# Patient Record
(demographics unavailable — no encounter records)

---

## 2019-03-13 NOTE — RAD REPORT
EXAM DESCRIPTION:  RAD - Chest Single View - 3/13/2019 6:15 pm

 

CLINICAL HISTORY:  Cough, abdominal pain, vomiting

 

COMPARISON:  None.

 

TECHNIQUE:  AP portable chest image was obtained 1726 hour .

 

FINDINGS:  No peripheral mass or consolidation. No significant failure or volume overload suspected. 
Stranding along the medial right lung base is suspected to be chronic atelectasis. Heart and vasculat
ure are normal. No measurable pleural effusion and no pneumothorax. Thoracolumbar scoliosis is presen
t with rods in place. No hardware fracture is seen. No acute aortic findings suspected.

 

IMPRESSION:  Lung parenchymal opacification medial right base is suspected be chronic atelectasis louie
ng the scoliotic curvature of the spine.

 

No failure or volume overload.

## 2019-03-13 NOTE — ER
Nurse's Notes                                                                                     

 [facility]                                                                

Name: Gita Shepherd                                                                                 

Age: 57 yrs                                                                                       

Sex: Female                                                                                       

: 1961                                                                                   

MRN: [id number]                                                                                   

Arrival Date: 2019                                                                          

Time: 16:16                                                                                       

Account#: [id number]                                                                            

Bed 23                                                                                            

Private MD: Arnulfo London                                                                          

Diagnosis: Cellulitis and acute lymphangitis of other parts of limb;Anemia,                       

  unspecified;Gastrointestinal hemorrhage, unspecified-guaiac trace positive;Hypokalemia          

                                                                                                  

Presentation:                                                                                     

                                                                                             

16:29 Presenting complaint: Patient states: I was sent here for evaluation from my PA's       sg  

      office, they think that I am very dehydrated, I have some sores that need attention         

      too, and sena also been vomiting for several days. Transition of care: patient was not       

      received from another setting of care. Onset of symptoms was 2019. Risk           

      Assessment: Do you want to hurt yourself or someone else? Patient reports no desire to      

      harm self or others. Initial Sepsis Screen: Does the patient meet any 2 criteria? No.       

      Patient's initial sepsis screen is negative. Does the patient have a suspected source       

      of infection? No. Patient's initial sepsis screen is negative. Care prior to arrival:       

      None.                                                                                       

16:29 Method Of Arrival: Ambulatory                                                             

16:29 Acuity: HANNAH 3                                                                           sg  

                                                                                                  

Historical:                                                                                       

- Allergies:                                                                                      

16:31 PENICILLINS;                                                                            sg  

- PMHx:                                                                                           

16:31 Hypertension;                                                                           sg  

                                                                                                  

- Immunization history:: Adult Immunizations up to date.                                          

- Social history:: Smoking status: Patient/guardian denies using tobacco.                         

- Ebola Screening: : Patient negative for fever greater than or equal to 101.5 degrees            

  Fahrenheit, and additional compatible Ebola Virus Disease symptoms Patient denies               

  exposure to infectious person Patient denies travel to an Ebola-affected area in the            

  21 days before illness onset No symptoms or risks identified at this time.                      

                                                                                                  

                                                                                                  

Screenin:30 Abuse screen: Denies threats or abuse. Denies injuries from another. Nutritional        ca1 

      screening: No deficits noted. Tuberculosis screening: No symptoms or risk factors           

      identified. Fall Risk None identified.                                                      

                                                                                                  

Assessment:                                                                                       

16:25 General: Appears in no apparent distress. comfortable, Behavior is calm, cooperative,   ca1 

      appropriate for age. Pain: Complains of pain in abdomen and right arm Pain currently is     

      8 out of 10 on a pain scale. Neuro: Level of Consciousness is awake, alert, obeys           

      commands, Oriented to person, place, time, situation, Appropriate for age.                  

      Cardiovascular: Heart tones S1 S2 present Capillary refill < 3 seconds Patient's skin       

      is warm and dry. Respiratory: Airway is patent Respiratory effort is even, unlabored,       

      Respiratory pattern is regular, symmetrical, Breath sounds are clear bilaterally. GI:       

      Abdomen is round non-distended, Bowel sounds present X 4 quads. Abd is soft X 4 quads       

      Abdomen is tender to palpation X 4 quads. Reports constipation, for 1 week. : No          

      deficits noted. No signs and/or symptoms were reported regarding the genitourinary          

      system. EENT: Oral mucosa is moist. Lesions noted. on inner R cheek. Derm: Skin is          

      healthy with good turgor, has lesions on Right arm that is ecchymotic and blistered in      

      the middle. Skin is pink, warm \T\ dry. Musculoskeletal: Circulation, motion, and           

      sensation intact. Capillary refill < 3 seconds.                                             

17:31 Reassessment: Patient appears in no apparent distress at this time. Patient and/or      ca1 

      family updated on plan of care and expected duration. Pain level reassessed. Patient is     

      alert, oriented x 3, equal unlabored respirations, skin warm/dry/pink.                      

18:11 Reassessment: Patient appears in no apparent distress at this time. Patient and/or      ca1 

      family updated on plan of care and expected duration. Pain level reassessed. Patient is     

      alert, oriented x 3, equal unlabored respirations, skin warm/dry/pink.                      

19:00 Reassessment: Patient appears in no apparent distress at this time. Patient and/or      ca1 

      family updated on plan of care and expected duration. Pain level reassessed. Patient is     

      alert, oriented x 3, equal unlabored respirations, skin warm/dry/pink. Awaiting room        

      assignment.                                                                                 

20:00 Reassessment: Patient appears in no apparent distress at this time. Patient and/or      ca1 

      family updated on plan of care and expected duration. Pain level reassessed. Patient is     

      alert, oriented x 3, equal unlabored respirations, skin warm/dry/pink.                      

20:30 Reassessment: called for report. 4th will call back when pt is assigned to a nurse.     ca1 

                                                                                                  

Vital Signs:                                                                                      

16:29  / 80; Pulse 82; Resp 16; Pulse Ox 99% on R/A; Pain 7/10;                         sg  

17:30 BP 90 / 61; Pulse 107; Resp 19; Pulse Ox 96% on R/A;                                    ca1 

18:11  / 62; Pulse 103; Resp 19; Pulse Ox 99% on R/A;                                   ca1 

18:47  / 69 Supine; Pulse 101;                                                          lt1 

18:47  / 61 Sitting; Pulse 108;                                                         lt1 

18:49  / 70 Standing; Pulse 116;                                                        lt1 

19:40  / 61; Pulse 94; Resp 19; Pulse Ox 100% on R/A;                                   ca1 

20:30  / 62; Pulse 96; Resp 19; Pulse Ox 100% on R/A;                                   ca1 

                                                                                                  

ED Course:                                                                                        

16:16 Patient arrived in ED.                                                                  as  

16:17 Arnulfo London MD is Private Physician.                                                  as  

16:24 Kishore Perdue MD is Attending Physician.                                             mono 

16:28 Delia Calderón, RN is Primary Nurse.                                                      ca1 

16:29 Arm band placed on.                                                                     sg  

16:30 Triage completed.                                                                       sg  

16:30 Patient has correct armband on for positive identification. Placed in gown. Bed in low  ca1 

      position. Call light in reach. Side rails up X 1. Cardiac monitor on. Pulse ox on. NIBP     

      on. Warm blanket given.                                                                     

16:50 Inserted saline lock: 20 gauge in left antecubital area, using aseptic technique. Blood ca1 

      collected.                                                                                  

17:04 First set of blood cultures drawn.                                                      ca1 

17:20 Second set of blood cultures drawn.                                                     ca1 

17:30 EKG done, by EKG tech. reviewed by Kishore Perdue MD.                                   sm3 

18:16 XRAY Chest (1 view) In Process Unspecified.                                             EDMS

18:21 Deysi Flores MD is Hospitalizing Provider.                                           mono 

20:31 IV discontinued, intact, bleeding controlled, No redness/swelling at site. Pressure     ca1 

      dressing applied.                                                                           

20:31 No provider procedures requiring assistance completed.                                  ca1 

                                                                                                  

Administered Medications:                                                                         

16:55 Drug: NS 0.9% 1000 ml Route: IV; Rate: 125 ml/hr; Site: left antecubital;               ca1 

19:06 Follow up: IV Status: Infusion continued upon admission                                 ca1 

20:33 Follow up: IV Status: Infusion continued upon admission                                 ca1 

17:55 Drug: Potassium Effervescent Tablet 25 mEq Route: PO;                                   ca1 

19:07 Follow up: Response: No adverse reaction                                                ca1 

17:55 Drug: fentaNYL (PF) 25 mcg Route: IVP; Site: left antecubital;                          ca1 

19:07 Follow up: Response: No adverse reaction; Pain is unchanged, physician notified         ca1 

18:00 Drug: Pepcid 20 mg Route: IVP; Site: left antecubital;                                  ca1 

19:07 Follow up: Response: No adverse reaction                                                ca1 

18:30 Drug: fentaNYL (PF) 25 mcg Route: IVP; Site: left antecubital;                          ca1 

19:42 Follow up: Response: No adverse reaction; Pain is decreased                             ca1 

18:35 Drug: NS 0.9% 500 ml Route: IV; Rate: bolus; Site: left antecubital;                    ca1 

19:06 Follow up: Response: No adverse reaction; IV Status: Infusion continued upon admission  ca1 

18:40 Drug: ProTONIX 40 mg Route: IVP; Site: left antecubital;                                ca1 

19:41 Follow up: Response: No adverse reaction                                                ca1 

18:42 Drug: Cefepime 1 grams Route: IVPB; Rate: 200 ml/hr; Infused Over: 30 mins; Site: left  ca1 

      antecubital;                                                                                

19:41 Follow up: Response: No adverse reaction; IV Status: Completed infusion                 ca1 

19:40 Drug: vancoMYCIN 1 grams Route: IVPB; Infused Over: 2 hrs; Site: left antecubital;      ca1 

20:33 Follow up: IV Status: Infusion continued upon admission                                 ca1 

                                                                                                  

                                                                                                  

Outcome:                                                                                          

18:22 Decision to Hospitalize by Provider.                                                    [facility] 

20:48 Admitted to Tele accompanied by tech, via wheelchair, room 420, with chart, Report      ca1 

      called to  Coty Avendano RN                                                                     

20:48 Condition: stable                                                                           

20:48 Instructed on the need for admit, Demonstrated understanding of instructions.               

21:13 Patient left the ED.                                                                    ca1 

                                                                                                  

Signatures:                                                                                       

Dispatcher [employer]                           EDMS                                                 

Ramesh Miller RN RN sg Anderson, Corey, MD MD cha Martinez, Amelia as Montes, Shakira                              sm3                                                  

Delia Calderón RN RN ca1 Tran, Leah                                   lt1                                                  

                                                                                                  

Corrections: (The following items were deleted from the chart)                                    

17:31 16:50 First set of blood cultures drawn ca1                                             ca1 

18:08 16:25 EENT: No deficits noted. No signs and/or symptoms were reported regarding the     ca1 

      EENT system. ca1                                                                            

18:50 18:47  / 70 Standing; Pulse 116bpm; lt1                                           lt1 

20:31 16:25 Derm: Skin is healthy with good turgor, has lesions on Right arm that is          ca1 

      ecchymotic and blistered in the middle. Skin is pink, warm \T\ dry. ca1                     

                                                                                                  

**************************************************************************************************

## 2019-03-13 NOTE — XMS REPORT
Clinical Summary

 Created on:2019



Patient:Gildardo Ryan

Sex:Female

:1961

External Reference #:[id number]





Demographics







 Address  [address] [place] [address]

 

 Home Phone  [phone]

 

 Mobile Phone  [phone]

 

 Home Phone  [phone]

 

 Home Phone  [phone]

 

 Email Address  [email or web address]

 

 Preferred Language  English

 

 Marital Status  

 

 Pentecostalism Affiliation  Unknown

 

 Race  White

 

 Ethnic Group  Not  or 









Author







 Organization  [facility]

 

 Address  [address] [place] [address]









Support







 Name  Relationship  Address  Phone

 

 Brett Ryan  Spouse  Unavailable  [phone]









Care Team Providers







 Name  Role  Phone

 

 Arnulfo London MD  Primary Care Provider  [phone]









Allergies







 Active Allergy  Reactions  Severity  Noted Date  Comments

 

 Penicillins  Rash  Low  2018  







Medications







 Medication  Sig  Dispensed  Refills  Start Date  End Date  Status

 

 metFORMIN  Take 1 mg by    0      Active



 (GLUCOPHAGE) 1,000  mouth 2 (two)          



 mg tablet  times a day.          

 

 simvastatin (ZOCOR)  Take 10 mg by    0      Active



 20 MG tablet  mouth nightly.          

 

 lurasidone (LATUDA)  Take 60 mg by    0      Active



 60 mg tablet  mouth daily.          

 

 desvenlafaxine  Take 100 mg by    0      Active



 (PRISTIQ) 100 MG 24  mouth daily.          



 hr tablet            

 

 topiramate (TOPAMAX)  Take 50 mg by    0      Active



 50 MG tablet  mouth 2 (two)          



   times a day.          

 

 metoprolol succinate  Take 100 mg by    0      Active



 XL (TOPROL-XL) 100  mouth daily.          



 mg 24 hr tablet            

 

 esomeprazole  Take 40 mg by    0      Active



 (NexIUM) 40 MG  mouth daily          



 capsule  before          



   breakfast.          

 

 ranitidine (ZANTAC)  Take 150 mg by    0      Active



 150 MG tablet  mouth nightly.          

 

 umeclidinium  Inhale 1 puff    0      Active



 brm/vilanterol tr  daily.          



 (ANORO ELLIPTA INHL)            

 

 polyethylene glycol  Take 17 g by    0      Active



 (MIRALAX) 17 gram  mouth daily.          



 packet            

 

 lisinopril  Take 1 tablet  30 tablet  0  2018    Active



 (PRINIVIL,ZESTRIL)  (20 mg total)          



 20 mg tablet  by mouth          



   nightly for 30          



   days.          

 

 amitriptyline  Take by mouth    0      Discontinued



 (ELAVIL) 75 MG  nightly.        8  



 tablet            

 

 levomefolate-algal  Take by mouth    0      Discontinued



 oil (DEPLIN, ALGAL  daily.        8  



 OIL,) 15-90.314 mg            



 capsule            

 

 furosemide (LASIX)  Take 20 mg by    0      Discontinued



 20 mg tablet  mouth daily.        8  

 

 lisinopril  Take 10 mg by    0      Discontinued



 (PRINIVIL,ZESTRIL)  mouth nightly.        8  



 10 mg tablet            

 

 sodium chloride 0.9%  by epidural    0      Discontinued



 parenteral solution  route        8  



 with morPHINE PF 1  continuously.          



 mg/mL solution,            



 bupivacaine (PF) 0.5            



 % (5 mg/mL) solution            



 0.0625 %, clonIDINE            



 (PF) 1,000 mcg/10 mL            



 (100 mcg/mL)            



 solution 1 mcg/mL            

 

 calcium carbonate  Chew 1 tablet  42 tablet  0  2018  



 (TUMS) 200 mg  (500 mg total)        8  



 calcium (500 mg)  3 (three)          



 chewable tablet  times a day          



   for 14 days.          

 

 docusate sodium  Take 1 capsule  60 capsule  0  2018  



 (COLACE) 100 MG  (100 mg total)        8  



 capsule  by mouth 2          



   (two) times a          



   day for 30          



   days.          

 

 gabapentin  Take 1 capsule  90 capsule  0  2018  



 (NEURONTIN) 100 mg  (100 mg total)        8  



 capsule  by mouth 3          



   (three) times          



   a day for 30          



   days.          

 

 pantoprazole  Take 1 tablet  60 tablet  0  2018  Discontinued



 (PROTONIX) 40 MG EC  (40 mg total)        8  



 tablet  by mouth 2          



   (two) times a          



   day for 30          



   days.          

 

 polyethylene glycol  Take 17 g by  30 packet  0  2018  
Discontinued



 (MIRALAX) 17 gram  mouth daily        8  



 packet  for 30 days.          

 

 senna (SENOKOT) 8.6  Take 2 tablets  60 tablet  0  2018  




 mg tablet  by mouth daily        8  



   with lunch for          



   30 days.          

 

 tiZANidine  Take 1 tablet  30 tablet  2  2018  



 (ZANAFLEX) 2 MG  (2 mg total)        8  



 tablet  by mouth every          



   6 (six) hours          



   as needed for          



   muscle spasms          



   for up to 30          



   days.          

 

 amLODIPine (NORVASC)  Take 1 tablet  30 tablet  0  2018  




 5 mg tablet  (5 mg total)        8  



   by mouth daily          



   for 30 days.          







Active Problems







 Problem  Noted Date

 

 Acute post-hemorrhagic anemia  2018

 

 S/P lumbar spinal fusion  2018

 

 Electrolyte and fluid disorder  2018

 

 Acute respiratory distress  2018

 

 Post-operative pain  2018

 

 Post-operative nausea and vomiting  2018

 

 Chronic pain  2018

 

 Obesity, Class III, BMI 40-49.9 (morbid obesity)  2018

 

 Acute blood loss anemia  2018

 

 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

 

 Scoliosis due to degenerative disease of spine in adult patient  2018







Encounters







 Date  Type  Specialty  Care Team  Description

 

 2019  Office Visit  Kassie Perez,  S/P lumbar spinal



       Perry BECERRIL MD  fusion (Primary Dx)

 

 2019  Hospital Encounter  Radiology  Ludy Perez MD  thoracolumbar spine,



         unspecified scoliosis



         type

 

 2019  Orders Only  Neurosurgery  Brush,  Lumbar radiculopathy, acute (
Primary Dx);



       Carmen, MA  Thoracic spine pain

 

 2019  Orders Only  Neurosurgery  Leufroy-Alea  Scoliosis of



       za, ,  thoracolumbar spine,



       MA  unspecified scoliosis



         type (Primary Dx)

 

 2018  Office Visit  JORDAN Wood MD  ENCOUNTER--DISREGARD



         (Primary Dx)

 

 2018  Orders Only  Neurosurgery  Leufroy-Alea  Scoliosis of



       za, ,  thoracolumbar spine,



       MA  unspecified scoliosis



         type (Primary Dx)

 

 2018  Office Visit  Kassie Perez,  Scoliosis due to



       Perry BECERRIL MD  degenerative disease



         of spine in adult



         patient (Primary Dx)

 

 2018  Hospital Encounter  Radiology  Ludy Perez MD  thoracolumbar spine,



         unspecified scoliosis



         type

 

 2018  Transcribe Orders  Neurosurgery  Leufroy-Alea  Scoliosis of



       za, ,  thoracolumbar spine,



       MA  unspecified scoliosis



         type (Primary Dx)

 

 2018 -  Hospital Encounter  Rehabilitation  Tastard,  Scoliosis due to



 2018      Devika LOVE MD  degenerative disease



         of spine in adult



         patient (Primary Dx)

 

 2018  Anesthesia Event  General Surgery  Kendal Ryan MD  

 

 2018  Surgery  General Surgery  Chris,  L2-L3, L4-L5 LATERAL



       Perry BECERRIL MD  INTERBODY FUSION W/



         INSTRUMENTATION

 

 07/10/2018  Anesthesia Event  General Surgery  Berry Barclay,  



       APRN  

 

 07/10/2018  Surgery  General Surgery  Chris,  L5-S1 ANTERIOR LUMBAR



       Perry BECERRIL MD  INTERBODY FUSION W/



         INSTRUMENTATION.

 

 07/10/2018 -  Hospital Encounter  Neurosurgery  Chris,  Other secondary 
scoliosis;



 2018      Perry BECERRIL MD  Scoliosis of lumbar spine, unspecified scoliosis 
type

 

 2018  Hospital Encounter  Radiology  Chris,  Preop testing



       Perry BECERRIL MD  

 

 2018  Pre-Admit Testing  Pre-Admission Testing  Chris  Preop testing (
Primary



   Appointment    Perry BECERRIL MD  Dx)

 

 2018  Pre-Admit Testing  Pre-Admission Testing  Chris  Preop testing (
Primary



   Appointment    Perry BECERRIL MD  Dx)

 

 2018  Hospital Encounter  Radiology  Chris  Age-related



       Perry BECERRIL MD  osteoporosis with



         current pathological



         fracture, initial



         encounter

 

 2018  Hospital Encounter  Radiology  Chris  Age-related



       Perry BECERRIL MD  osteoporosis with



         current pathological



         fracture, initial



         encounter

 

 2018  Office Visit  Neurosurgery  Chris,  Scoliosis due to



       Perry BECERRIL MD  degenerative disease



         of spine in adult



         patient (Primary Dx)

 

 2018  Transcribe Orders  Neurosurgery  ChitoAlea  Age-related



       za, ,  osteoporosis with



       MA  current pathological



         fracture, initial



         encounter (Primary Dx)



after 2018



Family History







 Medical History  Relation  Name  Comments

 

 Cancer  Paternal Grandfather    HIGH BLOOD PRESSURE/DIABETES/STROKE/THYROID



       TOOLITTLE









 Relation  Name  Status  Comments

 

 Paternal Grandfather    Other  







Social History







 Tobacco Use  Types  Packs/Day  Years Used  Date

 

 Former Smoker    2  25  Quit: 









 Smokeless Tobacco: Never Used      









 Comments: QUITE 5 YEARS









 Alcohol Use  Drinks/Week  oz/Week  Comments

 

 No      









 Sex Assigned at Birth  Date Recorded

 

 Not on file  









 Job Start Date  Occupation  Industry

 

 Not on file  Not on file  Not on file









 Travel History  Travel Start  Travel End









 No recent travel history available.







Last Filed Vital Signs







 Vital Sign  Reading  Time Taken

 

 Blood Pressure  118/65  2018 10:49 AM CDT

 

 Pulse  82  2018 10:49 AM CDT

 

 Temperature  37.1 C (98.7 F)  2018 10:49 AM CDT

 

 Respiratory Rate  18  2018 10:49 AM CDT

 

 Oxygen Saturation  95%  2018 10:49 AM CDT

 

 Inhaled Oxygen Concentration  -  -

 

 Weight  117 kg (259 lb)  07/10/2018  6:56 AM CDT

 

 Height  170.2 cm (5' 7")  07/10/2018  6:56 AM CDT

 

 Body Mass Index  40.57  07/10/2018  6:56 AM CDT







Plan of Treatment







 Date  Type  Specialty  Care Team  Description

 

 2019  Appointment  Radiology  Perry Perez MD



  



       [address]



  



       [address]



  



       [place], [place] [address] [phone] (Fax)  

 

 2019  Appointment  Radiology  Perry Perez MD



  



       [address]



  



       [address]



  



       [place], [place] [address] [phone] (Fax)  

 

 2019  Office Visit  Neurosurgery  Perry Perez MD



  



       [address]



  



       [address]



  



       [place], [place] [address] [phone] (Fax)  









 Health Maintenance  Due Date  Last Done  Comments

 

 CERVICAL CANCER SCREENING  1982    

 

 BREAST CANCER SCREENING  2011    

 

 COLON CANCER SCREENING  2011    

 

 SHINGLES VACCINES (#1)  2011    

 

 INFLUENZA VACCINE  Completed  2019  







Implants







 Implanted  Type  Area    Device  Shelf  Model /



         Identifier  Expiration  Serial / Lot



           Date  

 

 Bone Matriz Osteocel Pro Large - [id number] - [id number]  Human Tissue  N/A:  
NUVASIVE    2023  [id number] /



 Implanted: Qty: 1 on 07/10/2018 by Perry Perez MD  Implants  N/A        
[id number] /



             [id number]

 

 Paste Dbm Easy-Dispensing Wdmth Syr 10ml [address] - [id number] - [id number]
  Human Tissue  Posteri  MEDTRONIC    2020  [id number] /



 Implanted: Qty: 1 on 2018 by Perry Perez MD  Implants  or: N/A  
SPINAL GRAFT      [id number] /



       TECHNOLOGIES      [id number]

 

 Paste Dbm Easy-Dispensing Wdmth Syr 10ml [address] - [id number] - [id number]
  Human Tissue  Posteri  MEDTRONIC    2020  [id number] /



 Implanted: Qty: 1 on 2018 by Perry Perez MD  Implants  or: N/A  
SPINAL GRAFT      [id number] /



       TECHNOLOGIES      [id number]

 

 Bone Cancellous 30ml Chips - [id number] - [id number]  Human Tissue  Posteri  
RTI SURGICAL    2022  [id number] /



 Implanted: Qty: 1 on 2018 by Perry Perez MD  Implants  or: N/A  
INC.      [id number] /



             [id number]

 

 Bone Cancellous 30ml Chips - [id number] - [id number]  Human Tissue  Posteri  
RTI SURGICAL    2022  [id number] /



 Implanted: Qty: 1 on 2018 by Perry Perez MD  Implants  or: N/A  
INC.      [id number] /



             [id number]

 

 Bone Matriz Osteocel Pro Large - [id number] - [id number]  Human Tissue  N/A:  
NUVASIVE    2023  [id number] /



 Implanted: Qty: 1 on 2018 by Perry Perez MD  Implants  N/A        
[id number] /



             [id number]

 

 Kit Bone Grft Lmbr Tprd 8ml Xxl Infuse - [id number]  Human Tissue  Posteri  
MEDTRONIC    2019  [id number] /



 Implanted: Qty: 1 on 2018 by Perry Perez MD  Implants  or: N/A  
SPINAL AND       /



       BIOLOGICS      [id number]

 

 Kit Bone Grft Lmbr Tprd 8ml Xxl Infuse - [id number]  Human Tissue  Posteri  
MEDTRONIC    2019  [id number] /



 Implanted: Qty: 1 on 2018 by Perry Perez MD  Implants  or: N/A  
SPINAL AND       /



       BIOLOGICS      [id number]

 

 Maxcess 4 Surgical Access Kit - [id number]  IPM IMPLANT  N/A:  NUVASIVE SPINE 
     [id number] /



 Implanted: Qty: 1 on 07/10/2018 by Perry Perez MD  DEVICES  N/A         /

 

 Base Ti Imp, 8y36x83  10               - [id number]  IPM IMPLANT  N/A:  
NUVASIVE SPINE      [id number] /



 Implanted: Qty: 1 on 07/10/2018 by Perry Perez MD  DEVICES  N/A         /

 

 Base Ti Cantrall 6.0 X 17.5mm Variable - [id number]  IPM IMPLANT  N/A:  NUVASIVE 
SPINE      [id number] /



 Implanted: Qty: 1 on 07/10/2018 by Perry Perez MD  DEVICES  N/A         /

 

 Modulus Xlw, 03f10u35pm 15deg - [id number]  IPM IMPLANT  N/A:  NUVASIVE SPINE 
   2023  [id number] /



 Implanted: Qty: 1 on 2018 by Perry Perez MD  DEVICES  N/A         /



             [id number]

 

 Modulus Xlw, 47k72z54mc 15deg - [id number]  IPM IMPLANT  N/A:  NUVASIVE SPINE 
   2023  [id number] /



 Implanted: Qty: 1 on 2018 by Perry Perez MD  DEVICES  N/A         /



             [id number]

 

 Maxcess 4 Surgical Access Kit - [id number]  IPM IMPLANT  N/A:  NUVASIVE SPINE 
     [id number] /



 Implanted: 2018 (Quantity not on file)  DEVICES  N/A         /

 

 Unid Patient Specific Marty 5.5 - [id number]  IPM IMPLANT  N/A:  MEDICREA INT'L 
   10/13/2018  [id number] /



 Implanted: 2018 (Quantity not on file)  DEVICES  N/A         /



             [id number]

 

 Screw [id number] Bs Cnmas 7.5x80 T/C - [id number]  IPM IMPLANT  Posteri  
[employer]ON[employer]      [id number] /



 Implanted: Qty: 1 on 2018 by Perry Perez MD  DEVICES  or: N/A  
SOFAMOR DANEK       /

 

 Screw [id number] 5.5 Mas 7.5x100 Cc - [id number]  IPM IMPLANT  Posteri  
[employer]TRONIC      [id number] /



 Implanted: Qty: 1 on 2018 by Perry Perez MD  DEVICES  or: N/A  
SOFAMOR DANEK       /

 

 Screw [id number] 5.5 Mas 5.5x35 Cc - [id number]  IPM IMPLANT  Posteri  
[employer]TRONIC      [id number] /



 Implanted: Qty: 4 on 2018 by Perry Perez MD  DEVICES  or: N/A  
SOFAMOR DANEK       /

 

 Screw [id number] 5.5 Mas 5.5x40 Cc - [id number]  IPM IMPLANT  Posteri  
[employer]TRONIC      [id number] /



 Implanted: Qty: 3 on 2018 by Perry Perez MD  DEVICES  or: N/A  
SOFAMOR DANEK       /

 

 Screw [id number] 5.5 Mas 5.5x45 Cc - [id number]  IPM IMPLANT  Posteri  
[employer]TR[employer]IC      [id number] /



 Implanted: Qty: 12 on 2018 by Perry Perez MD  DEVICES  or: N/A  
SOFAMOR DANEK       /

 

 Screw [id number] 5.5 Mas 5.5x50 Cc - [id number]  IPM IMPLANT  Posteri  
[employer]TRONIC      [id number] /



 Implanted: Qty: 2 on 2018 by Perry Perez MD  DEVICES  or: N/A  
SOFAMOR DANEK       /

 

 Screw [id number] 5.5 Mas 6.5x50 Cc - [id number]  IPM IMPLANT  Posteri  
MEDTRONIC      [id number] /



 Implanted: Qty: 1 on 2018 by Perry Perez MD  DEVICES  or: N/A  
SOFAMOR DANEK       /

 

 Screw [id number] 5.5 Mas 6.5x45 Cc - [id number]  IPM IMPLANT  Posteri  
MEDTRONIC      [id number] /



 Implanted: Qty: 3 on 2018 by Perry Perez MD  DEVICES  or: N/A  
SOFAMOR DANEK       /

 

 Screw [id number] 5.5 Mas 7.5x40 Cc - [id number]  IPM IMPLANT  Posteri  
MEDTRONIC      [id number] /



 Implanted: Qty: 1 on 2018 by Perry Perez MD  DEVICES  or: N/A  
SOFAMOR DANEK       /

 

 Screw [id number] 5.5 Mas 7.5x35 Cc - [id number]  IPM IMPLANT  Posteri  
[employer]TRONIC      [id number] /



 Implanted: Qty: 1 on 2018 by Perry Perez MD  DEVICES  or: N/A  
SOFAMOR DANEK       /

 

 Screw [id number] 5.5 Mas 8.5x50 Cc - [id number]  IPM IMPLANT  Posteri  
[employer]TRONIC      [id number] /



 Implanted: Qty: 1 on 2018 by Perry Perez MD  DEVICES  or: N/A  
SOFAMOR DANEK       /

 

 Screw [id number] 5.5 Mas 8.5x40 Cc - [id number]  IPM IMPLANT  Posteri  
[employer]TRONIC      [id number] /



 Implanted: Qty: 1 on 2018 by Perry Perez MD  DEVICES  or: N/A  
SOFAMOR DANEK       /

 

 Screw [id number] 5.5 Mas 6.5x50 Cc - [id number]  IPM IMPLANT  Posteri  
[employer]TRONIC      [id number] /



 Implanted: Qty: 1 on 2018 by Perry Perez MD  DEVICES  or: N/A  
SOFAMOR DANEK       /

 

 Connector [id number] 5/6-6.0 Clsd Lat 20 - [id number]  IPM IMPLANT  Posteri  
[employer]TRONIC      [id number] /



 Implanted: Qty: 1 on 2018 by Perry Perez MD  DEVICES  or: N/A  
SOFAMOR DANEK       /

 

 Connector [id number] 5/6-6.0 Clsd Lat 25 - [id number]  IPM IMPLANT  Posteri  
MEDTRONIC      [id number] /



 Implanted: Qty: 1 on 2018 by Perry Perez MD  DEVICES  or: N/A  
SOFAMOR DANEK       /

 

 Material Bone Hmsts Wtrsolbl 2.5g Ostene - [id number]  Orthopedic  N/A:      10
/  [id number] /



 Implanted: Qty: 1 on 07/10/2018 by Perry Perez MD  Trauma  N/A         /



   Implants          [id number]

 

 Material Bone Hmsts Wtrsolbl 2.5g Ostene - [id number]  Orthopedic  N/A:      10
/  [id number] /



 Implanted: Qty: 1 on 07/10/2018 by Perry Perez MD  Trauma  N/A         /



   Implants          [id number]

 

 Material Bone Hmsts Wtrsolbl 2.5g Ostene - [id number]  Orthopedic  N/A:      10
/  [id number] /



 Implanted: Qty: 1 on 2018 by Perry Perez MD  Trauma  N/A         /



   Implants          [id number]

 

 Material Bone Hmsts Wtrsolbl 2.5g Ostene - [id number]  Orthopedic  N/A:      10
/  [id number] /



 Implanted: Qty: 1 on 2018 by Perry Perez MD  Trauma  N/A         /



   Implants          [id number]

 

 Screw Bone Canc Lag 4x30mm Ns - [id number]  Orthopedic  Posteri  [employer]TR[employer]    
  [id number] /



 Implanted: Qty: 32 on 2018 by Perry Perez MD  Trauma  or: N/A  
SOFAMOR DANEK       /



   Implants          

 

 Kit Dil M5 Xlif - [id number]  Spinal  N/A:  NUVASIVE      [id number] /



 Implanted: Qty: 1 on 07/10/2018 by Perry Perez MD  Implants  N/A         /

 

 Kit Dil M5 Xlif - [id number]  Spinal  N/A:  NUVASIVE      [id number] /



 Implanted: 2018 (Quantity not on file)  Implants  N/A         /

 

 Screw Set Std Ti 1/4in 32mm - [id number]  Spinal  Posteri  [employer]      
[id number] /



 Implanted: Qty: 2 on 2018 by Perry Perez MD  Implants  or: N/A  
SPINAL AND       /



       BIOLOGICS      







Procedures







 Procedure Name  Priority  Date/Time  Associated Diagnosis  Comments

 

 XR SPINE SCOLIOSIS 2-3  Routine  2019  2:09  Scoliosis of  Results for 
this



 VIEWS    PM CST  thoracolumbar spine,  procedure are in



       unspecified scoliosis  the results



       type  section.

 

 TRANSFUSE RED BLOOD  Routine  2018  5:55    



 CELLS    PM CDT    

 

 XR SPINE SCOLIOSIS 2-3  Routine  2018 12:14  Scoliosis of  Results for 
this



 VIEWS    PM CDT  thoracolumbar spine,  procedure are in



       unspecified scoliosis  the results



       type  section.

 

 POC GLUCOSE  Routine  2018  7:13    Results for this



     AM CDT    procedure are in



         the results



         section.

 

 POC GLUCOSE  Routine  2018  7:40    Results for this



     AM CDT    procedure are in



         the results



         section.

 

 POC GLUCOSE  Routine  2018  6:42    Results for this



     AM CDT    procedure are in



         the results



         section.

 

 POC GLUCOSE  Routine  2018  6:33    Results for this



     AM CDT    procedure are in



         the results



         section.

 

 POC GLUCOSE  Routine  2018  8:34    Results for this



     PM CDT    procedure are in



         the results



         section.

 

 POC GLUCOSE  Routine  2018  6:22    Results for this



     AM CDT    procedure are in



         the results



         section.

 

 ZZESTIMATED GFR  Routine  2018  4:00    Results for this



     AM CDT    procedure are in



         the results



         section.

 

 BASIC METABOLIC PANEL  Routine  2018  4:00    Results for this



     AM CDT    procedure are in



         the results



         section.

 

 POC GLUCOSE  Routine  2018  6:46    Results for this



     AM CDT    procedure are in



         the results



         section.

 

 POC GLUCOSE  Routine  2018  5:50    Results for this



     PM CDT    procedure are in



         the results



         section.

 

 POC GLUCOSE  Routine  2018  6:28    Results for this



     AM CDT    procedure are in



         the results



         section.

 

 SMEAR REVIEW  Routine  2018  5:00    Results for this



     AM CDT    procedure are in



         the results



         section.

 

 ZZESTIMATED GFR  Routine  2018  5:00    Results for this



     AM CDT    procedure are in



         the results



         section.

 

 THYROID STIMULATING  Routine  2018  5:00    Results for this



 HORMONE    AM CDT    procedure are in



         the results



         section.

 

 HC COMPLETE BLD COUNT  Routine  2018  5:00    Results for this



 W/AUTO DIFF    AM CDT    procedure are in



         the results



         section.

 

 BASIC METABOLIC PANEL  Routine  2018  5:00    Results for this



     AM CDT    procedure are in



         the results



         section.

 

 URINALYSIS SCREEN AND  Routine  2018  9:16    Results for this



 MICROSCOPY, WITH    PM CDT    procedure are in



 REFLEX TO CULTURE        the results



         section.

 

 URINE CULTURE  Routine  2018  9:16    Results for this



     PM CDT    procedure are in



         the results



         section.

 

 POC GLUCOSE  Routine  2018  9:10    Results for this



     PM CDT    procedure are in



         the results



         section.

 

 NM LUNG VENTILATION  STAT  2018  8:29    Results for this



 PERFUSION    PM CDT    procedure are in



         the results



         section.

 

 XR CHEST 1 VW PORTABLE  STAT  2018  5:11    Results for this



     PM CDT    procedure are in



         the results



         section.

 

 POC GLUCOSE  Routine  2018  4:59    Results for this



     PM CDT    procedure are in



         the results



         section.

 

 ZZESTIMATED GFR  Routine  2018 12:55    Results for this



     PM CDT    procedure are in



         the results



         section.

 

 COMPREHENSIVE  Routine  2018 12:55    Results for this



 METABOLIC PANEL    PM CDT    procedure are in



         the results



         section.

 

 HC COMPLETE BLD COUNT  Routine  2018 12:55    Results for this



 W/AUTO DIFF    PM CDT    procedure are in



         the results



         section.

 

 POC GLUCOSE  Routine  2018 11:07    Results for this



     AM CDT    procedure are in



         the results



         section.

 

 POC GLUCOSE  Routine  2018  6:43    Results for this



     AM CDT    procedure are in



         the results



         section.

 

 POC GLUCOSE  Routine  2018  9:08    Results for this



     PM CDT    procedure are in



         the results



         section.

 

 POC GLUCOSE  Routine  2018  5:00    Results for this



     PM CDT    procedure are in



         the results



         section.

 

 POC GLUCOSE  Routine  2018 11:16    Results for this



     AM CDT    procedure are in



         the results



         section.

 

 POC GLUCOSE  Routine  2018  6:36    Results for this



     AM CDT    procedure are in



         the results



         section.

 

 POC GLUCOSE  Routine  2018  8:43    Results for this



     PM CDT    procedure are in



         the results



         section.

 

 POC GLUCOSE  Routine  2018  4:55    Results for this



     PM CDT    procedure are in



         the results



         section.

 

 POC GLUCOSE  Routine  2018 11:07    Results for this



     AM CDT    procedure are in



         the results



         section.

 

 HEMOGLOBIN A1C  Routine  2018  7:02    Results for this



     AM CDT    procedure are in



         the results



         section.

 

 HC COMPLETE BLD COUNT  Routine  2018  7:02    Results for this



 W/AUTO DIFF    AM CDT    procedure are in



         the results



         section.

 

 POC GLUCOSE  Routine  2018  6:23    Results for this



     AM CDT    procedure are in



         the results



         section.

 

 POC GLUCOSE  Routine  2018  8:59    Results for this



     PM CDT    procedure are in



         the results



         section.

 

 URINALYSIS, AUTOMATED  Routine  2018  8:30    Results for this



 WITH MICROSCOPY    PM CDT    procedure are in



         the results



         section.

 

 ZZESTIMATED GFR  Routine  2018  7:45    Results for this



     PM CDT    procedure are in



         the results



         section.

 

 PREALBUMIN LEVEL  Routine  2018  7:45    Results for this



     PM CDT    procedure are in



         the results



         section.

 

 PHOSPHORUS LEVEL  Routine  2018  7:45    Results for this



     PM CDT    procedure are in



         the results



         section.

 

 MAGNESIUM LEVEL  Routine  2018  7:45    Results for this



     PM CDT    procedure are in



         the results



         section.

 

 COMPREHENSIVE  Routine  2018  7:45    Results for this



 METABOLIC PANEL    PM CDT    procedure are in



         the results



         section.

 

 POC GLUCOSE  Routine  2018  4:45    Results for this



     PM CDT    procedure are in



         the results



         section.

 

 POC GLUCOSE  Routine  2018 11:30    Results for this



     AM CDT    procedure are in



         the results



         section.

 

 POTASSIUM LEVEL  Routine  2018  8:22    Results for this



     AM CDT    procedure are in



         the results



         section.

 

 POC GLUCOSE  Routine  2018  7:24    Results for this



     AM CDT    procedure are in



         the results



         section.

 

 POC GLUCOSE  Routine  2018  9:43    Results for this



     PM CDT    procedure are in



         the results



         section.

 

 POC GLUCOSE  Routine  2018  5:21    Results for this



     PM CDT    procedure are in



         the results



         section.

 

 POC GLUCOSE  Routine  2018 11:29    Results for this



     AM CDT    procedure are in



         the results



         section.

 

 ZZESTIMATED GFR  Routine  2018  3:35    Results for this



     AM CDT    procedure are in



         the results



         section.

 

 HC COMPLETE BLD COUNT  Routine  2018  3:35    Results for this



 W/AUTO DIFF    AM CDT    procedure are in



         the results



         section.

 

 MAGNESIUM LEVEL  Routine  2018  3:35    Results for this



     AM CDT    procedure are in



         the results



         section.

 

 BASIC METABOLIC PANEL  Routine  2018  3:35    Results for this



     AM CDT    procedure are in



         the results



         section.

 

 POC GLUCOSE  Routine  2018  8:10    Results for this



     PM CDT    procedure are in



         the results



         section.

 

 POC GLUCOSE  Routine  2018  3:55    Results for this



     PM CDT    procedure are in



         the results



         section.

 

 POC GLUCOSE  Routine  2018 11:24    Results for this



     AM CDT    procedure are in



         the results



         section.

 

 POC GLUCOSE  Routine  2018  7:54    Results for this



     AM CDT    procedure are in



         the results



         section.

 

 HC COMPLETE BLD COUNT  Routine  2018  6:35    Results for this



 W/AUTO DIFF    AM CDT    procedure are in



         the results



         section.

 

 ZZESTIMATED GFR  Routine  2018  4:00    Results for this



     AM CDT    procedure are in



         the results



         section.

 

 BASIC METABOLIC PANEL  Routine  2018  4:00    Results for this



     AM CDT    procedure are in



         the results



         section.

 

 POC GLUCOSE  Routine  2018 10:20    Results for this



     PM CDT    procedure are in



         the results



         section.

 

 POC GLUCOSE  Routine  2018  4:34    Results for this



     PM CDT    procedure are in



         the results



         section.

 

 POC GLUCOSE  Routine  2018 12:06    Results for this



     PM CDT    procedure are in



         the results



         section.

 

 POC GLUCOSE  Routine  2018  8:13    Results for this



     AM CDT    procedure are in



         the results



         section.

 

 ZZESTIMATED GFR  Routine  2018  4:00    Results for this



     AM CDT    procedure are in



         the results



         section.

 

 BASIC METABOLIC PANEL  Routine  2018  4:00    Results for this



     AM CDT    procedure are in



         the results



         section.

 

 POC GLUCOSE  Routine  2018 10:01    Results for this



     PM CDT    procedure are in



         the results



         section.

 

 POC GLUCOSE  Routine  2018  4:36    Results for this



     PM CDT    procedure are in



         the results



         section.

 

 ZZESTIMATED GFR  Timed  2018  2:35    Results for this



     PM CDT    procedure are in



         the results



         section.

 

 BASIC METABOLIC PANEL  Timed  2018  2:35    Results for this



     PM CDT    procedure are in



         the results



         section.

 

 POC GLUCOSE  Routine  2018 12:05    Results for this



     PM CDT    procedure are in



         the results



         section.

 

 POC GLUCOSE  Routine  2018  7:59    Results for this



     AM CDT    procedure are in



         the results



         section.

 

 CBC HEMOGRAM  Routine  2018  3:25    Results for this



     AM CDT    procedure are in



         the results



         section.

 

 ZZESTIMATED GFR  Routine  2018  3:25    Results for this



     AM CDT    procedure are in



         the results



         section.

 

 BASIC METABOLIC PANEL  Routine  2018  3:25    Results for this



     AM CDT    procedure are in



         the results



         section.

 

 POC GLUCOSE  Routine  2018  9:05    Results for this



     PM CDT    procedure are in



         the results



         section.

 

 POC GLUCOSE  Routine  2018  4:08    Results for this



     PM CDT    procedure are in



         the results



         section.

 

 POC GLUCOSE  Routine  2018 11:40    Results for this



     AM CDT    procedure are in



         the results



         section.

 

 VANCOMYCIN LEVEL,  Timed  2018  9:26    Results for this



 TROUGH    AM CDT    procedure are in



         the results



         section.

 

 POC GLUCOSE  Routine  2018  8:20    Results for this



     AM CDT    procedure are in



         the results



         section.

 

 POC GLUCOSE  Routine  2018  4:38    Results for this



     AM CDT    procedure are in



         the results



         section.

 

 ZZESTIMATED GFR  Routine  2018  4:00    Results for this



     AM CDT    procedure are in



         the results



         section.

 

 BASIC METABOLIC PANEL  Routine  2018  4:00    Results for this



     AM CDT    procedure are in



         the results



         section.

 

 POC GLUCOSE  Routine  2018 11:50    Results for this



     PM CDT    procedure are in



         the results



         section.

 

 POC GLUCOSE  Routine  2018  9:09    Results for this



     PM CDT    procedure are in



         the results



         section.

 

 POC GLUCOSE  Routine  2018  5:04    Results for this



     PM CDT    procedure are in



         the results



         section.

 

 ECG 12-LEAD  STAT  2018  3:34    Results for this



     PM CDT    procedure are in



         the results



         section.

 

 TROPONIN  STAT  2018  3:04    Results for this



     PM CDT    procedure are in



         the results



         section.

 

 ZZESTIMATED GFR  Routine  2018  3:04    Results for this



     PM CDT    procedure are in



         the results



         section.

 

 BASIC METABOLIC PANEL  Routine  2018  3:04    Results for this



     PM CDT    procedure are in



         the results



         section.

 

 HC COMPLETE BLD COUNT  Routine  2018  3:04    Results for this



 W/AUTO DIFF    PM CDT    procedure are in



         the results



         section.

 

 POC GLUCOSE  Routine  2018 11:55    Results for this



     AM CDT    procedure are in



         the results



         section.

 

 POC GLUCOSE  Routine  2018  8:23    Results for this



     AM CDT    procedure are in



         the results



         section.

 

 POC GLUCOSE  Routine  2018  5:43    Results for this



     AM CDT    procedure are in



         the results



         section.

 

 POC GLUCOSE  Routine  2018 10:45    Results for this



     PM CDT    procedure are in



         the results



         section.

 

 POC GLUCOSE  Routine  2018  8:15    Results for this



     PM CDT    procedure are in



         the results



         section.

 

 VANCOMYCIN LEVEL,  Routine  2018  5:07    Results for this



 RANDOM    PM CDT    procedure are in



         the results



         section.

 

 POC GLUCOSE  Routine  2018  5:02    Results for this



     PM CDT    procedure are in



         the results



         section.

 

 POC GLUCOSE  Routine  2018 11:51    Results for this



     AM CDT    procedure are in



         the results



         section.

 

 POTASSIUM LEVEL  Routine  2018 11:26    Results for this



     AM CDT    procedure are in



         the results



         section.

 

 XR CHEST 1 VW PORTABLE  Timed  2018 10:14    Results for this



     AM CDT    procedure are in



         the results



         section.

 

 POC GLUCOSE  Routine  2018  7:58    Results for this



     AM CDT    procedure are in



         the results



         section.

 

 ZZESTIMATED GFR  Routine  2018  5:12    Results for this



     AM CDT    procedure are in



         the results



         section.

 

 PHOSPHORUS LEVEL  Routine  2018  5:12    Results for this



     AM CDT    procedure are in



         the results



         section.

 

 MAGNESIUM LEVEL  Routine  2018  5:12    Results for this



     AM CDT    procedure are in



         the results



         section.

 

 IONIZED CALCIUM  Routine  2018  5:12    Results for this



     AM CDT    procedure are in



         the results



         section.

 

 CBC HEMOGRAM  Routine  2018  5:12    Results for this



     AM CDT    procedure are in



         the results



         section.

 

 BASIC METABOLIC PANEL  Routine  2018  5:12    Results for this



     AM CDT    procedure are in



         the results



         section.

 

 POC GLUCOSE  Routine  2018 12:34    Results for this



     AM CDT    procedure are in



         the results



         section.

 

 POC GLUCOSE  Routine  2018  8:53    Results for this



     PM CDT    procedure are in



         the results



         section.

 

 POC GLUCOSE  Routine  2018  4:00    Results for this



     PM CDT    procedure are in



         the results



         section.

 

 POC GLUCOSE  Routine  2018 11:26    Results for this



     AM CDT    procedure are in



         the results



         section.

 

 XR PICC CHEST PORTABLE  Routine  2018 11:18  Scoliosis of lumbar  
Results for this



     AM CDT  spine, unspecified  procedure are in



       scoliosis type  the results



         section.

 

 HC CATH DUAL LUMEN  Routine  2018 10:34    Results for this



 PICC    AM CDT    procedure are in



         the results



         section.

 

 HC US GUIDED VASCULAR  Routine  2018 10:34    Results for this



 ACCESS    AM CDT    procedure are in



         the results



         section.

 

 HC CVL PICC INSERT 5  Routine  2018 10:34    Results for this



 YRS OR > W/O IMG GUID    AM CDT    procedure are in



         the results



         section.

 

 INTRAOPERATIVE  Routine  2018  7:24    



 MONITORING    AM CDT    

 

 POC GLUCOSE  Routine  2018  7:11    Results for this



     AM CDT    procedure are in



         the results



         section.

 

 PREPARE RBC  Timed  2018  6:40    



     AM CDT    

 

 PREPARE RBC  Timed  2018  6:40    Results for this



     AM CDT    procedure are in



         the results



         section.

 

 TYPE AND SCREEN  Timed  2018  6:40    Results for this



     AM CDT    procedure are in



         the results



         section.

 

 ZZESTIMATED GFR  Routine  2018  4:02    Results for this



     AM CDT    procedure are in



         the results



         section.

 

 MAGNESIUM LEVEL  Routine  2018  4:02    Results for this



     AM CDT    procedure are in



         the results



         section.

 

 PHOSPHORUS LEVEL  Routine  2018  4:02    Results for this



     AM CDT    procedure are in



         the results



         section.

 

 COMPREHENSIVE  Routine  2018  4:02    Results for this



 METABOLIC PANEL    AM CDT    procedure are in



         the results



         section.

 

 HC COMPLETE BLD COUNT  Routine  2018  3:50    Results for this



 W/AUTO DIFF    AM CDT    procedure are in



         the results



         section.

 

 POC GLUCOSE  Routine  2018  3:37    Results for this



     AM CDT    procedure are in



         the results



         section.

 

 POC GLUCOSE  Routine  07/15/2018 11:44    Results for this



     PM CDT    procedure are in



         the results



         section.

 

 ZZESTIMATED GFR  Timed  07/15/2018  8:00    Results for this



     PM CDT    procedure are in



         the results



         section.

 

 BASIC METABOLIC PANEL  Timed  07/15/2018  8:00    Results for this



     PM CDT    procedure are in



         the results



         section.

 

 POC GLUCOSE  Routine  07/15/2018  7:32    Results for this



     PM CDT    procedure are in



         the results



         section.

 

 US HEPATIC  Routine  07/15/2018  4:00    Results for this



     PM CDT    procedure are in



         the results



         section.

 

 POC GLUCOSE  Routine  07/15/2018  3:25    Results for this



     PM CDT    procedure are in



         the results



         section.

 

 ECG 12-LEAD  Routine  07/15/2018  2:40    Results for this



     PM CDT    procedure are in



         the results



         section.

 

 XR ABDOMEN 1 VW  STAT  07/15/2018 12:38    Results for this



 PORTABLE    PM CDT    procedure are in



         the results



         section.

 

 POC GLUCOSE  Routine  07/15/2018 11:30    Results for this



     AM CDT    procedure are in



         the results



         section.

 

 ZZESTIMATED GFR  Routine  07/15/2018 10:20    Results for this



     AM CDT    procedure are in



         the results



         section.

 

 ARTERIAL BLOOD GAS  Routine  07/15/2018 10:20    Results for this



     AM CDT    procedure are in



         the results



         section.

 

 BASIC METABOLIC PANEL  Routine  07/15/2018 10:20    Results for this



     AM CDT    procedure are in



         the results



         section.

 

 US DUPLEX VENOUS LOWER  STAT  07/15/2018  8:28    Results for this



 EXTREMITY BILATERAL    AM CDT    procedure are in



         the results



         section.

 

 POC GLUCOSE  Routine  07/15/2018  7:00    Results for this



     AM CDT    procedure are in



         the results



         section.

 

 ARTERIAL BLOOD GAS  Routine  07/15/2018  6:37    Results for this



     AM CDT    procedure are in



         the results



         section.

 

 XR CHEST 1 VW PORTABLE  STAT  07/15/2018  5:08    Results for this



     AM CDT    procedure are in



         the results



         section.

 

 ARTERIAL BLOOD GAS  STAT  07/15/2018  5:00    Results for this



     AM CDT    procedure are in



         the results



         section.

 

 CT ANGIOGRAM PE CHEST  STAT  07/15/2018  4:52    Results for this



     AM CDT    procedure are in



         the results



         section.

 

 POC GLUCOSE  Routine  07/15/2018  3:52    Results for this



     AM CDT    procedure are in



         the results



         section.

 

 POTASSIUM LEVEL  STAT  07/15/2018  3:35    Results for this



     AM CDT    procedure are in



         the results



         section.

 

 PHOSPHORUS LEVEL  STAT  07/15/2018  3:35    Results for this



     AM CDT    procedure are in



         the results



         section.

 

 BLOOD CULTURE, AEROBIC  Routine  07/15/2018  2:00    Results for this



 & ANAEROBIC    AM CDT    procedure are in



         the results



         section.

 

 BLOOD CULTURE, AEROBIC  Routine  07/15/2018  1:00    Results for this



 & ANAEROBIC    AM CDT    procedure are in



         the results



         section.

 

 MANUAL DIFFERENTIAL  Routine  07/15/2018 12:10    Results for this



     AM CDT    procedure are in



         the results



         section.

 

 CBC WITH PLATELET AND  Routine  07/15/2018 12:10    Results for this



 DIFFERENTIAL    AM CDT    procedure are in



         the results



         section.

 

 ZZESTIMATED GFR  Routine  07/15/2018 12:03    Results for this



     AM CDT    procedure are in



         the results



         section.

 

 IONIZED CALCIUM  Routine  07/15/2018 12:03    Results for this



     AM CDT    procedure are in



         the results



         section.

 

 PHOSPHORUS LEVEL  Routine  07/15/2018 12:03    Results for this



     AM CDT    procedure are in



         the results



         section.

 

 MAGNESIUM LEVEL  Routine  07/15/2018 12:03    Results for this



     AM CDT    procedure are in



         the results



         section.

 

 COMPREHENSIVE  Routine  07/15/2018 12:03    Results for this



 METABOLIC PANEL    AM CDT    procedure are in



         the results



         section.

 

 ARTERIAL BLOOD GAS  STAT  07/15/2018 12:00    Results for this



     AM CDT    procedure are in



         the results



         section.

 

 POC GLUCOSE  Routine  2018 11:59    Results for this



     PM CDT    procedure are in



         the results



         section.

 

 SMEAR REVIEW  STAT  2018  9:45    Results for this



     PM CDT    procedure are in



         the results



         section.

 

 HC COMPLETE BLD COUNT  STAT  2018  9:45    Results for this



 W/AUTO DIFF    PM CDT    procedure are in



         the results



         section.

 

 TRANSFUSE RED BLOOD  Routine  2018  9:32    



 CELLS    PM CDT    

 

 POC GLUCOSE  Routine  2018  8:06    Results for this



     PM CDT    procedure are in



         the results



         section.

 

 TRANSFUSE RED BLOOD  Routine  2018  6:45    



 CELLS    PM CDT    

 

 HEMOGLOBIN &  Routine  2018  4:00    Results for this



 HEMATOCRIT    PM CDT    procedure are in



         the results



         section.

 

 POC GLUCOSE  Routine  2018  3:39    Results for this



     PM CDT    procedure are in



         the results



         section.

 

 POC GLUCOSE  Routine  2018 11:23    Results for this



     AM CDT    procedure are in



         the results



         section.

 

 XR CHEST 1 VW PORTABLE  Routine  2018  9:03    Results for this



     AM CDT    procedure are in



         the results



         section.

 

 POC GLUCOSE  Routine  2018  8:18    Results for this



     AM CDT    procedure are in



         the results



         section.

 

 POC GLUCOSE  Routine  2018  5:21    Results for this



     AM CDT    procedure are in



         the results



         section.

 

 SMEAR REVIEW  Routine  2018  3:15    Results for this



     AM CDT    procedure are in



         the results



         section.

 

 HC COMPLETE BLD COUNT  Routine  2018  3:15    Results for this



 W/AUTO DIFF    AM CDT    procedure are in



         the results



         section.

 

 POC GLUCOSE  Routine  2018 12:00    Results for this



     AM CDT    procedure are in



         the results



         section.

 

 ZZESTIMATED GFR  Routine  2018 12:00    Results for this



     AM CDT    procedure are in



         the results



         section.

 

 BASIC METABOLIC PANEL  Routine  2018 12:00    Results for this



     AM CDT    procedure are in



         the results



         section.

 

 ECG 12-LEAD  STAT  2018 11:15    Results for this



     PM CDT    procedure are in



         the results



         section.

 

 SMEAR REVIEW  STAT  2018 11:00    Results for this



     PM CDT    procedure are in



         the results



         section.

 

 HC COMPLETE BLD COUNT  STAT  2018 11:00    Results for this



 W/AUTO DIFF    PM CDT    procedure are in



         the results



         section.

 

 ARTERIAL BLOOD GAS  STAT  2018 10:35    Results for this



     PM CDT    procedure are in



         the results



         section.

 

 ZZESTIMATED GFR  STAT  2018 10:30    Results for this



     PM CDT    procedure are in



         the results



         section.

 

 PHOSPHORUS LEVEL  STAT  2018 10:30    Results for this



     PM CDT    procedure are in



         the results



         section.

 

 MAGNESIUM LEVEL  STAT  2018 10:30    Results for this



     PM CDT    procedure are in



         the results



         section.

 

 BASIC METABOLIC PANEL  STAT  2018 10:30    Results for this



     PM CDT    procedure are in



         the results



         section.

 

 POC GLUCOSE  Routine  2018 10:09    Results for this



     PM CDT    procedure are in



         the results



         section.

 

 CT HEAD WO CONTRAST  STAT  2018  9:08    Results for this



     PM CDT    procedure are in



         the results



         section.

 

 ARTERIAL BLOOD GAS  Routine  2018  7:59    Results for this



     PM CDT    procedure are in



         the results



         section.

 

 SMEAR REVIEW  Routine  2018  6:45    Results for this



     PM CDT    procedure are in



         the results



         section.

 

 ZZESTIMATED GFR  Routine  2018  6:45    Results for this



     PM CDT    procedure are in



         the results



         section.

 

 BASIC METABOLIC PANEL  Routine  2018  6:45    Results for this



     PM CDT    procedure are in



         the results



         section.

 

 HC COMPLETE BLD COUNT  Routine  2018  6:45    Results for this



 W/AUTO DIFF    PM CDT    procedure are in



         the results



         section.

 

 MAGNESIUM LEVEL  STAT  2018  5:05    Results for this



     PM CDT    procedure are in



         the results



         section.

 

 LACTIC ACID, SYRINGE  STAT  2018  5:05    Results for this



     PM CDT    procedure are in



         the results



         section.

 

 IONIZED CALCIUM,  STAT  2018  5:05    Results for this



 ARTERIAL    PM CDT    procedure are in



         the results



         section.

 

 GLUCOSE LEVEL, SYRINGE  STAT  2018  5:05    Results for this



     PM CDT    procedure are in



         the results



         section.

 

 POTASSIUM, SYRINGE  STAT  2018  5:05    Results for this



     PM CDT    procedure are in



         the results



         section.

 

 HEMOGLOBIN, SYRINGE  STAT  2018  5:05    Results for this



     PM CDT    procedure are in



         the results



         section.

 

 SODIUM LEVEL, SYRINGE  STAT  2018  5:05    Results for this



     PM CDT    procedure are in



         the results



         section.

 

 ARTERIAL BLOOD GAS,  STAT  2018  5:05    Results for this



 CORRECTED    PM CDT    procedure are in



         the results



         section.

 

 OR FL > 1 HOUR  Routine  2018  4:41    Results for this



     PM CDT    procedure are in



         the results



         section.

 

 TRANSFUSE RED BLOOD  Routine  2018  4:34    



 CELLS    PM CDT    

 

 XR LUMBAR SPINE 1 VW  Routine  2018  4:31    Results for this



     PM CDT    procedure are in



         the results



         section.

 

 XR CERVICAL SPINE 1 VW  Routine  2018  4:31    Results for this



     PM CDT    procedure are in



         the results



         section.

 

 TRANSFUSE RED BLOOD  Routine  2018  4:10    



 CELLS    PM CDT    

 

 LACTIC ACID, SYRINGE  STAT  2018  3:50    Results for this



     PM CDT    procedure are in



         the results



         section.

 

 MAGNESIUM LEVEL  STAT  2018  3:50    Results for this



     PM CDT    procedure are in



         the results



         section.

 

 GLUCOSE LEVEL, SYRINGE  STAT  2018  3:50    Results for this



     PM CDT    procedure are in



         the results



         section.

 

 HEMOGLOBIN, SYRINGE  STAT  2018  3:50    Results for this



     PM CDT    procedure are in



         the results



         section.

 

 IONIZED CALCIUM,  STAT  2018  3:50    Results for this



 ARTERIAL    PM CDT    procedure are in



         the results



         section.

 

 SODIUM LEVEL, SYRINGE  STAT  2018  3:50    Results for this



     PM CDT    procedure are in



         the results



         section.

 

 POTASSIUM, SYRINGE  STAT  2018  3:50    Results for this



     PM CDT    procedure are in



         the results



         section.

 

 ARTERIAL BLOOD GAS,  STAT  2018  3:50    Results for this



 CORRECTED    PM CDT    procedure are in



         the results



         section.

 

 GLUCOSE LEVEL, SYRINGE  STAT  2018  2:11    Results for this



     PM CDT    procedure are in



         the results



         section.

 

 IONIZED CALCIUM,  STAT  2018  2:11    Results for this



 ARTERIAL    PM CDT    procedure are in



         the results



         section.

 

 HEMOGLOBIN, SYRINGE  STAT  2018  2:11    Results for this



     PM CDT    procedure are in



         the results



         section.

 

 POTASSIUM, SYRINGE  STAT  2018  2:11    Results for this



     PM CDT    procedure are in



         the results



         section.

 

 SODIUM LEVEL, SYRINGE  STAT  2018  2:11    Results for this



     PM CDT    procedure are in



         the results



         section.

 

 ARTERIAL BLOOD GAS,  STAT  2018  2:11    Results for this



 CORRECTED    PM CDT    procedure are in



         the results



         section.

 

 OR FL > 1 HOUR  Routine  2018 10:21    Results for this



     AM CDT    procedure are in



         the results



         section.

 

 ARTERIAL LINE  Routine  2018  9:17    



     AM CDT    









   Procedure Note - Kendal Ryan MD - 2018  9:17 AM CDT



Arterial line



   Performed by: KENDAL RYAN



   Authorized by: KENDAL RYAN



   



   Patient Location:  OR



   Start Time:  2018 7:52 AM



   End Time:  2018 7:54 AM



   Staff:



     Anesthesiologist:  KENDAL RYAN



     Performed by:  Anesthesiologist



   Pre-procedure: patient identified, IV checked, site and side verified, risks 
and benefits discussed, procedure verified, surgical consent complete, patient 
position confirmed, monitors and equipment checked and pre-op evaluation 
complete



   MSBT: antiseptic used, all elements of maximal sterile barrier technique 
followed, hand hygiene performed, cap/gown used by other personnel and 
solutions labeled



   TIme Out Performed:  2018 7:52 AM



   Indications:



     Indications: multiple ABGs and hemodynamic monitoring



   Anesthesia:



     Anesthesia:  General



   Procedure Details:



     Arterial Line placement:  Placed post induction



     Line placement site:  Radial



   Line placement side:  Right



     Arterial line gauge:  20 G



   Number of attempts:  1



   Ultrasound guidance used: No



   Post-procedure:



     Post-procedure:  Sterile dressing applied



     Post procedure circulation, sensation, movement:  Unchanged



     Patient tolerance:  Patient tolerated the procedure well with no immediate 
complications









 ZZESTIMATED GFR  STAT  2018  9:05 AM CDT    Results for this



         procedure are in the



         results section.

 

 BASIC METABOLIC PANEL  STAT  2018  9:05 AM CDT    Results for this



         procedure are in the



         results section.

 

 HC COMPLETE BLD COUNT  STAT  2018  9:05 AM CDT    Results for this



 W/AUTO DIFF        procedure are in the



         results section.

 

 NC AN ELECTIVE ENDOTRACHEAL  Routine  2018  8:10 AM CDT    



 AIRWAY        









   Procedure Note - Sulema Cazares, CRNA - 2018  8:10 AM CDT



Airway



   Date/Time: 2018 7:51 AM



   Performed by: SULEMA CAZARES



   Authorized by: KENDAL RYAN



   



   Location:  OR



   Urgency:  Elective



   Difficult Airway: No



   Preoxygenated with 100% O2: Yes



   C-spine Precautions Maintained Throughout: Yes



   Mask Ventilation:  Easy mask



   Final Airway Type:  Endotracheal airway



   Final Endotracheal Airway:  ETT



   Cuffed: Yes



   Technique Used:  Direct laryngoscopy



   Insertion Site:  Oral



   Blade Type:  Val



   Laryngoscope Blade/Videolaryngoscope Blade Size:  3



   ETT Size (mm):  7.0



   Cuff at minimum occlusion pressure: Yes



   Measured from:  Teeth



   ETT to Teeth (cm):  21



   Placement Verified by: CO2 detection, direct visualization and equal breath 
sounds



   Laryngoscopic view:  Grade I - full view of glottis



   Rapid Sequence Induction (RSI): No



   Modified RSI: No



   Number of Attempts at Approach:  1



    Atraumatic insertion. Dentition remained intact.









 SURGICAL PATHOLOGY  Routine  2018  7:41 AM    Results for this



 REQUEST    CDT    procedure are in



         the results



         section.

 

 FUSION, SPINE,    2018  7:30 AM  Other secondary  



 LUMBAR, POSTERIOR    CDT  scoliosis  



 APPROACH        









   Case Notes







   PT TBA ON 7/10, DR MERINO ASSISTING,  [place] PRO AXIS TABLE, STEALTH S7, 
OARM,



   AQUAMANTYS, BONE SCAPEL, MEDTRONIC INSTRUMENATION, EMG, MEPS, SSEPS

 

   Special Needs







   PT TBA ON 7/10, DR MERINO CO-SURGEON,  [place] PRO AXIS TABLE, STEALTH S7, 
OARM,



   AQUAMANTYS, BONE SCAPEL, MEDTRONIC INSTRUMENATION, EMG, MEPS, SSEPS









 FUSION, SPINE, LUMBAR, XLIF    2018  7:30 AM CDT  Other secondary 
scoliosis  









   Case Notes







   PT TBA ON 7/10, DR MERINO ASSISTING,  [place] PRO AXIS TABLE, STEALTH S7, 
OARM,



   AQUAMANTYS, BONE SCAPEL, MEDTRONIC INSTRUMENATION, EMG, MEPS, SSEPS

 

   Special Needs







   PT TBA ON 7/10, DR MERINO CO-SURGEON,  [place] PRO AXIS TABLE, STEALTH S7, 
OARM,



   AQUAMANTYS, BONE SCAPEL, MEDTRONIC INSTRUMENATION, EMG, MEPS, SSEPS









 POC GLUCOSE  Routine  2018  5:47 AM CDT    Results for this



         procedure are in the



         results section.

 

 PARTIAL THROMBOPLASTIN TIME  Routine  2018  4:28 AM CDT    Results for 
this



 (PTT)        procedure are in the



         results section.

 

 PROTHROMBIN TIME WITH INR  Routine  2018  4:28 AM CDT    Results for this



         procedure are in the



         results section.

 

 HC COMPLETE BLD COUNT W/AUTO  Routine  2018  4:28 AM CDT    Results for 
this



 DIFF        procedure are in the



         results section.

 

 POC GLUCOSE  Routine  2018  5:30 PM CDT    Results for this



         procedure are in the



         results section.

 

 PREPARE RBC  Timed  2018  3:00 PM CDT    Results for this



         procedure are in the



         results section.

 

 PREPARE RBC  Routine  2018  3:00 PM CDT    Results for this



         procedure are in the



         results section.

 

 TYPE AND SCREEN  Routine  2018  3:00 PM CDT    Results for this



         procedure are in the



         results section.

 

 XR ABDOMEN 1 VW PORTABLE  Routine  2018 12:51 PM CDT    Results for this



         procedure are in the



         results section.

 

 POC GLUCOSE  Routine  2018 11:39 AM CDT    Results for this



         procedure are in the



         results section.

 

 POC GLUCOSE  Routine  2018  7:43 AM CDT    Results for this



         procedure are in the



         results section.

 

 HEMOGLOBIN  Routine  2018  4:00 AM CDT    Results for this



         procedure are in the



         results section.

 

 POC GLUCOSE  Routine  2018  9:57 PM CDT    Results for this



         procedure are in the



         results section.

 

 XR SPINE SCOLIOSIS 2-3 VIEWS  Routine  2018  8:49 PM CDT    Results for 
this



         procedure are in the



         results section.

 

 POC GLUCOSE  Routine  2018  5:02 PM CDT    Results for this



         procedure are in the



         results section.

 

 POC GLUCOSE  Routine  2018 11:25 AM CDT    Results for this



         procedure are in the



         results section.

 

 POC GLUCOSE  Routine  2018  8:22 AM CDT    Results for this



         procedure are in the



         results section.

 

 ZZESTIMATED GFR  Routine  2018  3:20 AM CDT    Results for this



         procedure are in the



         results section.

 

 PROTHROMBIN TIME WITH INR  Routine  2018  3:20 AM CDT    Results for this



         procedure are in the



         results section.

 

 PARTIAL THROMBOPLASTIN TIME  Routine  2018  3:20 AM CDT    Results for 
this



 (PTT)        procedure are in the



         results section.

 

 HC COMPLETE BLD COUNT W/AUTO  Routine  2018  3:20 AM CDT    Results for 
this



 DIFF        procedure are in the



         results section.

 

 BASIC METABOLIC PANEL  Routine  2018  3:20 AM CDT    Results for this



         procedure are in the



         results section.

 

 POC GLUCOSE  Routine  07/10/2018  6:07 PM CDT    Results for this



         procedure are in the



         results section.

 

 CT THORACIC SPINE WO  Routine  07/10/2018  5:00 PM CDT    Results for this



 CONTRAST        procedure are in the



         results section.

 

 CT LUMBAR SPINE WO CONTRAST  Routine  07/10/2018  4:59 PM CDT    Results for 
this



         procedure are in the



         results section.

 

 LACTIC ACID LEVEL  Routine  07/10/2018  3:00 PM CDT    Results for this



         procedure are in the



         results section.

 

 LACTIC ACID, SYRINGE  STAT  07/10/2018  1:34 PM CDT    Results for this



         procedure are in the



         results section.

 

 ARTERIAL BLOOD GAS  STAT  07/10/2018  1:34 PM CDT    Results for this



         procedure are in the



         results section.

 

 SURGICAL PATHOLOGY REQUEST  Routine  07/10/2018  1:15 PM CDT    Results for 
this



         procedure are in the



         results section.

 

 POC GLUCOSE  Routine  07/10/2018  1:11 PM CDT    Results for this



         procedure are in the



         results section.

 

 LACTIC ACID, SYRINGE  STAT  07/10/2018 12:19 PM CDT    Results for this



         procedure are in the



         results section.

 

 IONIZED CALCIUM, ARTERIAL  STAT  07/10/2018 12:19 PM CDT    Results for this



         procedure are in the



         results section.

 

 GLUCOSE LEVEL, SYRINGE  STAT  07/10/2018 12:19 PM CDT    Results for this



         procedure are in the



         results section.

 

 POTASSIUM, SYRINGE  STAT  07/10/2018 12:19 PM CDT    Results for this



         procedure are in the



         results section.

 

 SODIUM LEVEL, SYRINGE  STAT  07/10/2018 12:19 PM CDT    Results for this



         procedure are in the



         results section.

 

 HEMOGLOBIN, SYRINGE  STAT  07/10/2018 12:19 PM CDT    Results for this



         procedure are in the



         results section.

 

 ARTERIAL BLOOD GAS,  STAT  07/10/2018 12:19 PM CDT    Results for this



 CORRECTED        procedure are in the



         results section.

 

 MAGNESIUM LEVEL  STAT  07/10/2018 12:05 PM CDT    Results for this



         procedure are in the



         results section.

 

 OR FL > 1 HOUR  Routine  07/10/2018 12:00 PM CDT    Results for this



         procedure are in the



         results section.

 

 LACTIC ACID, SYRINGE  STAT  07/10/2018 10:55 AM CDT    Results for this



         procedure are in the



         results section.

 

 IONIZED CALCIUM, ARTERIAL  STAT  07/10/2018 10:55 AM CDT    Results for this



         procedure are in the



         results section.

 

 MAGNESIUM LEVEL  STAT  07/10/2018 10:55 AM CDT    Results for this



         procedure are in the



         results section.

 

 GLUCOSE LEVEL, SYRINGE  STAT  07/10/2018 10:55 AM CDT    Results for this



         procedure are in the



         results section.

 

 HEMOGLOBIN, SYRINGE  STAT  07/10/2018 10:55 AM CDT    Results for this



         procedure are in the



         results section.

 

 POTASSIUM, SYRINGE  STAT  07/10/2018 10:55 AM CDT    Results for this



         procedure are in the



         results section.

 

 SODIUM LEVEL, SYRINGE  STAT  07/10/2018 10:55 AM CDT    Results for this



         procedure are in the



         results section.

 

 ARTERIAL BLOOD GAS,  STAT  07/10/2018 10:55 AM CDT    Results for this



 CORRECTED        procedure are in the



         results section.

 

 ARTERIAL LINE  Routine  07/10/2018  9:30 AM CDT    









   Procedure Note - Keith Reece MD - 07/10/2018  9:30 AM CDT



Arterial line



   Performed by: JUANITA GASPAR



   Authorized by: KEITH REECE



   



   Patient Location:  OR



   Start Time:  7/10/2018 7:46 AM



   End Time:  7/10/2018 7:49 AM



   Staff:



     Anesthesiologist:  KEITH REECE



     Resident/CRNA/AA:  JUANITA GASPAR



     Performed by:  Anesthesiologist



   Pre-procedure: patient identified, IV checked, site and side verified, risks 
and benefits discussed, procedure verified, surgical consent complete, patient 
position confirmed, monitors and equipment checked and pre-op evaluation 
complete



   MSBT: antiseptic used, all elements of maximal sterile barrier technique 
followed, hand hygiene performed, cap/gown used by other personnel and 
solutions labeled



   TIme Out Performed:  7/10/2018 7:45 AM



   Indications:



     Indications: multiple ABGs and hemodynamic monitoring



   Anesthesia:



     Anesthesia:  General



   Procedure Details:



     Arterial Line placement:  Placed post induction



     Line placement site:  Radial



   Line placement side:  Right



     Arterial line gauge:  20 G



   Number of attempts:  1



   Ultrasound guidance used: No



   Post-procedure:



     Post-procedure:  Sterile dressing applied



     Post procedure circulation, sensation, movement:  Normal and unchanged



     Patient tolerance:  Patient tolerated the procedure well with no immediate 
complications



   Notes:



      A-line placed by Dr. Reece









 NC AN ELECTIVE ENDOTRACHEAL AIRWAY  Routine  07/10/2018  8:53 AM CDT    









   Procedure Note - Juanita Gaspar CRNA - 07/10/2018  8:53 AM CDT



Airway



   Date/Time: 7/10/2018 7:47 AM



   Performed by: JUANITA GASPAR



   Authorized by: KETIH REECE



   



   Location:  OR



   Urgency:  Elective



   Difficult Airway: No



   Anesthesiologist:  KEITH REECE



   Resident/CRNA/AA:  JUANITA GASPAR



   Performed by:



      /CRNA/AA



   Preoxygenated with 100% O2: Yes



   C-spine Precautions Maintained Throughout: No



   Mask Ventilation:  Easy mask



   Final Airway Type:  Endotracheal airway



   Final Endotracheal Airway:  ETT



   Cuffed: Yes



   Technique Used:  Video laryngoscopy



   Devices/Methods Used in Placement:  Intubating stylet



   Insertion Site:  Oral



   Laryngoscope Blade/Videolaryngoscope Blade Size:  3



   ETT Size (mm):  7.0



   Cuff at minimum occlusion pressure: Yes



   Measured from:  Teeth



   ETT to Teeth (cm):  22



   Placement Verified by: CO2 detection, direct visualization and equal breath 
sounds



   Laryngoscopic view:  Grade I - full view of glottis



   Rapid Sequence Induction (RSI): No



   Modified RSI: No



   Number of Attempts at Approach:  2



    First attempt with MAC 3 blade via DL unsuccessful - no view.  Second 
attempt with Glidescope 3, easy intubation, grade 1 view, teeth/lips unchanged 
from preop condition









 LACTIC ACID, SYRINGE  STAT  07/10/2018  8:30 AM    Results for this



     CDT    procedure are in



         the results



         section.

 

 MAGNESIUM LEVEL  STAT  07/10/2018  8:30 AM    Results for this



     CDT    procedure are in



         the results



         section.

 

 GLUCOSE LEVEL,  STAT  07/10/2018  8:30 AM    Results for this



 SYRINGE    CDT    procedure are in



         the results



         section.

 

 HEMOGLOBIN, SYRINGE  STAT  07/10/2018  8:30 AM    Results for this



     CDT    procedure are in



         the results



         section.

 

 IONIZED CALCIUM,  STAT  07/10/2018  8:30 AM    Results for this



 ARTERIAL    CDT    procedure are in



         the results



         section.

 

 POTASSIUM, SYRINGE  STAT  07/10/2018  8:30 AM    Results for this



     CDT    procedure are in



         the results



         section.

 

 SODIUM LEVEL,  STAT  07/10/2018  8:30 AM    Results for this



 SYRINGE    CDT    procedure are in



         the results



         section.

 

 ARTERIAL BLOOD GAS,  STAT  07/10/2018  8:30 AM    Results for this



 CORRECTED    CDT    procedure are in



         the results



         section.

 

 FUSION, SPINE,    07/10/2018  7:30 AM  Other secondary  



 LUMBAR, XLIF    CDT  scoliosis  









   Case Notes







   DR JEANIE MOHR TO DO EXPOSURE, C-ARM. AMSCO BED, NUVASIVE INSTRUMENATION, EMG, 
SSEP,



   MEPS

 

   Special Needs







   DR JEANIE MOHR TO DO EXPOSURE, C-ARM. AMSCO BED, NUVASIVE INSTRUMENATION, EMG, 
SSEP,



   MEPS









 POC GLUCOSE  Routine  07/10/2018  6:54    Results for this



     AM CDT    procedure are in



         the results



         section.

 

 XR CHEST 2 VW  Routine  2018  5:24  Preop testing  Results for this



     PM CDT    procedure are in



         the results



         section.

 

 URINE CULTURE  Routine  2018  4:50    Results for this



     PM CDT    procedure are in



         the results



         section.

 

 ECG 12-LEAD  Routine  2018  4:44  Preop testing  Results for this



     PM CDT    procedure are in



         the results



         section.

 

 ZZESTIMATED GFR  Routine  2018  4:21    Results for this



     PM CDT    procedure are in



         the results



         section.

 

 BASIC METABOLIC PANEL  Routine  2018  4:21  Preop testing  Results for 
this



     PM CDT    procedure are in



         the results



         section.

 

 CBC HEMOGRAM  Routine  2018  4:21  Preop testing  Results for this



     PM CDT    procedure are in



         the results



         section.

 

 URINALYSIS SCREEN AND  Routine  2018  4:21  Preop testing  Results for 
this



 MICROSCOPY, WITH    PM CDT    procedure are in



 REFLEX TO CULTURE        the results



         section.

 

 HEPATIC FUNCTION PANEL  Routine  2018  4:21  Preop testing  Results for 
this



     PM CDT    procedure are in



         the results



         section.

 

 PARTIAL THROMBOPLASTIN  Routine  2018  4:21  Preop testing  Results for 
this



 TIME (PTT)    PM CDT    procedure are in



         the results



         section.

 

 PROTHROMBIN TIME WITH  Routine  2018  4:21  Preop testing  Results for 
this



 INR    PM CDT    procedure are in



         the results



         section.

 

 TYPE AND SCREEN  Routine  2018  4:21  Preop testing  Results for this



     PM CDT    procedure are in



         the results



         section.

 

 VITAMIN D 25 HYDROXY  Routine  2018  4:21  Preop testing  Results for 
this



 LEVEL    PM CDT    procedure are in



         the results



         section.

 

 PARATHYROID HORMONE  Routine  2018  4:21  Preop testing  Results for this



     PM CDT    procedure are in



         the results



         section.

 

 PHOSPHORUS LEVEL  Routine  2018  4:21  Preop testing  Results for this



     PM CDT    procedure are in



         the results



         section.

 

 MAGNESIUM LEVEL  Routine  2018  4:21  Preop testing  Results for this



     PM CDT    procedure are in



         the results



         section.

 

 HEMOGLOBIN A1C  Routine  2018  4:21  Preop testing  Results for this



     PM CDT    procedure are in



         the results



         section.

 

 BONE DENSITY  Routine  2018  5:25  Age-related  Results for this



 PERIPHERAL    PM CDT  osteoporosis with  procedure are in



       current pathological  the results



       fracture, initial  section.



       encounter  

 

 BONE DENSITY  Routine  2018  5:25  Age-related  Results for this



     PM CDT  osteoporosis with  procedure are in



       current pathological  the results



       fracture, initial  section.



       encounter  



after 2018



Results

XR Spine Scoliosos 2-3 Views (2019  2:09 PM CST)Only the most recent of3 
resultswithin the time period is included.





 Narrative  Performed At

 

 EXAMINATION:XR SPINE SCOLIOSIS 2-3 VIEWS



  [facility]



  



  



 CLINICAL HISTORY:M41.9 Scoliosisunspecified, SCOLIOSIS



  



  



  



  



  



  



  



 COMPARISON: 2018.



  



  



  



 IMPRESSION:



  



  



  



 9 views of the spine are submitted per scoliosis protocol.



  



  



  



 Again noted is posterior fusion extending from T4 through the sacroiliac  



 joints. Interbody fusions are again noted at L2-3, L4-5, and L5-S1. ACDF  



 is again seen at C5-6.



  



  



  



 There is interval fracture of the left sacroiliac screw.



  



  



  



 Thoracic dextroscoliosis is similar to the comparison study, measuring  



 35 degrees. Cervicothoracic and lumbar levocurvature is again seen as  



 well.



  



  



  



 The plumbline measures 3.7 cm left of the mid sacral line. 



  



  



  



 The plumbline measures 5.8 cm ventral to the posterior superior corner  



 of S1.



  



  



  



 A pump overlies the right abdomen. An intrathecal catheter terminates at  



 the T11 level.



  



  



  



 There are bilateral hip arthroplasties.



  



  



  



 [id number]



  



   









 Procedure Note

 

  Interface, Radiology Results Incoming - 2019  2:47 PM CST



EXAMINATION:  XR SPINE SCOLIOSIS 2-3 VIEWS



 



 CLINICAL HISTORY:  M41.9 Scoliosis  unspecified, SCOLIOSIS



 



 



 



 COMPARISON: 2018.



 



 IMPRESSION:



 



 9 views of the spine are submitted per scoliosis protocol.



 



 Again noted is posterior fusion extending from T4 through the sacroiliac 
joints. Interbody fusions are again noted at L2-3, L4-5, and L5-S1. ACDF is 
again seen at C5-6.



 



 There is interval fracture of the left sacroiliac screw.



 



 Thoracic dextroscoliosis is similar to the comparison study, measuring 35 
degrees. Cervicothoracic and lumbar levocurvature is again seen as well.



 



 The plumbline measures 3.7 cm left of the mid sacral line.



 



 The plumbline measures 5.8 cm ventral to the posterior superior corner of S1.



 



 A pump overlies the right abdomen. An intrathecal catheter terminates at the 
T11 level.



 



 There are bilateral hip arthroplasties.



 



 [id number]









 Performing Organization  Address  City/State/Zipcode  Phone Number

 

  [facility]  [address] [place] [address]  



Transfuse RBC (2018  5:55 PM CDT)Only the most recent of4 resultswithin 
the time period is included.POC glucose (2018  7:13 AM CDT)Only the most 
recent of87 resultswithin the time period is included.





 Component  Value  Ref Range  Performed At

 

 POC glucose  106 (H)  65 - 99 mg/dL  [facility] DEPARTMENT OF PATHOLOGY



   Comment:    AND GENOMIC MEDICINE



   [facility] Notified RN    



   Meter ID: [id number]    



   : Edwin KESSLER    



       









 Performing Organization  Address  City/State[facility]  Phone Number

 

 [facility] AND  [address] [place] [phone]  



 [facility] MEDICINE      



Estimated GFR (2018  4:00 AM CDT)Only the most recent of22 resultswithin 
the time period is included.





 Component  Value  Ref Range  Performed At

 

 GFR Non Af Amer  74  mL/min/1.73 m2  [facility] DEPARTMENT OF



       PATHOLOGY AND GENOMIC



       MEDICINE

 

 GFR Af Amer  90  mL/min/1.73 m2  [facility] DEPARTMENT OF



   Comment:    PATHOLOGY AND [facility]



   Chronic kidney disease: <60 mL/min/1.73m2    MEDICINE



   Kidney failure: <15 mL/min/1.73m2    



   The estimated GFR is calculated from the IDMS-traceable Modification of Diet
    



   in Renal Disease Equation. The accuracy of the calculation is poor when the 
   



   creatinine is normal. Calculated values >90 mL/min/1.73m2 are not reported. 
   



   This equation has not been validated in children (<18 years), pregnant    



   women, the elderly (>70 years), or ethnic groups other than Caucasians and  
  



    Americans.    



       









 Specimen

 

 Plasma specimen









 Performing Organization  Address  City/[facility]  Phone Number

 

 [facility] AND  [address] [place] [phone]  



 [facility]      



Basic metabolic panel (2018  4:00 AM CDT)Only the most recent of18 
resultswithin the time period is included.





 Component  Value  Ref Range  Performed At

 

 Sodium  139  135 - 148 mEq/L  [facility] DEPARTMENT OF PATHOLOGY AND GENOMIC MEDICINE

 

 Potassium  3.9  3.5 - 5.0 mEq/L  [facility] DEPARTMENT OF PATHOLOGY AND GENOMIC 
MEDICINE

 

 Chloride  100  98 - 112 mEq/L  [facility] DEPARTMENT OF PATHOLOGY AND GENOMIC MEDICINE

 

 CO2  27  24 - 31 mEq/L  [facility] DEPARTMENT OF PATHOLOGY AND GENOMIC MEDICINE

 

 Anion gap  12@ANIO  7 - 15 mEq/L  [facility] DEPARTMENT OF PATHOLOGY AND GENOMIC 
MEDICINE

 

 BUN  9  6 - 20 mg/dL  [facility] DEPARTMENT OF PATHOLOGY AND GENOMIC MEDICINE

 

 Creatinine  0.8  0.5 - 0.9 mg/dL  [facility] DEPARTMENT OF PATHOLOGY AND GENOMIC 
MEDICINE

 

 Glucose  102 (H)  65 - 99 mg/dL  [facility] DEPARTMENT OF PATHOLOGY AND GENOMIC 
MEDICINE

 

 Calcium  9.1  8.3 - 10.2 mg/dL  [facility] DEPARTMENT OF PATHOLOGY AND GENOMIC 
MEDICINE









 Specimen

 

 Plasma specimen









 Performing Organization  Address  City/State/Zipcode  Phone Number

 

 [facility] DEPARTMENT OF PATHOLOGY AND  [address] [place] [address]  



 GENOMIC MEDICINE      



Smear review (2018  5:00 AM CDT)Only the most recent of5 resultswithin 
the time period is included.





 Component  Value  Ref Range  Performed At

 

 Platelet slide review  Solitario adequate    [facility] DEPARTMENT OF PATHOLOGY AND GENOMIC



       MEDICINE

 

 Anisocytosis  Moderate    [facility] DEPARTMENT OF PATHOLOGY AND GENOMIC



       MEDICINE

 

 Polychromasia  Moderate    [facility] DEPARTMENT OF PATHOLOGY AND GENOMIC



       MEDICINE

 

 Ovalocytes  Moderate    [facility] DEPARTMENT OF PATHOLOGY AND GENOMIC



       MEDICINE

 

 Enlarged platelets  Moderate (A)    [facility] DEPARTMENT OF PATHOLOGY AND GENOMIC



       MEDICINE









 Performing Organization  Address  City/State/Zipcode  Phone Number

 

 [facility] DEPARTMENT OF PATHOLOGY AND  [address] [place] [phone]  



 GENOMIC MEDICINE      



CBC with platelet and differential (2018  5:00 AM CDT)Only the most 
recent of15 resultswithin the time period is included.





 Component  Value  Ref Range  Performed At

 

 WBC  4.06 (L)  4.50 - 11.00 k/uL  [facility] DEPARTMENT OF



       PATHOLOGY AND GENOMIC



       MEDICINE

 

 RBC  2.73 (L)  4.20 - 5.50 m/uL  [facility] DEPARTMENT OF



       PATHOLOGY AND GENOMIC



       MEDICINE

 

 HGB  8.4 (L)  12.0 - 16.0 g/dL  [facility] DEPARTMENT OF



       PATHOLOGY AND GENOMIC



       MEDICINE

 

 HCT  27.1 (L)  37.0 - 47.0 %  [facility] DEPARTMENT OF



       PATHOLOGY AND GENOMIC



       MEDICINE

 

 MCV  99.3  82.0 - 100.0 fL  [facility] DEPARTMENT OF



       PATHOLOGY AND GENOMIC



       MEDICINE

 

 MCH  30.8  27.0 - 34.0 pg  [facility] DEPARTMENT OF



       PATHOLOGY AND GENOMIC



       MEDICINE

 

 MCHC  31.0  31.0 - 37.0 g/dL  [facility] DEPARTMENT OF



       PATHOLOGY AND GENOMIC



       MEDICINE

 

 RDW - SD  65.3 (H)  37.0 - 55.0 fL  [facility] DEPARTMENT OF



       PATHOLOGY AND GENOMIC



       MEDICINE

 

 MPV  11.2  8.8 - 13.2 fL  [facility] DEPARTMENT OF



       PATHOLOGY AND GENOMIC



       MEDICINE

 

 Platelet count  151  150 - 400 k/uL  [facility] DEPARTMENT OF



       PATHOLOGY AND GENOMIC



       MEDICINE

 

 Nucleated RBC  0.00  /100 WBC  [facility] DEPARTMENT OF



       PATHOLOGY AND GENOMIC



       MEDICINE

 

 Neutrophils  44.2  39.0 - 69.0 %  [facility] DEPARTMENT OF



       PATHOLOGY AND GENOMIC



       MEDICINE

 

 Lymphocytes  39.9  25.0 - 45.0 %  [facility] DEPARTMENT OF



       PATHOLOGY AND GENOMIC



       MEDICINE

 

 Monocytes  13.8 (H)  0.0 - 10.0 %  [facility] DEPARTMENT OF



       PATHOLOGY AND GENOMIC



       MEDICINE

 

 Eosinophils  1.2  0.0 - 5.0 %  [facility] DEPARTMENT OF



       PATHOLOGY AND GENOMIC



       MEDICINE

 

 Basophils  0.2  0.0 - 1.0 %  [facility] DEPARTMENT OF



       PATHOLOGY AND GENOMIC



       MEDICINE

 

 Immature granulocytes  0.7Comment:  0.0 - 1.0 %  [facility] DEPARTMENT OF



   "Immature    PATHOLOGY AND GENOMIC



   granulocytes"    MEDICINE



   (promyelocytes,    



   myelocytes,    



   metamyelocytes)    









 Specimen

 

 Blood









 Performing Organization  Address  City/[facility]  Phone Number

 

 [facility] DEPARTMENT OF PATHOLOGY AND  [address] [place] [address]      



Thyroid stimulating hormone (2018  5:00 AM CDT)





 Component  Value  Ref Range  Performed At

 

 TSH  2.00  0.27 - 4.20 uIU/mL  [facility] DEPARTMENT OF PATHOLOGY AND GENOMIC MEDICINE









 Specimen

 

 Plasma specimen









 Performing Organization  Address  City/[facility]  Phone Number

 

 [facility] DEPARTMENT OF PATHOLOGY AND  [address] [place], [place] [id number]  



 [facility]      



Urinalysis screen and microscopy, with reflex to culture (2018  9:16 PM 
CDT)Only the most recent of2 resultswithin the time period is included.





 Component  Value  Ref Range  Performed At

 

 Specimen site  Clean catch    [facility] DEPARTMENT OF PATHOLOGY AND



       GENOMIC MEDICINE

 

 Color, UA  Straw    [facility] DEPARTMENT OF PATHOLOGY AND



       GENOMIC MEDICINE

 

 Appearance, UA  Clear    [facility] DEPARTMENT OF PATHOLOGY AND



       GENOMIC MEDICINE

 

 Specific gravity, UA  1.006  1.001 - 1.035  [facility] DEPARTMENT OF PATHOLOGY AND



       GENOMIC MEDICINE

 

 pH, UA  6.0  5.0 - 8.5  [facility] DEPARTMENT OF PATHOLOGY AND



       GENOMIC MEDICINE

 

 Protein, UA  Negative  Negative  [facility] DEPARTMENT OF PATHOLOGY AND



       GENOMIC MEDICINE

 

 Glucose, UA  Negative  Negative  [facility] DEPARTMENT OF PATHOLOGY AND



       GENOMIC MEDICINE

 

 Ketones, UA  Negative  Negative  [facility] DEPARTMENT OF PATHOLOGY AND



       GENOMIC MEDICINE

 

 Bilirubin, UA  Negative  Negative  [facility] DEPARTMENT OF PATHOLOGY AND



       GENOMIC MEDICINE

 

 Blood, UA  Negative  Negative  [facility] DEPARTMENT OF PATHOLOGY AND



       GENOMIC MEDICINE

 

 Nitrite, UA  Negative  Negative  [facility] DEPARTMENT OF PATHOLOGY AND



       GENOMIC MEDICINE

 

 Urobilinogen, UA  <2.0  <2.0  [facility] DEPARTMENT OF PATHOLOGY AND



       GENOMIC MEDICINE

 

 Leukocyte esterase, UA  Negative  Negative  [facility] DEPARTMENT OF PATHOLOGY AND



       GENOMIC MEDICINE

 

 Epithelial cells, UA  5  /HPF  [facility] DEPARTMENT OF PATHOLOGY AND



       GENOMIC MEDICINE

 

 WBC, UA  1  0 - 4 /HPF  [facility] DEPARTMENT OF PATHOLOGY AND



       GENOMIC MEDICINE

 

 RBC, UA  None seen  0 - 5 /HPF  [facility] DEPARTMENT OF PATHOLOGY AND



       GENOMIC MEDICINE

 

 Bacteria, UA  Few  None seen  [facility] DEPARTMENT OF PATHOLOGY AND



       GENOMIC MEDICINE

 

 Yeast, UA  None seen    [facility] DEPARTMENT OF PATHOLOGY AND



       GENOMIC MEDICINE

 

 Yeast with pseudohyphae, UA  None seen    [facility] DEPARTMENT OF PATHOLOGY AND



       GENOMIC MEDICINE









 Specimen

 

 Urine









 Performing Organization  Address  City/[facility]  Phone Number

 

 [facility] DEPARTMENT OF PATHOLOGY AND  [address] [place] [address]  



 [facility]      



Urine culture (2018  9:16 PM CDT)Only the most recent of2 resultswithin 
the time period is included.





 Component  Value  Ref Range  Performed At

 

 Urine culture  SEE COMMENTComment: Bacteriuria    [facility] DEPARTMENT OF PATHOLOGY



   screen negative.    AND GENOMIC MEDICINE









 Performing Organization  Address  City/State/[facility]  Phone Number

 

 [facility] DEPARTMENT OF PATHOLOGY AND  [address] [place] [phone]  



 [facility]      



NM Lung Ventilation Perfusion (2018  8:29 PM CDT)





 Narrative  Performed At

 

 CLINICAL HISTORY: PE suspectedlow pretest prob, Taqchycardia unknow   
[facility]



 etiology- S P Spine sx



  



  



  



 TECHNIQUE:



  



 The patient breathed 15-20 mCi of xenon-133 gas through a closed  



 ventilation system while dynamic imaging of the lungs was performed in  



 the posterior and anterior projections. The patient was then injected  



 with 5 mCi of technetium-99m-MAA intravenously, 



  



 followed by imaging of the lungs in anterior, posterior, and oblique  



 projections. 



  



  



  



 FINDINGS: 



  



 Small perfusion defect left upper lobe likely related to aortic arch.  



 Mildly reduced perfusion and ventilation right upper lobe posteriorly. 



  



  



  



 IMPRESSION: 



  



  



  



 Low Probability for pulmonary embolism.



  



  



  



  



  



  



  



  



  



 [facility]



  



   









 Procedure Note

 

  Interface, Radiology Results Incoming - 2018  8:35 PM CDT



CLINICAL HISTORY: PE suspected  low pretest prob, Taqchycardia unknow etiology- 
S P Spine sx



 



 TECHNIQUE:



 The patient breathed 15-20 mCi of xenon-133 gas through a closed ventilation 
system while dynamic imaging of the lungs was performed in the posterior and 
anterior projections. The patient was then injected with 5



 mCi of technetium-99m-MAA intravenously,



 followed by imaging of the lungs in anterior, posterior, and oblique 
projections.



 



 FINDINGS:



 Small perfusion defect left upper lobe likely related to aortic arch. Mildly 
reduced perfusion and ventilation right upper lobe posteriorly.



 



 IMPRESSION:



 



     Low Probability for pulmonary embolism.



 



 



 



 



 [facility]









 Performing Organization  Address  City/[facility]/Zipcode  Phone Number

 

  [facility]  [address] [place] [address]  



XR Chest 1 Vw Portable (2018  5:11 PM CDT)Only the most recent of4 
resultswithin the time period is included.





 Narrative  Performed At

 

 EXAMINATION: Portable chest x-ray



   [facility]



  



  



 CLINICAL HISTORY:sob



  



  



  



  



  



 COMPARISON: Most recent available chest x-ray.



  



  



  



 Heart size is enlarged.



  



 A PICC line is located in the superior vena cava.



  



 There are transpedicular screws and stabilizing bars throughout the  



 dorsal lumbar spine scoliotic curvature. There is internal interbody  



 fixation of the lower cervical spine



  



  



  



 IMPRESSION:



  



  



  



 1.There are decreased lung volumes with crowding of blood vessels in  



 the infrahilar regions. There are no focal infiltrates.



  



 2.Vascular congestion has improved



  



 3.Mild retrocardiac atelectasis and a trace of pleural fluid.



  



 4.No pneumothorax.



  



  



  



  



  



  



  



 [id number]



  



   









 Procedure Note

 

 Hm Interface, Radiology Results Incoming - 2018  5:23 PM CDT



EXAMINATION: Portable chest x-ray



 



 CLINICAL HISTORY:  sob



 



 



 COMPARISON: Most recent available chest x-ray.



 



 Heart size is enlarged.



 A PICC line is located in the superior vena cava.



 There are transpedicular screws and stabilizing bars throughout the dorsal 
lumbar spine scoliotic curvature. There is internal interbody fixation of the 
lower cervical spine



 



 IMPRESSION:



 



 1.  There are decreased lung volumes with crowding of blood vessels in the 
infrahilar regions. There are no focal infiltrates.



 2.  Vascular congestion has improved



 3.  Mild retrocardiac atelectasis and a trace of pleural fluid.



 4.  No pneumothorax.



 



 



 



 [id number]









 Performing Organization  Address  City/State/Zipcode  Phone Number

 

  [facility]  [address] [place] [address]  



Comprehensive metabolic panel (2018 12:55 PM CDT)Only the most recent of4 
resultswithin the time period is included.





 Component  Value  Ref Range  Performed At

 

 Sodium  140  135 - 148 mEq/L  [facility] DEPARTMENT OF



       PATHOLOGY AND GENOMIC



       MEDICINE

 

 Potassium  3.3 (L)  3.5 - 5.0 mEq/L  [facility] DEPARTMENT OF



       PATHOLOGY AND GENOMIC



       MEDICINE

 

 Chloride  101  98 - 112 mEq/L  [facility] DEPARTMENT OF



       PATHOLOGY AND GENOMIC



       MEDICINE

 

 CO2  25  24 - 31 mEq/L  [facility] DEPARTMENT OF



       PATHOLOGY AND GENOMIC



       MEDICINE

 

 Anion gap  14@ANIO  7 - 15 mEq/L  [facility] DEPARTMENT OF



       PATHOLOGY AND GENOMIC



       MEDICINE

 

 BUN  7  6 - 20 mg/dL  [facility] DEPARTMENT OF



       PATHOLOGY AND GENOMIC



       MEDICINE

 

 Creatinine  0.7  0.5 - 0.9 mg/dL  [facility] DEPARTMENT OF



       PATHOLOGY AND GENOMIC



       MEDICINE

 

 Glucose  125 (H)  65 - 99 mg/dL  [facility] DEPARTMENT OF



       PATHOLOGY AND GENOMIC



       MEDICINE

 

 Calcium  8.5  8.3 - 10.2 mg/dL  [facility] DEPARTMENT OF



       PATHOLOGY AND GENOMIC



       MEDICINE

 

 Protein  6.9  6.3 - 8.3 g/dL  [facility] DEPARTMENT OF



   Comment:    PATHOLOGY AND GENOMIC



    4.6-7.0 g/dL    MEDICINE



   1 week 4.4-7.6 g/dL    



   7 months-1year5.1-7.3 g/dL    



   1-2 years5.6-7.5 g/dL    



   >3 years6.0-8.0 g/dL    



    6.3-8.3 g/dL    



       

 

 Albumin  2.4 (L)  3.5 - 5.0 g/dL  [facility] DEPARTMENT OF



       PATHOLOGY AND GENOMIC



       MEDICINE

 

 A/G ratio  0.5 (L)  0.7 - 3.8  [facility] DEPARTMENT OF



       PATHOLOGY AND GENOMIC



       MEDICINE

 

 Alkaline phosphatase  95  35 - 104 U/L  [facility] DEPARTMENT OF



       PATHOLOGY AND GENOMIC



       MEDICINE

 

 AST  23  10 - 35 U/L  [facility] DEPARTMENT OF



       PATHOLOGY AND GENOMIC



       MEDICINE

 

 ALT  32  5 - 50 U/L  [facility] DEPARTMENT OF



       PATHOLOGY AND GENOMIC



       MEDICINE

 

 Total bilirubin  <0.2  0.0 - 1.2 mg/dL  [facility] DEPARTMENT OF



       PATHOLOGY AND GENOMIC



       MEDICINE









 Specimen

 

 Plasma specimen









 Performing Organization  Address  City/[facility]  Phone Number

 

 [facility] DEPARTMENT OF PATHOLOGY AND  [address] [place] [phone]  



 GENOMIC MEDICINE      



Hemoglobin A1c (2018  7:02 AM CDT)Only the most recent of2 resultswithin 
the time period is included.





 Component  Value  Ref Range  Performed At

 

 Hemoglobin A1C  5.5  4.0 - 5.6 %  [facility] DEPARTMENT OF PATHOLOGY



   Comment:    AND GENOMIC MEDICINE



   HbA1c cutoffs for diagnosing diabetes:    



   4.0% - 5.6%=normal    



   5.7% - 6.4%=increased risk for diabetes (prediabetes)    



   >=6.5%=diabetes    



   Goals for glycemic control (ADA 2016)    



   < 7.0%Target for nonpregnant adults with diabetes.    



   More or less stringent targets may be appropriate for    



   individual patients.    



   <7.5% Target for Children and adolescents with type 1    



   diabetes.    



       









 Specimen

 

 Blood









 Performing Organization  Address  City/State/Zipcode  Phone Number

 

 [facility] DEPARTMENT OF PATHOLOGY AND  [address] [place] [phone]  



 [facility]      



Urinalysis, automated with microscopy (2018  8:30 PM CDT)





 Component  Value  Ref Range  Performed At

 

 Color, UA  Straw    [facility] DEPARTMENT OF PATHOLOGY AND



       GENOMIC MEDICINE

 

 Appearance, UA  Clear    [facility] DEPARTMENT OF PATHOLOGY AND



       GENOMIC MEDICINE

 

 Specific gravity, UA  1.006  1.001 - 1.035  [facility] DEPARTMENT OF PATHOLOGY AND



       GENOMIC MEDICINE

 

 pH, UA  6.0  5.0 - 8.5  [facility] DEPARTMENT OF PATHOLOGY AND



       GENOMIC MEDICINE

 

 Protein, UA  Negative  Negative  [facility] DEPARTMENT OF PATHOLOGY AND



       GENOMIC MEDICINE

 

 Glucose, UA  Negative  Negative  [facility] DEPARTMENT OF PATHOLOGY AND



       GENOMIC MEDICINE

 

 Ketones, UA  Negative  Negative  [facility] DEPARTMENT OF PATHOLOGY AND



       GENOMIC MEDICINE

 

 Bilirubin, UA  Negative  Negative  [facility] DEPARTMENT OF PATHOLOGY AND



       GENOMIC MEDICINE

 

 Blood, UA  Negative  Negative  [facility] DEPARTMENT OF PATHOLOGY AND



       GENOMIC MEDICINE

 

 Nitrite, UA  Negative  Negative  [facility] DEPARTMENT OF PATHOLOGY AND



       GENOMIC MEDICINE

 

 Urobilinogen, UA  <2.0  <2.0  [facility] DEPARTMENT OF PATHOLOGY AND



       GENOMIC MEDICINE

 

 Leukocyte esterase, UA  Negative  Negative  [facility] DEPARTMENT OF PATHOLOGY AND



       GENOMIC MEDICINE

 

 Epithelial cells, UA  6  /HPF  [facility] DEPARTMENT OF PATHOLOGY AND



       GENOMIC MEDICINE

 

 WBC, UA  None seen  0 - 4 /HPF  [facility] DEPARTMENT OF PATHOLOGY AND



       GENOMIC MEDICINE

 

 RBC, UA  None seen  0 - 5 /HPF  [facility] DEPARTMENT OF PATHOLOGY AND



       GENOMIC MEDICINE

 

 Bacteria, UA  None seen  None seen  [facility] DEPARTMENT OF PATHOLOGY AND



       GENOMIC MEDICINE

 

 Yeast, UA  None seen    [facility] DEPARTMENT OF PATHOLOGY AND



       GENOMIC MEDICINE

 

 Yeast with pseudohyphae, UA  None seen    [facility] DEPARTMENT OF PATHOLOGY AND



       GENOMIC MEDICINE









 Specimen

 

 Urine









 Performing Organization  Address  City/[facility]  Phone Number

 

 [facility] DEPARTMENT OF PATHOLOGY AND  [address] [place], [place] [phone]  



 [facility] MEDICINE      



Prealbumin level (2018  7:45 PM CDT)





 Component  Value  Ref Range  Performed At

 

 Prealbumin  15 (L)  16 - 32 mg/dL  [facility] DEPARTMENT OF PATHOLOGY AND GENOMIC 
MEDICINE









 Specimen

 

 Serum









 Performing Organization  Address  City/[facility]  Phone Number

 

 [facility] DEPARTMENT OF PATHOLOGY AND  [address] [place] [address] [phone] [place] [address]      



Phosphorus level (2018  7:45 PM CDT)Only the most recent of7 
resultswithin the time period is included.





 Component  Value  Ref Range  Performed At

 

 Phosphorus  3.0  2.4 - 4.5 mg/dL  [facility] DEPARTMENT OF PATHOLOGY AND GENOMIC 
MEDICINE









 Specimen

 

 Plasma specimen









 Performing Organization  Address  City/[facility]  Phone Number

 

 [facility] DEPARTMENT OF PATHOLOGY AND  [address] [place] [phone]  



 GENOMIC MEDICINE      



Magnesium level (2018  7:45 PM CDT)Only the most recent of12 
resultswithin the time period is included.





 Component  Value  Ref Range  Performed At

 

 Magnesium  2.2  1.6 - 2.6 mg/dL  [facility] DEPARTMENT OF PATHOLOGY AND GENOMIC 
MEDICINE









 Specimen

 

 Plasma specimen









 Performing Organization  Address  City/[facility]  Phone Number

 

 [facility] DEPARTMENT OF PATHOLOGY AND  [address] [place] [address] [phone] [place] [address] MEDICINE      



Potassium level (2018  8:22 AM CDT)Only the most recent of3 resultswithin 
the time period is included.





 Component  Value  Ref Range  Performed At

 

 Potassium  3.5  3.5 - 5.0 mEq/L  [facility] DEPARTMENT OF PATHOLOGY AND GENOMIC 
MEDICINE









 Specimen

 

 Plasma specimen









 Performing Organization  Address  City/[facility]  Phone Number

 

 [facility] DEPARTMENT OF PATHOLOGY AND  [address] [place], [place] [phone]  



 [facility] MEDICINE      



CBC hemogram (2018  3:25 AM CDT)Only the most recent of3 resultswithin 
the time period is included.





 Component  Value  Ref Range  Performed At

 

 WBC  5.93Comment: WBC was  4.50 - 11.00 k/uL  [facility] DEPARTMENT OF



   corrected for NRBCs    PATHOLOGY AND GENOMIC



       MEDICINE

 

 RBC  2.58 (L)  4.20 - 5.50 m/uL  [facility] DEPARTMENT OF



       PATHOLOGY AND GENOMIC



       MEDICINE

 

 HGB  8.0 (L)  12.0 - 16.0 g/dL  [facility] DEPARTMENT OF



       PATHOLOGY AND GENOMIC



       MEDICINE

 

 HCT  24.7 (L)  37.0 - 47.0 %  [facility] DEPARTMENT OF



       PATHOLOGY AND GENOMIC



       MEDICINE

 

 MCV  95.7  82.0 - 100.0 fL  [facility] DEPARTMENT OF



       PATHOLOGY AND GENOMIC



       MEDICINE

 

 MCH  31.0  27.0 - 34.0 pg  [facility] DEPARTMENT OF



       PATHOLOGY AND GENOMIC



       MEDICINE

 

 MCHC  32.4  31.0 - 37.0 g/dL  [facility] DEPARTMENT OF



       PATHOLOGY AND GENOMIC



       MEDICINE

 

 RDW - SD  60.1 (H)  37.0 - 55.0 fL  [facility] DEPARTMENT OF



       PATHOLOGY AND GENOMIC



       MEDICINE

 

 MPV  11.1  8.8 - 13.2 fL  [facility] DEPARTMENT OF



       PATHOLOGY AND GENOMIC



       MEDICINE

 

 Platelet count  147 (L)  150 - 400 k/uL  [facility] DEPARTMENT OF



       PATHOLOGY AND GENOMIC



       MEDICINE

 

 Nucleated RBC  1.00  /100 WBC  [facility] DEPARTMENT OF



       PATHOLOGY AND GENOMIC



       MEDICINE









 Performing Organization  Address  City/[facility]  Phone Number

 

 [facility] DEPARTMENT OF PATHOLOGY AND  [address] [place], [place] [phone]  



 GENOMIC MEDICINE      



Vancomycin level, trough (2018  9:26 AM CDT)





 Component  Value  Ref Range  Performed At

 

 Vancomycin, trough  18.9  10.0 - 20.0 ug/mL  [facility] DEPARTMENT OF



   Comment:    PATHOLOGY AND GENOMIC



   Therapeutic Ranges:    MEDICINE



   Peak 30.0 - 40.0 ug/mL    



   Prcryu17.0 - 20.0 ug/mL    



       









 Specimen

 

 Serum









 Performing Organization  Address  City/[facility]  Phone Number

 

 [facility] DEPARTMENT OF PATHOLOGY AND  [address] [place] [phone]  



 [facility] MEDICINE      



ECG 12 lead (2018  3:34 PM CDT)Only the most recent of4 resultswithin the 
time period is included.





 Component  Value  Ref Range  Performed At

 

 Ventricular rate  79    [facility]

 

 Atrial rate  79    [facility]

 

 NC interval  126    [facility]

 

 QRSD interval  82    [facility]

 

 QT interval  500    [facility]

 

 QTC interval  573    [facility]

 

 P axis 1  20    [facility]

 

 QRS axis 1  34    [facility]

 

 T wave axis  25    [facility]

 

 EKG impression  Normal sinus rhythm-Possible Inferior infarct (cited on or 
before 2018)-Nonspecific T wave abnormality-Prolonged QT-Abnormal ECG-In 
automated comparison with ECG of 15-JUL-2018 14:40,-QT has dakota    [facility] MUSE



   thened-Electronically Signed By Toy Pedro MD ([id number])    



    on 2018 8:11:16 PM    



       









 Performing Organization  Address  City/State/[facility]  Phone Number

 

 [facility] MUSE  [address] [place] [phone]  



Troponin (2018  3:04 PM CDT)





 Component  Value  Ref Range  Performed At

 

 Troponin  <0.30  0.00 - 0.30 ng/mL  [facility] DEPARTMENT OF PATHOLOGY



   Comment:    AND GENOMIC MEDICINE



   0.30 - 1.49 ng/mlMay indicate increased risk of acute
    



    coronary syndrome.
    



   >=1.5 ng/mlConsistent with acute myocardial    



    infarction.    



   
    



   The diagnostic value of a single normal or non-diagnostic    



   result is questionable.Serial samples at 2-6 hour intervals    



   are required to rule out acute myocardial injury.    



       









 Specimen

 

 Plasma specimen









 Performing Organization  Address  City/[facility]  Phone Number

 

 [facility] DEPARTMENT OF PATHOLOGY AND  [address] [place] [address]  



 [facility] MEDICINE      



Vancomycin level, random (2018  5:07 PM CDT)





 Component  Value  Ref Range  Performed At

 

 Vancomycin, random  7.4  ug/mL  [facility] DEPARTMENT OF PATHOLOGY AND GENOMIC 
MEDICINE









 Specimen

 

 Serum









 Performing Organization  Address  City/[facility]  Phone Number

 

 [facility] DEPARTMENT OF PATHOLOGY AND  [address] [place] [address]  



 GENOMIC MEDICINE      



Ionized calcium (2018  5:12 AM CDT)Only the most recent of2 resultswithin 
the time period is included.





 Component  Value  Ref Range  Performed At

 

 pH  7.44    [facility] DEPARTMENT OF PATHOLOGY AND GENOMIC



       MEDICINE

 

 Ionized calcium  1.06 (L)  1.11 - 1.32 mmol/L  [facility] DEPARTMENT OF PATHOLOGY AND 
GENOMIC



       MEDICINE









 Specimen

 

 Plasma specimen









 Performing Organization  Address  City/[facility]  Phone Number

 

 [facility] DEPARTMENT OF PATHOLOGY AND  [address] [place] [address]  



 [facility]      



XR Picc Chest Portable (2018 11:18 AM CDT)





 Narrative  Performed At

 

 EXAMINATION:XR PICC CHEST PORTABLE



   [facility]



  



  



 CLINICAL HISTORY:M41.9 Scoliosisunspecified, Multiple IV meds



  



  



  



 COMPARISON:Chest x-ray 7/15/2018



  



  



  



 IMPRESSION: Single frontal view reveals interval placement of a  



 left-sided PICC line with tip overlying the SVC. The remainder of the  



 examination is unchanged.



  



  



  



  



  



  



  



 [facility]-[id number]



  



   









 Procedure Note

 

  Interface, Radiology Results Incoming - 2018 11:23 AM CDT



EXAMINATION:  XR PICC CHEST PORTABLE



 



 CLINICAL HISTORY:  M41.9 Scoliosis  unspecified, Multiple IV meds



 



 COMPARISON:  Chest x-ray 7/15/2018



 



 IMPRESSION: Single frontal view reveals interval placement of a left-sided 
PICC line with tip overlying the SVC. The remainder of the examination is 
unchanged.



 



 



 



 [facility]-[id number]









 Performing Organization  Address  City/[facility]  Phone Number

 

  [facility]  [address] [place] [address]  



PICC INSERTION (2018 10:34 AM CDT)





 Narrative  Performed At

 

 Justin Klein RN 2018 10:43 AM



  



 PICC insertion



  



 Date/Time: 2018 10:34 AM



  



 Performed by: CHARLOTTE IRBY



  



 Authorized by: PERRY PEREZ 



  



  



  



 Consent: 



  



 Consent obtained:Verbal



  



 Consent given by:Patient



  



 Risks discussed: arterial puncture, incorrect placement, nerve  



 damage, 



  



 infection, bleeding, deep vein thrombus and superficial thrombus



  



 Alternatives discussed:No treatment, alternative treatment,  



 delayed 



  



 treatment, observation and referral



  



 Universal protocol: 



  



 Procedure explained and questions answered to patient or proxy's 



  



 satisfaction: yes



  



 Relevant documents present and verified: yes



  



 Test results available and properly labeled: yes



  



 Imaging studies available: yes



  



 Required blood products, implants, devices, and special equipment 



  



 available: yes



  



 Site/side marked: yes



  



 Immediately prior to procedure, a time out was called: yes



  



 Patient identity confirmed:Verbally with patient, arm band and 



  



 hospital-assigned identification number



  



 Pre-procedure details: 



  



 Hand hygiene: Hand hygiene performed prior to insertion



  



 Sterile barrier technique: All elements of maximal sterile technique  



 



  



 followed



  



 Skin preparation:ChloraPrep



  



 Skin preparation agent: Skin preparation agent completely dried  



 prior to 



  



 procedure



  



 Anesthesia (see MAR for exact dosages): 



  



 Anesthesia method:Local infiltration



  



 Local anesthetic:Lidocaine 1% w/o epi



  



 PICC Line Placement Details (Will create an LDA): 



  



 Patient position:Flat



  



 Vessel Size (mm):3



  



 Indication:Known long term IV therapy



  



 Location:Left basilic



  



 Device Type:Non-valved



  



 Catheter size:5 Fr



  



 PICC Characteristics: 



  



 Catheter Brand:LFS ([employer])o picc



  



 External Catheter Length (cm):0



  



 Internal Catheter Length (cm):44



  



 Total Catheter Length (cm):44



  



 Catheter Lot Number:[id number]



  



 Catheter Expiration Date:1920



  



 Procedure Details: 



  



 Landmarks identified: yes



  



 Ultrasound guidance: yes



  



 Sterile ultrasound techniques: Sterile gel and sterile probe covers  



 were 



  



 used



  



 Number of attempts:1



  



 Number of PICC kits used during procedure:1



  



 Purpose of procedure:PICC Placement



  



 Successful PICC Placement: Yes



  



 Patency/Placement:Flushes without difficulty, flushed with 10 mL  



 



  



 normal saline, x-ray placement verified, injection cap placed and  



 positive 



  



 blood return



  



 PICC placed utlizing ultrasound-guided Modified Seldinger Technique:  



 Yes 



  



  



  



 Dressing/Securement:Antimicrobial dressing dry and intact,  



 catheter 



  



 securement device and antimicrobial dressing applied



  



 Blood Loss Amount:Less than 20 mL



  



 Post-Procedure Details: 



  



 Post-procedure:Dressing applied



  



 Tip placement confirmed by chest x-ray: Yes



  



 Patient tolerance of procedure:Tolerated well, no immediate 



  



 complications  



Intraoperative monitoring (2018  7:24 AM CDT)





 Narrative  Performed At

 

 This result has an attachment that is not available.



  



Prepare RBC, 1 Units (2018  6:40 AM CDT)Only the most recent of3 
resultswithin the time period is included.





 Component  Value  Ref Range  Performed At

 

 Product name  Apheresis Red Cell AS3 #1 LR    [facility] DEPARTMENT OF



       PATHOLOGY AND GENOMIC



       MEDICINE

 

 Unit number  [id number]    [facility] DEPARTMENT OF



       PATHOLOGY AND GENOMIC



       MEDICINE

 

 Product code  [id number]    [facility] DEPARTMENT OF



       PATHOLOGY AND GENOMIC



       MEDICINE

 

 Dispense status  Transfused    [facility] DEPARTMENT OF



       PATHOLOGY AND GENOMIC



       MEDICINE

 

 Blood expiration date  [id number]    [facility] DEPARTMENT OF



       PATHOLOGY AND GENOMIC



       MEDICINE

 

 Blood type code  5100    [facility] DEPARTMENT OF



       PATHOLOGY AND GENOMIC



       MEDICINE

 

 Blood type  O POSITIVE    [facility] DEPARTMENT OF



       PATHOLOGY AND GENOMIC



       MEDICINE









 Performing Organization  Address  City/[facility]/Zipcode  Phone Number

 

 [facility] DEPARTMENT OF PATHOLOGY AND  [address] [place] [address]  



 GENOMIC MEDICINE      



Type and screen (2018  6:40 AM CDT)Only the most recent of3 resultswithin 
the time period is included.





 Component  Value  Ref Range  Performed At

 

 ABO grouping  O    [facility] DEPARTMENT OF PATHOLOGY AND GENOMIC



       MEDICINE

 

 Rh type  POS    [facility] DEPARTMENT OF PATHOLOGY AND GENOMIC



       MEDICINE

 

 Antibody screen (gel)  NEG    [facility] DEPARTMENT OF PATHOLOGY AND GENOMIC



       MEDICINE









 Specimen

 

 Blood









 Performing Organization  Address  City/[facility]  Phone Number

 

 [facility] DEPARTMENT OF PATHOLOGY AND  [address] [place], [place] [id number]  



 GENOMIC MEDICINE      



US Hepatic (07/15/2018  4:00 PM CDT)





 Narrative  Performed At

 

 EXAMINATION:US HEPATIC



   [facility]



  



  



 CLINICAL HISTORY:Transaminitis



  



  



  



 COMPARISON:CT chest with contrast from 7/15/2018



  



  



  



 IMPRESSION:



  



  



  



 Liver: The liver is diffusely echogenic suggesting underlying fatty  



 infiltration. No intrahepatic biliary duct dilatation. No hepatic masses  



 are seen, however, evaluation is limited due to the diffuse increased  



 echogenicity. Liver is also noted to be 



  



 enlarged measuring 19 cm in greatest craniocaudal dimension.



  



  



  



 Portal vein: The portal vein is normal in size and demonstrates normal  



 hepatopedal flow. The diameter of the portal vein measures 1.1 cm.



  



  



  



 Gallbladder: The gallbladder is not visualized. Based on prior CT chest,  



 the patient is status post cholecystectomy.



  



  



  



 Common bile duct: 0.9 cm. This is likely within normal limits given  



 patient's history of cholecystectomy.



  



  



  



 No ascites.



  



  



  



  



  



 [id number]



  



   









 Procedure Note

 

 Hm Interface, Radiology Results Incoming - 07/15/2018  4:27 PM CDT



EXAMINATION:  US HEPATIC



 



 CLINICAL HISTORY:  Transaminitis



 



 COMPARISON:  CT chest with contrast from 7/15/2018



 



 IMPRESSION:



 



 Liver: The liver is diffusely echogenic suggesting underlying fatty 
infiltration. No intrahepatic biliary duct dilatation. No hepatic masses are 
seen, however, evaluation is limited due to the diffuse increased



 echogenicity. Liver is also noted to be



 enlarged measuring 19 cm in greatest craniocaudal dimension.



 



 Portal vein: The portal vein is normal in size and demonstrates normal 
hepatopedal flow. The diameter of the portal vein measures 1.1 cm.



 



 Gallbladder: The gallbladder is not visualized. Based on prior CT chest, the 
patient is status post cholecystectomy.



 



 Common bile duct: 0.9 cm. This is likely within normal limits given patient's 
history of cholecystectomy.



 



 No ascites.



 



 



 [facility]-[id number]









 Performing Organization  Address  City/[facility]  Phone Number

 

 [facility]  [address] [place] [address]  



XR Abdomen 1 Vw Portable (07/15/2018 12:38 PM CDT)Only the most recent of2 
resultswithin the time period is included.





 Narrative  Performed At

 

 EXAMINATION:XR ABDOMEN 1 VW PORTABLE



   [facility]



  



  



 CLINICAL HISTORY:Vomiting



  



  



  



 COMPARISON:None.



  



  



  



 FINDINGS:



  



  



  



 Extensive postoperative changes in the spine and hips.



  



 Stimulator device overlies the right lower quadrant. A catheter overlies  



 the left flank.



  



 There is gas in the colon. No disproportionate dilatation of small bowel  



 loops is seen.



  



  



  



 IMPRESSION:



  



 As above



  



  



  



 [facility]-[id number]



  



   









 Procedure Note

 

 Hm Interface, Radiology Results Incoming - 07/15/2018 12:43 PM CDT



EXAMINATION:  XR ABDOMEN 1 VW PORTABLE



 



 CLINICAL HISTORY:  Vomiting



 



 COMPARISON:  None.



 



 FINDINGS:



 



 Extensive postoperative changes in the spine and hips.



 Stimulator device overlies the right lower quadrant. A catheter overlies the 
left flank.



 There is gas in the colon. No disproportionate dilatation of small bowel loops 
is seen.



 



 IMPRESSION:



 As above



 



 [facility]-[id number]









 Performing Organization  Address  City/[facility] [place]  Phone Number

 

 [facility]  [address] [place] [address]  



Arterial blood gas (07/15/2018 10:20 AM CDT)Only the most recent of7 
resultswithin the time period is included.





 Component  Value  Ref Range  Performed At

 

 pH, arterial  7.51 (H)  7.35 - 7.45  [facility] DEPARTMENT OF PATHOLOGY AND



       GENOMIC MEDICINE

 

 pCO2, arterial  31 (L)  35 - 45 mmHg  [facility] DEPARTMENT OF PATHOLOGY AND



       GENOMIC MEDICINE

 

 pO2, arterial  163 (H)  80 - 90 mmHg  [facility] DEPARTMENT OF PATHOLOGY AND



       GENOMIC MEDICINE

 

 Bicarbonate, arterial  24.3  21.0 - 28.0 mmol/L  [facility] DEPARTMENT OF PATHOLOGY 
AND



       GENOMIC MEDICINE

 

 Base excess, arterial  2  -2 - 2 mEq/L  [facility] DEPARTMENT OF PATHOLOGY AND



       GENOMIC MEDICINE

 

 O2 saturation, arterial  100  95 - 100 %  [facility] DEPARTMENT OF PATHOLOGY AND



       GENOMIC MEDICINE









 Specimen

 

 Blood









 Performing Organization  Address  City/State/Zipcode  Phone Number

 

 [facility] DEPARTMENT OF PATHOLOGY AND  [address] [place] [phone]  



 GENOMIC MEDICINE      



Us duplex venous lower extremity (07/15/2018  8:28 AM CDT)





 Narrative  Performed At

 

  



  [facility]



 Vascular Ultrasound Laboratory



  



 Lower Extremity Venous Report



  



  [address] [facility] [facility] [address], [place] [id number]



  



  



  



 Pat.Name:GILDARDO RYAN.ID:[id number]  



  



  



 .Date: 7/15/2018 Refer.MD:PERRY PEREZ MD



  



 Exam Time: 8:02:00 AMStudy Type:LE  



 Venous 



  



 Height:67inWeight:  



 259lb 



  



 BSA: 2.26 m2  



 DOBAge:1961,57Y



  



 Sex: FEMALESonogrphr:  



 TOO Jasmine, JAVIER



  



 Pat. Stat.:Inpatient  



 Room:[place] [address] 



  



 TapeVol: SB, CPT - 4:  



 [address] 



  



 Echo Event ID:[id number]



  



 Order ID:[id number]



  



 Reason for Study:Tachypnea, evaluate for DVT. S/p stage II L2-3, L4-5



  



 lateral interbody fusion, T4-pelvis posterior fusion with



  



 instrumentation



  



 Procedures:Colorflow, Grayscale/2D, Pulsed wave Doppler



  



 Race:C 



  



 ------------------------------------



  



 SUMMARY:



  



 ------------------------------------



  



 DUPLEX SCAN OBSERVATIONS



  



  



  



  Deep VeinsSuperficial Veins



  



 RightLeft RightLeft



  



  GSV (prox) NormalNormal



  



  CFV Normal Normal (above knee)



  



  Femoral Normal Normal GSV (dist) Not Visualized Not Visualized



  



  Profunda Normal Normal (below knee)



  



  Popliteal Normal Normal



  



  PT (prox) Normal NormalSSV Not Visualized Not Visualized



  



  PT (dist) Normal Normal 



  



  Peroneal Normal Normal 



  



  



  



  RIGHT:There is normal compressibility with no evidence of echogenic



  



 material noted within the lumen of the visualized veins. Colorflow and



  



 Doppler signals are normal.



  



  



  



  LEFT: There is normal compressibility with no evidence of echogenic



  



 material noted within the lumen of the visualized veins. Colorflow and



  



 Doppler signals are normal. 



  



  



  



  PRELIMINARY FINDINGS



  



  1. No evidence of venous thrombosis in the above visualized veins. 



  



  



  



  PHYSICIAN INTERPRETATION 



  



  Venous examination of the both lower extremities demonstrated no



  



 evidence of venous thrombosis in the visualized veins.Normal



  



 compressibility and augmentation of all veins visualized.



  



  



  



 Signed 07/15/2018 01:28 PM



  



 Roger Jacinto MD, RPVI  









 Procedure Note

 

 Interface, Radiology Results In - 07/15/2018  1:28 PM CDT







                     Vascular Ultrasound Laboratory



                     Lower Extremity Venous Report



            [address] [place] [address]



 



 Pat.Name:  GILDARDO RYAN          Pat.ID:    [id number]



 .Date:   7/15/2018               Refer.MD:  PERRY PEREZ MD



 Exam Time: 8:02:00 AM              Study Type:LE Venous



 Height:    67in                    Weight:    259lb



 BSA:       2.26 m2                   Age:  1961,57Y



 Sex:       FEMALE                  Sonogrphr: TOO Jasmine RCS



 Pat. Stat.:Inpatient               Room:      [place] [address]



 Tape  Vol: SB,                     CPT - 4:   [address]



 Echo Event ID:[id number]



 Order ID:  [id number]



 Reason for Study:Tachypnea, evaluate for DVT. S/p stage II L2-3, L4-5



 lateral interbody fusion, T4-pelvis posterior fusion with



 instrumentation



 Procedures:Colorflow, Grayscale/2D, Pulsed wave Doppler



 Race:      C



 ------------------------------------



 SUMMARY:



 ------------------------------------



 DUPLEX SCAN OBSERVATIONS



 



    Deep Veins  Superficial Veins



   Right  Left Right  Left



      GSV (prox) Normal  Normal



  CFV Normal Normal (above knee)



  Femoral Normal Normal GSV (dist) Not Visualized Not Visualized



  Profunda Normal Normal (below knee)



  Popliteal Normal Normal



  PT (prox) Normal Normal  SSV Not Visualized Not Visualized



  PT (dist) Normal Normal



  Peroneal Normal Normal



 



  RIGHT:  There is normal compressibility with no evidence of echogenic



 material noted within the lumen of the visualized veins. Colorflow and



 Doppler signals are normal.



 



  LEFT: There is normal compressibility with no evidence of echogenic



 material noted within the lumen of the visualized veins. Colorflow and



 Doppler signals are normal.



 



  PRELIMINARY FINDINGS



  1. No evidence of venous thrombosis in the above visualized veins.



 



  PHYSICIAN INTERPRETATION



  Venous examination of the both lower extremities demonstrated no



 evidence of venous thrombosis in the visualized veins.  Normal



 compressibility and augmentation of all veins visualized.



 



 Signed 07/15/2018 01:28 PM



 Roger Jacinto MD, RPVI









 Performing Organization  Address  City/State/Zipcode  Phone Number

 

  [facility]  [address] [place] [address]  



CT Angiogram Pe Chest (07/15/2018  4:52 AM CDT)





 Narrative  Performed At

 

 EXAMINATION:CT ANGIOGRAM PE CHEST



   [facility]



  



  



 CLINICAL HISTORY: PE suspectedhigh pretest prob



  



  



  



 TECHNIQUE:CT angiographic images of the chest were obtained during  



 intravenous administration of iodinated contrast. Computerized  



 reformatted images and 3-D MIP images were also obtained and archived  



 (CT pulmonary embolus protocol).CT scans are 



  



 performed using radiation dose reduction techniques.Technical  



 factors are evaluated and adjusted to ensure appropriate moderation of  



 exposure.Automated dose management technology is applied to adjust  



 radiation exposure while achieving a diagnostic 



  



 quality image.



  



  



  



  



  



 COMPARISON:None.



  



  



  



 Findings:



  



  



  



 Suboptimal bolus limits evaluation for pulmonary embolus. Evaluation for  



 subsegmental branches of the pulmonary artery are nondiagnostic. No  



 central embolus is seen.



  



  



  



 No dissection or aneurysm is seen.



  



  



  



 Right lower lobe basilar dependent atelectasis is seen.



  



  



  



 No pulmonary mass or nodule is seen.



  



  



  



 No mediastinal hematoma or lymphadenopathy is seen.



  



  



  



 Visualized upper abdomen shows no acute abnormality. Cholecystectomy  



 clips are noted.



  



  



  



 Streak artifact from the bilateral pedicular screws are noted in the  



 thoracic spine.



  



  



  



  



  



 IMPRESSION: 



  



 1. Suboptimal bolus limits evaluation for pulmonary embolus. Evaluation  



 for subsegmental branches of the pulmonary artery are nondiagnostic. No  



 central embolus is seen.



  



 2. Right lower lobe basilar dependent atelectasis.



  



 3. Otherwise no acute abnormality identified in the chest.



  



  



  



 [facility]-[id number]



  



   









 Procedure Note

 

  Interface, Radiology Results Incoming - 07/15/2018  5:03 AM CDT



EXAMINATION:  CT ANGIOGRAM PE CHEST



 



 CLINICAL HISTORY: PE suspected  high pretest prob



 



 TECHNIQUE:  CT angiographic images of the chest were obtained during 
intravenous administration of iodinated contrast. Computerized reformatted 
images and 3-D MIP images were also obtained and archived (CT pulmonary embolus 
protocol).  CT scans are



 performed using radiation dose reduction techniques.  Technical factors are 
evaluated and adjusted to ensure appropriate moderation of exposure.  Automated 
dose management technology is applied to adjust radiation



 exposure while achieving a diagnostic



 quality image.



 



 



 COMPARISON:  None.



 



 Findings:



 



 Suboptimal bolus limits evaluation for pulmonary embolus. Evaluation for 
subsegmental branches of the pulmonary artery are nondiagnostic. No central 
embolus is seen.



 



 No dissection or aneurysm is seen.



 



 Right lower lobe basilar dependent atelectasis is seen.



 



 No pulmonary mass or nodule is seen.



 



 No mediastinal hematoma or lymphadenopathy is seen.



 



 Visualized upper abdomen shows no acute abnormality. Cholecystectomy clips are 
noted.



 



 Streak artifact from the bilateral pedicular screws are noted in the thoracic 
spine.



 



 



 IMPRESSION:



 1. Suboptimal bolus limits evaluation for pulmonary embolus. Evaluation for 
subsegmental branches of the pulmonary artery are nondiagnostic. No central 
embolus is seen.



 2. Right lower lobe basilar dependent atelectasis.



 3. Otherwise no acute abnormality identified in the chest.



 



 [facility]-[id number]









 Performing Organization  Address  City/[facility]  Phone Number

 

 [address] [address]  



Blood culture, aerobic &amp; anaerobic (07/15/2018  2:00 AM CDT)Only the most 
recent of2 resultswithin the time period is included.





 Component  Value  Ref Range  Performed At

 

 Blood culture isolate  No growth after 5 days of incubation.    [facility] DEPARTMENT 
OF



   Comment:    PATHOLOGY AND GENOMIC



   Specimen Information    MEDICINE



   Specimen Source: Blood    



   Specimen Site: Antecubital Right    



       









 Specimen

 

 Blood









 Performing Organization  Address  City/[facility]  Phone Number

 

 [facility] DEPARTMENT OF PATHOLOGY AND  [address] [place] [phone]  



 GENOMIC MEDICINE      



Manual differential (07/15/2018 12:10 AM CDT)





 Component  Value  Ref Range  Performed At

 

 Manual differential  PERFORMED    [facility] DEPARTMENT OF PATHOLOGY AND



       GENOMIC MEDICINE

 

 Neutrophils  57.0  39.0 - 69.0 %  [facility] DEPARTMENT OF PATHOLOGY AND



       GENOMIC MEDICINE

 

 Lymphocytes  17.0 (L)  25.0 - 45.0 %  [facility] DEPARTMENT OF PATHOLOGY AND



       GENOMIC MEDICINE

 

 Monocytes  26.0 (H)  0.0 - 10.0 %  [facility] DEPARTMENT OF PATHOLOGY AND



       GENOMIC MEDICINE

 

 Eosinophils  0.0  0.0 - 5.0 %  [facility] DEPARTMENT OF PATHOLOGY AND



       GENOMIC MEDICINE

 

 Basophils  0.0  0.0 - 1.0 %  [facility] DEPARTMENT OF PATHOLOGY AND



       GENOMIC MEDICINE

 

 Metamyelocytes  0  %  [facility] DEPARTMENT OF PATHOLOGY AND



       GENOMIC MEDICINE

 

 Promyelocytes  0  %  [facility] DEPARTMENT OF PATHOLOGY AND



       GENOMIC MEDICINE

 

 Platelet slide review  Solitario slt decr    [facility] DEPARTMENT OF PATHOLOGY AND



       GENOMIC MEDICINE









 Performing Organization  Address  City/[facility]/Zipcode  Phone Number

 

 [facility] DEPARTMENT OF PATHOLOGY AND  [address] [place] [address]  



 GENOMIC MEDICINE      



Hemoglobin &amp; hematocrit (2018  4:00 PM CDT)





 Component  Value  Ref Range  Performed At

 

 HGB  6.9 (LL)  12.0 - 16.0 g/dL  [facility] DEPARTMENT OF PATHOLOGY



   Comment:    AND GENOMIC MEDICINE



   HGB results called to and read back by BRUNO TABARES/DEBORAH(name/location) at
    



   201816:18 (date/time) by PC.    



       

 

 HCT  20.4 (L)  37.0 - 47.0 %  [facility] DEPARTMENT OF PATHOLOGY



       AND GENOMIC MEDICINE









 Specimen

 

 Blood









 Performing Organization  Address  City/State/Zipcode  Phone Number

 

 [facility] DEPARTMENT OF PATHOLOGY AND  [address] [place] [address]  



 [facility]      



CT Head Wo Contrast (2018  9:08 PM CDT)





 Narrative  Performed At

 

 EXAMINATION:CT HEAD WO CONTRAST



   [facility]



  



  



 CT IMAGING WAS PERFORMED WITH ITERATIVE RECONSTRUCTION TECHNIQUE AND/OR  



 AUTOMATED EXPOSURE CONTROL TO REDUCE RADIATION DOSE.



  



  



  



 CLINICAL HISTORY:unable to move right arm



  



  



  



 COMPARISON:None.



  



  



  



  



  



 FINDINGS:



  



  



  



 1. There is no acute intracranial abnormality. Specifically there is  



 no intracranial hemorrhage, mass effect or acute infarction.



  



  



  



 2.There are minimal if any nonspecific cerebral white matter  



 microvascular changes. There is mild to moderate cerebral cortical  



 volume loss and mild cerebellar volume loss.



  



  



  



 3.There is a minimal atherosclerotic calcification in the distal  



 internal carotid arteries.



  



  



  



 4.There are small mucous fluid levels in the maxillary sinuses  



 bilaterally larger on the right side and in the sphenoid sinus  



 bilaterally greater on the left side. There is very mild mucosal  



 thickening/mucus in the ethmoid sinus. Mastoids are clear. 



  



 There is a 6 mm calcified wall cystic structure in the posterior  



 inferior aspect of the maxillary sinus on the right probably of a  



 dentigerous origin and of no significance.



  



  



  



  



  



 IMPRESSION:



  



  



  



 No acute abnormality demonstrated. MRI of the brain is suggested if  



 further evaluation is clinically indicated.



  



 [facility]-[id number]



  



   









 Procedure Note

 

  Interface, Radiology Results Incoming - 2018  9:15 PM CDT



EXAMINATION:  CT HEAD WO CONTRAST



 



 CT IMAGING WAS PERFORMED WITH ITERATIVE RECONSTRUCTION TECHNIQUE AND/OR 
AUTOMATED EXPOSURE CONTROL TO REDUCE RADIATION DOSE.



 



 CLINICAL HISTORY:    unable to move right arm



 



 COMPARISON:  None.



 



 



 FINDINGS:



 



 1.   There is no acute intracranial abnormality. Specifically there is no 
intracranial hemorrhage, mass effect or acute infarction.



 



 2.  There are minimal if any nonspecific cerebral white matter microvascular 
changes. There is mild to moderate cerebral cortical volume loss and mild 
cerebellar volume loss.



 



 3.  There is a minimal atherosclerotic calcification in the distal internal 
carotid arteries.



 



 4.  There are small mucous fluid levels in the maxillary sinuses bilaterally 
larger on the right side and in the sphenoid sinus bilaterally greater on the 
left side. There is very mild mucosal thickening/mucus in



 the ethmoid sinus. Mastoids are clear.



 There is a 6 mm calcified wall cystic structure in the posterior inferior 
aspect of the maxillary sinus on the right probably of a dentigerous origin and 
of no significance.



 



 



 IMPRESSION:



 



 No acute abnormality demonstrated. MRI of the brain is suggested if further 
evaluation is clinically indicated.



 [facility]-[id number]









 Performing Organization  Address  City/[facility]/Zipcode  Phone Number

 

 [place] [address]  



Sodium level, syringe (2018  5:05 PM CDT)Only the most recent of6 
resultswithin the time period is included.





 Component  Value  Ref Range  Performed At

 

 Sodium, syringe  135  135 - 148 mEq/L  [facility] DEPARTMENT OF PATHOLOGY AND GENOMIC



       MEDICINE









 Specimen

 

 Blood









 Performing Organization  Address  City/[facility]  Phone Number

 

 [facility] DEPARTMENT OF PATHOLOGY AND  [address] [place] [address]      



Potassium, syringe (2018  5:05 PM CDT)Only the most recent of6 
resultswithin the time period is included.





 Component  Value  Ref Range  Performed At

 

 Potassium, syringe  3.8  3.5 - 5.0 mEq/L  [facility] DEPARTMENT OF PATHOLOGY AND 
GENOMIC



       MEDICINE









 Specimen

 

 Blood









 Performing Organization  Address  City/[facility]  Phone Number

 

 [facility] DEPARTMENT OF PATHOLOGY AND  [address] [place] [address]      



Lactic acid, syringe (2018  5:05 PM CDT)Only the most recent of6 
resultswithin the time period is included.





 Component  Value  Ref Range  Performed At

 

 Lactic acid, syringe  1.3  0.5 - 2.2 mmol/L  [facility] DEPARTMENT OF PATHOLOGY AND 
GENOMIC



       MEDICINE









 Specimen

 

 Blood









 Performing Organization  Address  City/[facility]  Phone Number

 

 [facility] DEPARTMENT OF PATHOLOGY AND  [address] [place] [address]      



Ionized calcium, arterial (2018  5:05 PM CDT)Only the most recent of6 
resultswithin the time period is included.





 Component  Value  Ref Range  Performed At

 

 Ionized calcium,  0.93 (L)Comment:  1.11 - 1.32 mmol/L  [facility] DEPARTMENT OF



 arterial  Specimen is slightly    PATHOLOGY AND GENOMIC



   hemolyzed.  Interpret    MEDICINE



   results accordingly.    









 Specimen

 

 Blood









 Performing Organization  Address  City/[facility]  Phone Number

 

 [facility] DEPARTMENT OF PATHOLOGY AND  [address] [place] [address]      



Hemoglobin, syringe (2018  5:05 PM CDT)Only the most recent of6 
resultswithin the time period is included.





 Component  Value  Ref Range  Performed At

 

 Hemoglobin, syringe  9.2 (L)  12.0 - 16.0 g/dL  [facility] DEPARTMENT OF PATHOLOGY 
AND GENOMIC



       MEDICINE









 Specimen

 

 Blood









 Performing Organization  Address  City/[facility]  Phone Number

 

 [facility] DEPARTMENT OF PATHOLOGY AND  [address] [place] [address] MEDICINE      



Glucose level, syringe (2018  5:05 PM CDT)Only the most recent of6 
resultswithin the time period is included.





 Component  Value  Ref Range  Performed At

 

 Glucose, syringe  193 (H)  65 - 99 mg/dL  [facility] DEPARTMENT OF PATHOLOGY AND 
GENOMIC



       MEDICINE









 Specimen

 

 Blood









 Performing Organization  Address  City/State[place]  Phone Number

 

 [facility] DEPARTMENT OF PATHOLOGY AND  [address] [place] [address]      



Arterial blood gas, corrected (2018  5:05 PM CDT)Only the most recent of6 
resultswithin the time period is included.





 Component  Value  Ref Range  Performed At

 

 pH, arterial  7.35  7.35 - 7.45  [facility] DEPARTMENT OF PATHOLOGY AND GENOMIC



       MEDICINE

 

 pCO2, arterial  40  35 - 45 mmHg  [facility] DEPARTMENT OF PATHOLOGY AND GENOMIC



       MEDICINE

 

 pO2, arterial  189 (H)  80 - 90 mmHg  [facility] DEPARTMENT OF PATHOLOGY AND GENOMIC



       MEDICINE

 

 Temperature, Celsius  37.0  Degrees C  [facility] DEPARTMENT OF PATHOLOGY AND GENOMIC



       MEDICINE

 

 O2 saturation, arterial  100  95 - 100 %  [facility] DEPARTMENT OF PATHOLOGY AND 
GENOMIC



       MEDICINE

 

 pH, arterial corrected  7.35    [facility] DEPARTMENT OF PATHOLOGY AND GENOMIC



       MEDICINE

 

 pCO2, arterial corrected  40  mmHg  [facility] DEPARTMENT OF PATHOLOGY AND GENOMIC



       MEDICINE

 

 pO2, arterial corrected  189  mmHg  [facility] DEPARTMENT OF PATHOLOGY AND GENOMIC



       MEDICINE

 

 Base excess, arterial  -4 (L)  -2 - 2 mEq/L  [facility] DEPARTMENT OF PATHOLOGY AND 
GENOMIC



       MEDICINE









 Specimen

 

 Blood









 Performing Organization  Address  City/[facility]  Phone Number

 

 [facility] DEPARTMENT OF PATHOLOGY AND  [address] [place] [address] MEDICINE      



OR FL &gt; I Hour (2018  4:41 PM CDT)Only the most recent of3 
resultswithin the time period is included.





 Narrative  Performed At

 

 EXAMINATION:OR FL 1 HOUR



  [facility]



  



  



 C-arm fluoroscopy was requested in OR.



  



  



  



 Location: [place] [facility] OR room#19



  



 O-ARM



  



 Scans:4



  



 Procedure:L2-L3, L4-L5 LATERAL INTERBODY FUSION W/ INSTRUMENTATION;  



 T4-PELVIS POSTERIOR FUSION W/ INSTRUMENTATION



  



 Start:7:32pm



  



 End:4:41pm



  



 Fluoro Time:12.17sec



  



 Dose(mGy):157.22



  



 Tech:QTN/MA



  



 Date:18



  



  



  



 IMPRESSION:



  



  



  



 Separate operative report will be issued by the physician performing the  



 procedure.



  



  



  



  



  



  



  



 [id number]



  



   









 Procedure Note

 

  Interface, Radiology Results Incoming - 2018 10:11 PM CDT



EXAMINATION:  OR FL   1 HOUR



 



 C-arm fluoroscopy was requested in OR.



 



 Location: [place] [facility] OR room#19



 O-ARM



 Scans:4



 Procedure:L2-L3, L4-L5 LATERAL INTERBODY FUSION W/ INSTRUMENTATION; T4-PELVIS 
POSTERIOR FUSION W/ INSTRUMENTATION



 Start:7:32pm



 End:4:41pm



 Fluoro Time:12.17sec



 Dose(mGy):157.22



 Tech:QTN/MA



 Date:18



 



 IMPRESSION:



 



 Separate operative report will be issued by the physician performing the 
procedure.



 



 



 



 [id number]









 Performing Organization  Address  City/State/Zipcode  Phone Number

 

  [facility]  [address] [place] [address]  



XR Lumbar Spine 1 Vw (2018  4:31 PM CDT)





 Narrative  Performed At

 

 EXAMINATION: XR LUMBAR SPINE 1 VW



   [facility]



  



  



 CLINICAL HISTORY: Lumbar region back pain and radiculopathy



  



  



  



 COMPARISON:None



  



  



  



 Findings:



  



  



  



 There is posterior fusion from the upper sacral region into the thoracic  



 region. The upper extent is not seen on this exam. There is multilevel  



 pedicle screws connected by vertical rods with areas of laminectomy.  



 There is anterior fusion material at 



  



 L5-S1, L4-5 and L2-3. There are degenerative changes in the lumbar and  



 lower thoracic spine. There is increased lumbar lordosis. There are  



 multiple radiopaque wires and instruments.



  



  



  



 IMPRESSION:



  



  



  



 Lateral intraoperative radiograph of the lumbar spine, sacrum and lower  



 thoracic spine for localization during surgery.



  



  



  



  



  



  



  



  



  



  



  



  



  



  



  



 [id number]



  



   









 Procedure Note

 

  Interface, Radiology Results Incoming - 2018  4:40 PM CDT



EXAMINATION: XR LUMBAR SPINE 1 VW



 



 CLINICAL HISTORY: Lumbar region back pain and radiculopathy



 



 COMPARISON:  None



 



 Findings:



 



 There is posterior fusion from the upper sacral region into the thoracic 
region. The upper extent is not seen on this exam. There is multilevel pedicle 
screws connected by vertical rods with areas of laminectomy. There is anterior 
fusion material at



 L5-S1, L4-5 and L2-3. There are degenerative changes in the lumbar and lower 
thoracic spine. There is increased lumbar lordosis. There are multiple 
radiopaque wires and instruments.



 



 IMPRESSION:



 



 Lateral intraoperative radiograph of the lumbar spine, sacrum and lower 
thoracic spine for localization during surgery.



 



 



 



 



 



 



 



 [facility]-[id number]









 Performing Organization  Address  City/State/Zipcode  Phone Number

 

  [facility]  [address] [place] [address]  



XR Cervical Spine 1 Vw (2018  4:31 PM CDT)





 Narrative  Performed At

 

 EXAMINATION: [facility] CERV[facility] SPINE 1 VW



   [facility]



  



  



 CLINICAL HISTORY: Cervical region neck pain and radiculopathy



  



  



  



 COMPARISON:None



  



  



  



 Findings:



  



  



  



 Single AP intraoperative x-ray shows an endotracheal tube with the tip  



 at the level of the clavicles. There is a tube with a radiopaque linear  



 density just to the left of this region. There is a nonspecific  



 radiopaque density foreign body overlapping the



  



  right posterior medial T3 rib. There is posterior fusion from the  



 approximately the T4 level extending downwards. Overlying structures  



 obscure detail of the upper thoracic spine. The inferior extent is not  



 seen on this exam. There is anterior fusion 



  



 with plate and screws in the lower cervical region.



  



  



  



  



  



  



  



 IMPRESSION:



  



  



  



 AP intraoperative radiograph of the cervical and upper thoracic spine  



 for localization during surgery.



  



  



  



  



  



  



  



  



  



  



  



  



  



  



  



 [facility]-[id number]



  



   









 Procedure Note

 

  Interface, Radiology Results Incoming - 2018  4:38 PM CDT



EXAMINATION: XR CERVICAL SPINE 1 VW



 



 CLINICAL HISTORY: Cervical region neck pain and radiculopathy



 



 COMPARISON:  None



 



 Findings:



 



 Single AP intraoperative x-ray shows an endotracheal tube with the tip at the 
level of the clavicles. There is a tube with a radiopaque linear density just 
to the left of this region. There is a nonspecific radiopaque



 density foreign body overlapping the



  right posterior medial T3 rib. There is posterior fusion from the 
approximately the T4 level extending downwards. Overlying structures obscure 
detail of the upper thoracic spine. The inferior extent is not



 seen on this exam. There is anterior fusion



 with plate and screws in the lower cervical region.



 



 



 



 IMPRESSION:



 



 AP intraoperative radiograph of the cervical and upper thoracic spine for 
localization during surgery.



 



 



 



 



 



 



 



 [id number]









 Performing Organization  Address  City/State/Zipcode  Phone Number

 

 [facility]  [address] [place] [address]  



Surgical pathology request (2018  7:41 AM CDT)Only the most recent of2 
resultswithin the time period is included.





 Component  Value  Ref Range  Performed At

 

 Case number  [id number]    [facility] DEPARTMENT OF



       PATHOLOGY AND GENOMIC



       MEDICINE

 

 Surgical pathology report  See link below for PDF    [facility] DEPARTMENT OF



   Lab Report    PATHOLOGY AND GENOMIC



       MEDICINE

 

 Result status  This is Final Report to    [facility] DEPARTMENT OF



   [id number]    PATHOLOGY AND GENOMIC



       MEDICINE









 Performing Organization  Address  City/[facility]  Phone Number

 

 [facility] DEPARTMENT OF PATHOLOGY AND  [address] [place] [address]  



 GENOMIC [facility]      



Partial thromboplastin time, activated (2018  4:28 AM CDT)Only the most 
recent of3 resultswithin the time period is included.





 Component  Value  Ref Range  Performed At

 

 PTT  35.9  23.0 - 36.0 sec  [facility] DEPARTMENT OF PATHOLOGY



   Comment:    AND GENOMIC MEDICINE



   PTT therapeutic range for unfractionated heparin is    



   61.0-112.0 seconds which corresponds to Anti-Xa    



   0.3-0.7 U/ml.    



       









 Specimen

 

 Blood









 Performing Organization  Address  City/[facility]  Phone Number

 

 [facility] DEPARTMENT OF PATHOLOGY AND  [address] [place], [place] [id number]  



 GENOMIC MEDICINE      



Prothrombin time with INR (2018  4:28 AM CDT)Only the most recent of3 
resultswithin the time period is included.





 Component  Value  Ref Range  Performed At

 

 Prothrombin time  13.8  12.0 - 15.0 sec  [facility] DEPARTMENT OF



       PATHOLOGY AND GENOMIC



       MEDICINE

 

 INR  1.0    [facility] DEPARTMENT OF



   Comment:    PATHOLOGY AND GENOMIC



   The International Normalized Ratio (INR) is a therapeutic    MEDICINE



   monitoring tool for patients who are stable on oral    



   anticoagulant therapy. An INR of 2.0-3.0 is suggested for deep    



   vein thrombosis/pulmonary embolism.    



       









 Specimen

 

 Blood









 Performing Organization  Address  City/[facility]/Zipcode  Phone Number

 

 [facility] DEPARTMENT OF PATHOLOGY AND  [address] [place], [place] [phone]  



 GENOMIC MEDICINE      



Hemoglobin (2018  4:00 AM CDT)





 Component  Value  Ref Range  Performed At

 

 HGB  10.3 (L)  12.0 - 16.0 g/dL  [facility] DEPARTMENT OF PATHOLOGY AND GENOMIC 
MEDICINE









 Specimen

 

 Blood









 Performing Organization  Address  City/State/Zipcode  Phone Number

 

 [facility] DEPARTMENT OF PATHOLOGY AND  [address] [place] [address]  



 [facility] MEDICINE      



CT Thoracic Spine Wo Contrast (07/10/2018  5:00 PM CDT)





 Narrative  Performed At

 

 Study: CT THORACIC SPINE WO CONTRAST.



   [facility]



  



  



 HISTORY: surgical planning.



  



  



  



 COMPARISON:None.



  



  



  



 TECHNIQUE: Multiple axial CT images of the thoracic spine performed  



 without intravenous contrast. Sagittal and coronal reconstructions  



 performed.



  



  



  



 CT imaging was performed with iterative reconstruction technique and/or  



 automated exposure control to reduce radiation dose.



  



  



  



 FINDINGS:



  



  



  



 Mineralization is low normal. There is a curvature of the thoracic spine  



 left convex at the level of T4 and right convex at the level of T9. No  



 lateral listhesis. There is been prior posterior element surgery from T5  



 to T12. There is fusion of facet 



  



 joints bilaterally at these levels. No acute fracture or erosions. There  



 is generalized mild disc space loss. No obvious disc protrusion present.  



 There is no significant canal or foraminal stenosis within limitations  



 of CT. The tip of the epidural 



  



 electrode is seen posteriorly at the level of T11.



  



  



  



 Paravertebral soft tissues are unremarkable. There is some mild  



 atelectasis is of the dependent portions of the lungs. There is some  



 pleural calcification on the right.



  



  



  



 IMPRESSION:



  



  



  



 S-shaped scoliosis of the thoracic spine with some prior posterior  



 element surgery at mid to lower segments and fusion of the facet joints  



 bilaterally at these levels.



  



  



  



  



  



  



  



 [facility]-[id number]



  



   









 Procedure Note

 

  Interface, Radiology Results Incoming - 07/10/2018  5:44 PM CDT



Study: CT THORACIC SPINE WO CONTRAST.



 



 HISTORY: surgical planning.



 



 COMPARISON:None.



 



 TECHNIQUE: Multiple axial CT images of the thoracic spine performed without 
intravenous contrast. Sagittal and coronal reconstructions performed.



 



 CT imaging was performed with iterative reconstruction technique and/or 
automated exposure control to reduce radiation dose.



 



 FINDINGS:



 



 Mineralization is low normal. There is a curvature of the thoracic spine left 
convex at the level of T4 and right convex at the level of T9. No lateral 
listhesis. There is been prior posterior element surgery from T5 to T12. There 
is fusion of facet



 joints bilaterally at these levels. No acute fracture or erosions. There is 
generalized mild disc space loss. No obvious disc protrusion present. There is 
no significant canal or foraminal stenosis within limitations of CT. The tip of 
the epidural



 electrode is seen posteriorly at the level of T11.



 



 Paravertebral soft tissues are unremarkable. There is some mild atelectasis is 
of the dependent portions of the lungs. There is some pleural calcification on 
the right.



 



 IMPRESSION:



 



 S-shaped scoliosis of the thoracic spine with some prior posterior element 
surgery at mid to lower segments and fusion of the facet joints bilaterally at 
these levels.



 



 



 



 [facility]-[id number]









 Performing Organization  Address  City/State/Zipcode  Phone Number

 

  [facility]  [address] [place] [address]  



CT Lumbar Spine Wo Contrast (07/10/2018  4:59 PM CDT)





 Narrative  Performed At

 

 Study: CT LUMBAR SPINE WO CONTRAST.



   [facility]



  



  



 HISTORY: surgical planning.



  



  



  



 COMPARISON:None.



  



  



  



 TECHNIQUE: Multiple axial CT images of the lumbar spine performed  



 without intravenous contrast. Sagittal and coronal reconstructions  



 performed.



  



  



  



 CT imaging was performed with iterative reconstruction technique and/or  



 automated exposure control to reduce radiation dose.



  



  



  



 FINDINGS:



  



  



  



 Mineralization is low normal. There is a left convex curvature of the  



 lumbar spine centered at L3. No lateral listhesis. No anterior  



 subluxations. Vertebral body heights are within normal limits. There is  



 no acute fracture. There are some postoperative 



  



 changes of prior laminectomy at L1. The spinal canal electrode appears  



 more intrathecal in the lumbar spine when compared to the recent  



 thoracic spine. There are posterior changes of L5/S1 interbody fusion.  



 No erosions.



  



  



  



 Mild disc space loss at L1-2 without disc protrusion, canal stenosis,  



 foraminal stenosis.



  



 Severe disc space loss at L2-3 with a broad shallow disc protrusion. No  



 significant canal stenosis. Moderate bilateral foraminal stenosis facet  



 arthrosis.



  



 Severe disc space loss at L3-4 without obvious disc protrusion. No  



 significant canal stenosis. Facet arthrosis result in mild left and  



 moderate right foraminal stenosis.



  



 Moderate disc space loss at L4-5. Broad shallow disc bulge and mild  



 canal stenosis. Facet arthrosis present with moderate bilateral  



 foraminal stenosis.



  



 Postoperative changes at L5/S1 as described above. There is no canal  



 stenosis. Moderate left foraminal stenosis from facet arthrosis. Right  



 foramen is patent.



  



  



  



 Paravertebral soft tissues are unremarkable. No fluid collection. Small  



 foci of air in the retroperitoneum likely postoperative.



  



  



  



  



  



 IMPRESSION:



  



  



  



 Expected postoperative changes.



  



  



  



 Degenerative changes as above.



  



  



  



 [facility]-[id number]



  



   









 Procedure Note

 

  Interface, Radiology Results Incoming - 07/10/2018  5:52 PM CDT



Study: CT LUMBAR SPINE WO CONTRAST.



 



 HISTORY: surgical planning.



 



 COMPARISON:None.



 



 TECHNIQUE: Multiple axial CT images of the lumbar spine performed without 
intravenous contrast. Sagittal and coronal reconstructions performed.



 



 CT imaging was performed with iterative reconstruction technique and/or 
automated exposure control to reduce radiation dose.



 



 FINDINGS:



 



 Mineralization is low normal. There is a left convex curvature of the lumbar 
spine centered at L3. No lateral listhesis. No anterior subluxations. Vertebral 
body heights are within normal limits. There is no



 acute fracture. There are some postoperative



 changes of prior laminectomy at L1. The spinal canal electrode appears more 
intrathecal in the lumbar spine when compared to the recent thoracic spine. 
There are posterior changes of L5/S1 interbody fusion. No erosions.



 



 Mild disc space loss at L1-2 without disc protrusion, canal stenosis, 
foraminal stenosis.



 Severe disc space loss at L2-3 with a broad shallow disc protrusion. No 
significant canal stenosis. Moderate bilateral foraminal stenosis facet 
arthrosis.



 Severe disc space loss at L3-4 without obvious disc protrusion. No significant 
canal stenosis. Facet arthrosis result in mild left and moderate right 
foraminal stenosis.



 Moderate disc space loss at L4-5. Broad shallow disc bulge and mild canal 
stenosis. Facet arthrosis present with moderate bilateral foraminal stenosis.



 Postoperative changes at L5/S1 as described above. There is no canal stenosis. 
Moderate left foraminal stenosis from facet arthrosis. Right foramen is patent.



 



 Paravertebral soft tissues are unremarkable. No fluid collection. Small foci 
of air in the retroperitoneum likely postoperative.



 



 



 IMPRESSION:



 



 Expected postoperative changes.



 



 Degenerative changes as above.



 



 [facility]-[id number]









 Performing Organization  Address  City/[facility]/Zipcode  Phone Number

 

 [facility]  [address] [place] [address]  



Lactic acid level (07/10/2018  3:00 PM CDT)





 Component  Value  Ref Range  Performed At

 

 Lactic acid  3.9 (HH)  0.5 - 2.2 mmol/L  [facility] DEPARTMENT OF PATHOLOGY AND 
GENOMIC



       MEDICINE









 Specimen

 

 Blood









 Performing Organization  Address  City/[facility]/Zipcode  Phone Number

 

 [facility] DEPARTMENT OF PATHOLOGY AND  [address] [place] [address]  



 GENOMIC MEDICINE      



XR Chest 2 Vw (2018  5:24 PM CDT)





 Narrative  Performed At

 

 Examination: XR CHEST 2 VW



  [facility]



  



  



 Clinical history: Z01.818 Encounter for other preprocedural examination,  



 PREOP



  



  



  



 Comparison: None



  



  



  



 Impression:



  



  



  



  



  



 1. The heart and pulmonary vasculature are within normal limits.



  



 2. No infiltrate or effusion is demonstrated. Right middle lobe linear  



 atelectasis is likely chronic.



  



 3. There is no acute osseous pathology. Thoracolumbar scoliosis distorts  



 the mediastinal anatomy.



  



  



  



  



  



 CONCLUSION: NO RADIOGRAPHIC EVIDENCE OF ACUTE CARDIOPULMONARY  



 ABNORMALITY.



  



 



  



  



  



  



  



  



  



  



  



  



  



 [facility]-[id number]



  



   









 Procedure Note

 

 Hm Interface, Radiology Results Incoming - 2018  5:50 PM CDT



Examination: XR CHEST 2 VW



 



 Clinical history: Z01.818 Encounter for other preprocedural examination, PREOP



 



 Comparison: None



 



 Impression:



 



 



 1. The heart and pulmonary vasculature are within normal limits.



 2. No infiltrate or effusion is demonstrated. Right middle lobe linear 
atelectasis is likely chronic.



 3. There is no acute osseous pathology. Thoracolumbar scoliosis distorts the 
mediastinal anatomy.



 



 



 CONCLUSION: NO RADIOGRAPHIC EVIDENCE OF ACUTE CARDIOPULMONARY ABNORMALITY.



 



 



 



 



 



 



 [facility]-[id number]









 Performing Organization  Address  City/[facility]  Phone Number

 

  [facility]  [address] [place] [address]  



Vitamin D 25 hydroxy level (2018  4:21 PM CDT)





 Component  Value  Ref Range  Performed At

 

 Vitamin D, 25-hydroxy  42.7  30.0 - 150.0  [facility] DEPARTMENT OF



   Comment:  ng/mL  PATHOLOGY AND GENOMIC



   This assay reports the sum of 25-hydroxy vitamin D3 and 25-hydroxy vitamin  
  MEDICINE



   D2.    



   Reference range:    



   0-17 years:    



   Deficiency: less than 20ng/mL    



   Optimum level: greater than or equal to 20 ng/mL.    



   18 years and older:    



   Deficiency: less than 20ng/mL    



   Insufficiency: 20-29 ng/mL    



   Optimum Level: 30-80 ng/mL    



   The assay reportable range is 3.4155.9 ng/mL. Levels higher than 150    



   ng/mL may be associated with toxicity.    



   If toxicity is clinically suspected and the reported result is >155.9    



   ng/mL,contact lab for alternative methods to obtain a definitivelevel.  
  



   If separate quantitation of 25-hydroxy vitamin D3 and 25-hydroxy vitamin D2 
   



   is needed, please contact lab for alternative methods.    



       









 Specimen

 

 Blood









 Performing Organization  Address  City/[facility]/Zipcode  Phone Number

 

 [facility] DEPARTMENT OF PATHOLOGY AND  [address] [place] [address]  



 [facility]      



Parathyroid hormone (2018  4:21 PM CDT)





 Component  Value  Ref Range  Performed At

 

 PTH  55  15 - 65 pg/mL  [facility] DEPARTMENT OF PATHOLOGY AND GENOMIC MEDICINE









 Specimen

 

 Blood









 Performing Organization  Address  City/[facility]/Zipcode  Phone Number

 

 [facility] DEPARTMENT OF PATHOLOGY AND  [address] [place] [address]  



 [facility]      



Hepatic function panel (2018  4:21 PM CDT)





 Component  Value  Ref Range  Performed At

 

 Albumin  3.6  3.5 - 5.0 g/dL  [facility] DEPARTMENT OF



       PATHOLOGY AND GENOMIC



       MEDICINE

 

 Total bilirubin  <0.2  0.0 - 1.2 mg/dL  [facility] DEPARTMENT OF



       PATHOLOGY AND GENOMIC



       MEDICINE

 

 Bilirubin direct  <0.2  0.0 - 0.3 mg/dL  [facility] DEPARTMENT OF



       PATHOLOGY AND GENOMIC



       MEDICINE

 

 Alkaline phosphatase  91  35 - 104 U/L  [facility] DEPARTMENT OF



       PATHOLOGY AND GENOMIC



       MEDICINE

 

 Protein  7.8  6.3 - 8.3 g/dL  [facility] DEPARTMENT OF



   Comment:    PATHOLOGY AND GENOMIC



   [place] 4.6-7.0 g/dL    MEDICINE



   1 week 4.4-7.6 g/dL    



   7 months-1year5.1-7.3 g/dL    



   1-2 years5.6-7.5 g/dL    



   >3 years6.0-8.0 g/dL    



    6.3-8.3 g/dL    



       

 

 ALT  50  5 - 50 U/L  [facility] DEPARTMENT OF



       PATHOLOGY AND GENOMIC



       MEDICINE

 

 AST  31  10 - 35 U/L  [facility] DEPARTMENT OF



       PATHOLOGY AND GENOMIC



       MEDICINE









 Specimen

 

 Plasma specimen









 Performing Organization  Address  City/State/Zipcode  Phone Number

 

 [facility] DEPARTMENT OF PATHOLOGY AND  [address] [place] [address]  



 [facility]      



Bone Density Peripheral (2018  5:25 PM CDT)





 Narrative  Performed At

 

 EXAMINATION:BONE DENSITY PERIPHERAL



   [facility]



  



  



 CLINICAL HISTORY:M80.00XA Age-related osteoporosis with current  



 pathological fracture 



  



  



  



 COMPARISON:None.



  



  



  



 The results of this study expressed as bone mineral density (BMD) were  



 as follows:



  



  



  



  



  



 Left Forearm (Radius 33%):



  



 BMD: 0.847 g/cm2



  



 T-Score: -0.3



  



 Z-Score: 0.3 



  



 Percent change: No prior exam. %



  



  



  



  



  



 Impression:Normal BMD.



  



  



  



  



  



 [facility]-[id number]



  



  



  



 A copy of this scans including a report detailing these results will  



 follow.



  



  



  



 Notes: 



  



 *The world health organization (WHO) has classified the patient's  



 T-score as follows:



  



  



  



 Normal=T-score at or above -1.0 SD



  



 Osteopenia=T-score between -1.0 and -2.5 SD



  



 Osteoporosis=T-score at or below minus 2.5 SD 



  



  



  



 **For premenopausal women, men under the age 50 years, and children the  



 WHO classification does not apply. In these individuals please assess  



 bone mineral density with Z scores for each skeletal site examined. 



  



  



  



 Z scores above -2.0: Within expected range for age. 



  



 Z scores lower than -2.0:Low bone density for age.



  



   









 Procedure Note

 

  Interface, Radiology Results Incoming - 2018  5:55 PM CDT



EXAMINATION:  BONE DENSITY PERIPHERAL



 



 CLINICAL HISTORY:  M80.00XA Age-related osteoporosis with current pathological 
fracture



 



 COMPARISON:  None.



 



 The results of this study expressed as bone mineral density (BMD) were as 
follows:



 



 



 Left Forearm (Radius 33%):



 BMD: 0.847 g/cm2



 T-Score: -0.3



 Z-Score: 0.3



 Percent change: No prior exam. %



 



 



 Impression:  Normal BMD.



 



 



 [facility]-[id number]



 



 A copy of this scans including a report detailing these results will follow.



 



 Notes:



 *The world health organization (WHO) has classified the patient's T-score as 
follows:



 



 Normal=T-score at or above -1.0 SD



 Osteopenia=T-score between -1.0 and -2.5 SD



 Osteoporosis=T-score at or below minus 2.5 SD



 



 **For premenopausal women, men under the age 50 years, and children the WHO 
classification does not apply. In these individuals please assess bone mineral 
density with Z scores for each skeletal site examined.



 



 Z scores above -2.0: Within expected range for age.



 Z scores lower than -2.0:  Low bone density for age.









 Performing Organization  Address  City/State/Zipcode  Phone Number

 

 [facility]  [address] [place] [address]  



Bone Density (2018  5:25 PM CDT)





 Narrative  Performed At

 

 EXAMINATION:BONE DENSITY



   [facility]



  



  



 CLINICAL HISTORY:M80.00XA Age-related osteoporosis with current  



 pathological fracture 



  



  



  



 COMPARISON:None.



  



  



  



 The results of this study expressed as bone mineral density (BMD) were  



 as follows:



  



  



  



 AP spine (L1-L4)



  



 BMD: 1.462 g/cm2



  



 T-Score: 2.4



  



 Z-Score: 2.1



  



 Percent change: No prior exam.%



  



 ***Values are likely spuriously elevated secondary to multilevel  



 degenerative disc disease and reactive vertebral body osteosclerosis  



 along the concave margin of a levoscoliotic spine.



  



  



  



  



  



 Dual Femur (Total Mean):



  



 ***Limited by bilateral total arthroplasties.



  



  



  



  



  



 Impression:Limited examination secondary to degenerative discogenic  



 osteosclerosis of the AP lumbar spine and bilateral total hip  



 arthroplasties.



  



  



  



  



  



 Notes: 



  



 *The world health organization (WHO) has classified the patient's  



 T-score as follows:



  



  



  



 Normal=T-score at or above -1.0 SD



  



 Osteopenia=T-score between -1.0 and -2.5 SD



  



 Osteoporosis=T-score at or below minus 2.5 SD 



  



  



  



 **For premenopausal women, men under the age 50 years, and children the  



 WHO classification does not apply. In these individuals please assess  



 bone mineral density with Z scores for each skeletal site examined. 



  



  



  



 Z scores above -2.0: Within expected range for age. 



  



 Z scores lower than -2.0:Low bone density for age.



  



  



  



 ***The TBS is derived from the texture of the DEXA image and has been  



 shown to be related to bone microarchitecture and fracture risk. This  



 data provides information independent of BMD value; is used as a  



 complement to the data obtained from the DEXA 



  



 analysis and the clinical examination. The TBS can assist the healthcare  



 professional in assessment of fracture risk and in monitoring the effect  



 of treatments on patient over time.



  



  



  



 [facility]-[id number]



  



   









 Procedure Note

 

 [facility], Radiology Results Incoming - 2018  5:52 PM CDT



EXAMINATION:  BONE DENSITY



 



 CLINICAL HISTORY:  M80.00XA Age-related osteoporosis with current pathological 
fracture



 



 COMPARISON:  None.



 



 The results of this study expressed as bone mineral density (BMD) were as 
follows:



 



 AP spine (L1-L4)



 BMD: 1.462 g/cm2



 T-Score: 2.4



 Z-Score: 2.1



 Percent change: No prior exam.%



 ***Values are likely spuriously elevated secondary to multilevel degenerative 
disc disease and reactive vertebral body osteosclerosis along the concave 
margin of a levoscoliotic spine.



 



 



 Dual Femur (Total Mean):



 ***Limited by bilateral total arthroplasties.



 



 



 Impression:  Limited examination secondary to degenerative discogenic 
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



 



 



 Notes:



 *The world health organization (WHO) has classified the patient's T-score as 
follows:



 



 Normal=T-score at or above -1.0 SD



 Osteopenia=T-score between -1.0 and -2.5 SD



 Osteoporosis=T-score at or below minus 2.5 SD



 



 **For premenopausal women, men under the age 50 years, and children the WHO 
classification does not apply. In these individuals please assess bone mineral 
density with Z scores for each skeletal site examined.



 



 Z scores above -2.0: Within expected range for age.



 Z scores lower than -2.0:  Low bone density for age.



 



 ***The TBS is derived from the texture of the DEXA image and has been shown to 
be related to bone microarchitecture and fracture risk. This data provides 
information independent of BMD value; is used as a complement to



 the data obtained from the DEXA



 analysis and the clinical examination. The TBS can assist the healthcare 
professional in assessment of fracture risk and in monitoring the effect of 
treatments on patient over time.



 



 [facility]-[id number]









 Performing Organization  Address  City/State/Zipcode  Phone Number

 

 [facility]  [address] [place] [address]  



after 2018



Insurance







 Payer  Benefit Plan / Group  Subscriber ID  Type  Phone  Address

 

 [facility] OPEN ACCESS/NETWORK  xxxxxxxxxxx  O    









 Guarantor Name  Account Type  Relation to  Date of Birth  Phone  Billing



     Patient      Address

 

 Gildardo Ryan  Personal/Family  Self  1961  [phone]  [phone]







         (Home)  [place] [address]







Advance Directives

Patient has advance care planning documents on file. For more information, 
please contact:Nilesh Mark[address] [place] [address]

## 2019-03-13 NOTE — EDPHYS
Physician Documentation                                                                           

 [facility]                                                                

Name: Gita Shepherd                                                                                 

Age: 57 yrs                                                                                       

Sex: Female                                                                                       

: 1961                                                                                   

MRN: [id number]                                                                                   

Arrival Date: 2019                                                                          

Time: 16:16                                                                                       

Account#: [id number]                                                                            

Bed 23                                                                                            

Private MD: Arnulfo London                                                                          

ED Physician Kishore Perdue                                                                      

HPI:                                                                                              

                                                                                             

16:48 This 57 yrs old  Female presents to ER via Ambulatory with complaints of       mono 

      Vomiting, Constipation, Skin Sore(s).                                                       

16:48 The patient presents to the emergency department with nausea, vomiting. Onset: The      mono 

      symptoms/episode began/occurred 3 day(s) ago.                                               

                                                                                                  

Historical:                                                                                       

- Allergies:                                                                                      

16:31 PENICILLINS;                                                                            sg  

- PMHx:                                                                                           

16:31 Hypertension;                                                                           sg  

                                                                                                  

- Immunization history:: Adult Immunizations up to date.                                          

- Social history:: Smoking status: Patient/guardian denies using tobacco.                         

- Ebola Screening: : Patient negative for fever greater than or equal to 101.5 degrees            

  Fahrenheit, and additional compatible Ebola Virus Disease symptoms Patient denies               

  exposure to infectious person Patient denies travel to an Ebola-affected area in the            

  21 days before illness onset No symptoms or risks identified at this time.                      

                                                                                                  

                                                                                                  

ROS:                                                                                              

16:49 Constitutional: Negative for fever, chills, and weight loss, Eyes: Negative for injury, mono 

      pain, redness, and discharge, ENT: Negative for injury, pain, and discharge, Neck:          

      Negative for injury, pain, and swelling, Cardiovascular: Negative for chest pain,           

      palpitations, and edema, Respiratory: Negative for shortness of breath, cough,              

      wheezing, and pleuritic chest pain, Back: Negative for injury and pain, : Negative        

      for injury, bleeding, discharge, and swelling, MS/Extremity: Negative for injury and        

      deformity, Neuro: Negative for headache, weakness, numbness, tingling, and seizure,         

      Psych: Negative for depression, anxiety, suicide ideation, homicidal ideation, and          

      hallucinations, Allergy/Immunology: Negative for hives, rash, and allergies, Endocrine:     

      Negative for neck swelling, polydipsia, polyuria, polyphagia, and marked weight changes.    

16:49 Abdomen/GI: Positive for nausea, vomiting.                                                  

16:49 Skin: Positive for ecchymosis, erythema, swelling, of the right arm.                        

                                                                                                  

Exam:                                                                                             

16:49 Constitutional:  This is a well developed, well nourished patient who is awake, alert,  mono 

      and in no acute distress. Head/Face:  Normocephalic, atraumatic. Eyes:  Pupils equal        

      round and reactive to light, extra-ocular motions intact.  Lids and lashes normal.          

      Conjunctiva and sclera are non-icteric and not injected.  Cornea within normal limits.      

      Periorbital areas with no swelling, redness, or edema. ENT:  Nares patent. No nasal         

      discharge, no septal abnormalities noted.  Tympanic membranes are normal and external       

      auditory canals are clear.  Oropharynx with no redness, swelling, or masses, exudates,      

      or evidence of obstruction, uvula midline.  Mucous membranes moist. Neck:  Trachea          

      midline, no thyromegaly or masses palpated, and no cervical lymphadenopathy.  Supple,       

      full range of motion without nuchal rigidity, or vertebral point tenderness.  No            

      Meningismus. Chest/axilla:  Normal chest wall appearance and motion.  Nontender with no     

      deformity.  No lesions are appreciated. Cardiovascular:  Regular rate and rhythm with a     

      normal S1 and S2.  No gallops, murmurs, or rubs.  Normal PMI, no JVD.  No pulse             

      deficits. Respiratory:  Lungs have equal breath sounds bilaterally, clear to                

      auscultation and percussion.  No rales, rhonchi or wheezes noted.  No increased work of     

      breathing, no retractions or nasal flaring. Abdomen/GI:  Soft, non-tender, with normal      

      bowel sounds.  No distension or tympany.  No guarding or rebound.  No evidence of           

      tenderness throughout. Back:  No spinal tenderness.  No costovertebral tenderness.          

      Full range of motion. Female :  Normal external genitalia. MS/ Extremity:  Pulses         

      equal, no cyanosis.  Neurovascular intact.  Full, normal range of motion. Neuro:  Awake     

      and alert, GCS 15, oriented to person, place, time, and situation.  Cranial nerves          

      II-XII grossly intact.  Motor strength 5/5 in all extremities.  Sensory grossly intact.     

       Cerebellar exam normal.  Normal gait. Psych:  Awake, alert, with orientation to            

      person, place and time.  Behavior, mood, and affect are within normal limits.               

16:49 Skin: abscess, that is small, of the right arm, with fluctuance, with induration, with      

      surrounding cellulitis, that is mild, cellulitis.                                           

                                                                                                  

Vital Signs:                                                                                      

16:29  / 80; Pulse 82; Resp 16; Pulse Ox 99% on R/A; Pain 7/10;                         sg  

17:30 BP 90 / 61; Pulse 107; Resp 19; Pulse Ox 96% on R/A;                                    ca1 

18:11  / 62; Pulse 103; Resp 19; Pulse Ox 99% on R/A;                                   ca1 

18:47  / 69 Supine; Pulse 101;                                                          lt1 

18:47  / 61 Sitting; Pulse 108;                                                         lt1 

18:49  / 70 Standing; Pulse 116;                                                        lt1 

19:40  / 61; Pulse 94; Resp 19; Pulse Ox 100% on R/A;                                   ca1 

20:30  / 62; Pulse 96; Resp 19; Pulse Ox 100% on R/A;                                   ca1 

                                                                                                  

MDM:                                                                                              

16:24 Patient medically screened.                                                             [facility] 

16:50 Data reviewed: vital signs, nurses notes, lab test result(s), EKG, radiologic studies,  mono 

      plain films.                                                                                

                                                                                                  

                                                                                             

16:47 Order name: Basic Metabolic Panel; Complete Time: 17:43                                 [facility] 

                                                                                             

16:47 Order name: CBC with Diff                                                               [facility] 

                                                                                             

16:47 Order name: LFT's; Complete Time: 17:43                                                 [facility] 

                                                                                             

16:47 Order name: Magnesium; Complete Time: 17:43                                             [facility] 

                                                                                             

16:47 Order name: NT PRO-BNP; Complete Time: 17:43                                            [facility] 

                                                                                             

16:47 Order name: PT-INR; Complete Time: 17:43                                                [facility] 

                                                                                             

16:47 Order name: Troponin (emerg Dept Use Only); Complete Time: 17:43                        [facility] 

                                                                                             

16:47 Order name: Blood Culture Adult (2)                                                     [facility] 

                                                                                             

16:47 Order name: Type And Screen; Complete Time: 18:17                                       [facility] 

                                                                                             

16:47 Order name: Lipase; Complete Time: 17:43                                                [facility] 

                                                                                             

17:43 Order name: Procalcitonin; Complete Time: 18:17                                         [facility] 

                                                                                             

17:53 Order name: Manual Differential                                                         [facility]

                                                                                             

18:10 Order name: ABO/RH no charge; Complete Time: 18:17                                      [facility]

                                                                                             

18:18 Order name: AMMONIA                                                                     [facility] 

                                                                                             

16:47 Order name: XRAY Chest (1 view); Complete Time: 18:55                                   [facility] 

                                                                                             

18:38 Order name: Occult Blood--Ancillary                                                     bd  

                                                                                             

19:42 Order name: Urine Dipstick--Ancillary (enter results)                                   mw2 

                                                                                             

19:56 Order name: CBC with Automated Diff                                                     [facility]

                                                                                             

19:56 Order name: CBC with Automated Diff                                                     [facility]

                                                                                             

19:56 Order name: Comprehensive Metabolic Panel                                               [facility]

                                                                                             

19:56 Order name: Comprehensive Metabolic Panel                                               [facility]

                                                                                             

16:47 Order name: EKG; Complete Time: 16:48                                                   [facility] 

                                                                                             

16:47 Order name: Cardiac monitoring; Complete Time: 17:15                                    [facility] 

                                                                                             

16:47 Order name: EKG - Nurse/Tech; Complete Time: 17:15                                      [facility] 

                                                                                             

16:47 Order name: IV Saline Lock; Complete Time: 17:15                                        [facility] 

                                                                                             

16:47 Order name: Labs collected and sent; Complete Time: 17:15                               [facility] 

                                                                                             

16:47 Order name: O2 Per Protocol; Complete Time: 17:15                                       [facility] 

                                                                                             

16:47 Order name: O2 Sat Monitoring; Complete Time: 17:15                                     [facility] 

                                                                                             

18:20 Order name: Orthostatics; Complete Time: 18:39                                          [facility] 

                                                                                             

19:56 Order name: CONS Pharmacy Consult                                                       [facility]

                                                                                             

19:56 Order name: Heart Healthy                                                               [facility]

                                                                                                  

Administered Medications:                                                                         

16:55 Drug: NS 0.9% 1000 ml Route: IV; Rate: 125 ml/hr; Site: left antecubital;               ca1 

19:06 Follow up: IV Status: Infusion continued upon admission                                 ca1 

20:33 Follow up: IV Status: Infusion continued upon admission                                 ca1 

17:55 Drug: Potassium Effervescent Tablet 25 mEq Route: PO;                                   ca1 

19:07 Follow up: Response: No adverse reaction                                                ca1 

17:55 Drug: fentaNYL (PF) 25 mcg Route: IVP; Site: left antecubital;                          ca1 

19:07 Follow up: Response: No adverse reaction; Pain is unchanged, physician notified         ca1 

18:00 Drug: Pepcid 20 mg Route: IVP; Site: left antecubital;                                  ca1 

19:07 Follow up: Response: No adverse reaction                                                ca1 

18:30 Drug: fentaNYL (PF) 25 mcg Route: IVP; Site: left antecubital;                          ca1 

19:42 Follow up: Response: No adverse reaction; Pain is decreased                             ca1 

18:35 Drug: NS 0.9% 500 ml Route: IV; Rate: bolus; Site: left antecubital;                    ca1 

19:06 Follow up: Response: No adverse reaction; IV Status: Infusion continued upon admission  ca1 

18:40 Drug: ProTONIX 40 mg Route: IVP; Site: left antecubital;                                ca1 

19:41 Follow up: Response: No adverse reaction                                                ca1 

18:42 Drug: Cefepime 1 grams Route: IVPB; Rate: 200 ml/hr; Infused Over: 30 mins; Site: left  ca1 

      antecubital;                                                                                

19:41 Follow up: Response: No adverse reaction; IV Status: Completed infusion                 ca1 

19:40 Drug: vancoMYCIN 1 grams Route: IVPB; Infused Over: 2 hrs; Site: left antecubital;      ca1 

20:33 Follow up: IV Status: Infusion continued upon admission                                 ca1 

                                                                                                  

                                                                                                  

Disposition:                                                                                      

19 18:22 Hospitalization ordered by Deysi Flores for Inpatient Admission. Preliminary     

  diagnosis are Cellulitis and acute lymphangitis of other parts of limb,                         

  Anemia, unspecified, Gastrointestinal hemorrhage, unspecified - guaiac trace                    

  positive, Hypokalemia.                                                                          

- Bed requested for Telemetry/MedSurg (Inpatient).                                                

- Status is Inpatient Admission.                                                              ca1 

- Condition is Fair.                                                                              

- Problem is new.                                                                                 

- Symptoms have improved.                                                                         

UTI on Admission? Yes                                                                             

                                                                                                  

                                                                                                  

                                                                                                  

Signatures:                                                                                       

Dispatcher [employer]                           EDMS                                                 

Ramesh Miller RN RN sg Anderson, Corey, MD MD cha Garcia, Cindy, RN RN                                                      

Delia Calderón RN                        RN   ca1                                                  

                                                                                                  

Corrections: (The following items were deleted from the chart)                                    

18:38 18:22 Hospitalization Ordered by Deysi Flores MD for Inpatient Admission. Preliminary  [facility] 

      diagnosis is Cellulitis and acute lymphangitis of other parts of limb; Anemia,              

      unspecified. Bed requested for Telemetry/MedSurg (Inpatient). Status is Inpatient           

      Admission. Condition is Fair. Problem is new. Symptoms have improved. UTI on Admission?     

      Yes. [facility]                                                                                    

18:56 18:38 2019 18:22 Hospitalization Ordered by Deysi Flores MD for Inpatient        [facility] 

      Admission. Preliminary diagnosis is Cellulitis and acute lymphangitis of other parts of     

      limb; Anemia, unspecified; Gastrointestinal hemorrhage, unspecified - guaiac trace          

      positive. Bed requested for Telemetry/MedSurg (Inpatient). Status is Inpatient              

      Admission. Condition is Fair. Problem is new. Symptoms have improved. UTI on Admission?     

      Yes. [facility]                                                                                    

20:19 18:56 2019 18:22 Hospitalization Ordered by Deysi Flores MD for Inpatient          

      Admission. Preliminary diagnosis is Cellulitis and acute lymphangitis of other parts of     

      limb; Anemia, unspecified; Gastrointestinal hemorrhage, unspecified - guaiac trace          

      positive; Hypokalemia. Bed requested for Telemetry/MedSurg (Inpatient). Status is           

      Inpatient Admission. Condition is Fair. Problem is new. Symptoms have improved. UTI on      

      Admission? Yes. [facility]                                                                         

21:13 20:19 2019 18:22 Hospitalization Ordered by Deysi Flores MD for Inpatient        ca1 

      Admission. Preliminary diagnosis is Cellulitis and acute lymphangitis of other parts of     

      limb; Anemia, unspecified; Gastrointestinal hemorrhage, unspecified - guaiac trace          

      positive; Hypokalemia. Bed requested for Telemetry/MedSurg (Inpatient). Status is           

      Inpatient Admission. Condition is Fair. Problem is new. Symptoms have improved. UTI on      

      Admission? Yes.                                                                           

                                                                                                  

**************************************************************************************************

## 2019-03-13 NOTE — XMS REPORT
Continuity of Care Document

 Created on:2018



Patient:GILDARDO RYAN

Sex:Female

:1961

External Reference #:[id number]





Demographics







 Address  [address] [place] [id number]

 

 Phone  [phone]

 

 Preferred Language  Unknown

 

 Marital Status  Unknown

 

 Synagogue Affiliation  Unknown

 

 Race  Unknown

 

 Ethnic Group  Unknown









Author







 Organization  [facility]









Problems







 Problem  Status  Onset  Classification  Date  Comments  Source



     Date    Reported    



             



             



             

 

 BACK  Active           [facility]



     018        [facility]



             



             

 

 BACK / AQUA  Active          [facility]



     [address]        [facility]



             



             

 

 ARTHROPATHY OF LUMBAR  Active          [facility]



 FACET JOINTS    [address]        [place]



             



             



             

 

 ARTHROPATHY OF LUMBAR  Active          [facility]



 FACET JOINT    [address]        [place]



             



             



             

 

 Bipolar 1 disorder  Resolved    Problem  10/30/2017     [facility]



             



             

 

 Diabetes  Active    Problem  10/30/2017     [facility]



             



             

 

 Acid reflux  Active    Problem  10/30/2017    [facility]



             



             

 

 Hypercholesteremia  Active    Problem  10/30/2017     [facility]



             



             

 

 Asthma in adult  Active    Problem  10/30/2017    [facility]



             



             

 

 Hypertension  Active    Problem  10/30/2017     [facility]



             



             

 

 Arthritis  Resolved    Problem  10/30/2017     [facility]



             



             

 

 Migraines  Active    Problem  10/30/2017     [facility]



             



             

 

 Depression  Resolved    Problem  10/30/2017     [facility]



             



             

 

 Apnea, sleep  Resolved    Problem  10/30/2017     [facility]



             



             

 

 Bladder incontinence  Active    Problem  10/30/2017     [facility]



             



             







Medications







 Medication  Details  Route  Status  Patient  Ordering  Order  Source



         Instructions  Provider  Date  



               



               



               

 

 acetaminophen-10  1,000 mg, 100    Inactive      [facility]/27/  [facility]



 mg/mL  mL, Route: IV,            [facility]



 INTRAVENOUS  Drug form: INJ,            and



 solution  ONCE, Dosing            Spine



   Weight 112.727,            



   kg, Start date:            



   10/27/17            



   12:17:00 CDT,            



   Stop date:            



   10/27/17            



   12:17:00            



   CDTNotes: Infuse            



   over 15 minutes            



   Do not exceed            



   4gm/day of            



   acetaminophen            



   *** MEDICATION            



   WASTE ***            



   Product Size:            



   1000 mg Product            



   Wasted:  ___ mg            



               



               

 

 Lactated Ringers  1,000 mL, Rate:    No Longer      10/27/  



 1,000 mL  125 ml/hr,    Active        [facility]



   Infuse over: 8            and



   hr, Route: IV,            Spine



   Dosing Weight            



   112.784 kg,            



   Total Volume:            



   1,000, Start            



   date: 10/27/17            



   12:17:00 CDT,            



   Duration: 30            



   day, Stop date:            



   17            



   12:16:00 CST            



               



               

 

 ketOROLAC 15  15 mg, 0.5 mL,    Inactive      10/27/  [facility]



 mg/mL injectable  Route: IV, Drug            [facility]



 solution  form: INJ, ONCE,            and



   Dosing Weight            Spine



   112.727, kg,            



   Priority: NOW,            



   Start date:            



   10/27/17            



   12:17:00 CDT,            



   Stop date:            



   10/27/17            



   12:17:00            



   CDTNotes: (Same            



   as:Toradol) IV            



   bolus must be            



   given >15            



   seconds. Give IM            



   administration            



   slowly and            



   deeply into the            



   muscle.  Not for            



   use > 4 days            



   *** MEDICATION            



   WASTE ***            



   Product Size:            



   30 mg Product            



   Wasted:  ___ mg            



               



               

 

 hydromorphone  1 mg, 0.5 mL,    No Longer      10/27/  [facility]



   Route: IVP, Drug    Active        [facility]



   form: INJ, PRN,            and



   Dosing Weight            Spine



   112.727, kg, PRN            



   Pain Score 7-10,            



   Start date:            



   10/27/17            



   12:17:00 CDT,            



   Duration: 6            



   doses or times,            



   Stop date:            



   10/28/17 0:00:00            



   CDTNotes: (Same            



   as: Dilaudid)            



               



               

 

 promethazine  12.5 mg, 0.5 mL,    No Longer      10/27/  [facility]



   Route: IVPB,    Active        [facility]



   Drug form: INJ,            and



   Q4H, Dosing            Spine



   Weight 112.784,            



   kg, PRN Nausea &            



   Vomiting, Start            



   date: 10/27/17            



   12:17:00 CDT,            



   Duration: 30            



   day, Stop date:            



   17            



   12:16:00            



   CSTNotes: Do not            



   give IV push.            



   (Same as:            



   Phenergan)            



               

 

 ondansetron  4 mg, 2 mL,    No Longer      10/27/  [facility]



   Route: IVP, Drug    Active        [facility]



   form: INJ, ONCE,            and



   Dosing Weight            Spine



   112.727, kg, PRN            



   Nausea &            



   Vomiting, Start            



   date: 10/27/17            



   12:17:00            



   CDTNotes: (Same            



   as: Zofran)            



   *** MEDICATION            



   WASTE ***            



   Product Size:  4            



   mg Product            



   Wasted:  ___ mg            



               



               

 

 acetaminophen-hy  1 tab, Route:    No Longer      10/27/  [facility]



 drocodone 325  PO, Drug Form:    Active      2017  [facility]



 mg-10 mg oral  TAB, Dosing            and



 tablet  Weight 112.727,            Spine



   kg, Q4H, PRN            



   Pain Score 4-6,            



   Start date:            



   10/27/17            



   12:17:00 CDT,            



   Duration: 30            



   day, Stop date:            



   17            



   12:16:00            



   CSTNotes: Do not            



   exceed 4gm/day            



   of            



   acetaminophen.            



   (Same as: Norco            



   325/10)            



               

 

 Saline Flush  10 ml, Route:    No Longer      10/27/  [facility]



 0.9%  IVP, Drug Form:    Active        [facility]



   INJ, Dosing            and



   Weight 112.727,            Spine



   kg, PRN, PRN            



   Line Flush,            



   Start date:            



   10/27/17            



   10:42:00 CDT,            



   Duration: 30            



   day, Stop date:            



   17 9:41:00            



   CSTNotes:            



   preservative            



   free.            



               

 

 Lactated Ringers  1,000 mL, Rate:    Inactive      10/27/  [facility]



 1,000 mL  125 ml/hr,          2017  [facility]



   Infuse over: 8            and



   hr, Route: IV,            Spine



   Dosing Weight            



   112.727 kg,            



   Total Volume:            



   1,000, Start            



   date: 10/27/17            



   10:42:00 CDT,            



   Duration: 30            



   day, Stop date:            



   17            



   10:41:00 CST            



               



               

 

 Hydromorphone  0.5 mg, 0.25 mL,    No Longer      09/15/  [facility]



   Route: IVP, Drug    Active        [facility]



   form: INJ, PRN,            and



   Dosing Weight            Spine



   113.636, kg, PRN            



   Pain Score 4-6,            



   Start date:            



   09/15/17            



   11:27:00 CDT,            



   Duration: 6            



   doses or times,            



   Stop date:            



   17 0:00:00            



   CDTNotes: Same            



   as Dilaudid            



               



               

 

 Ondansetron  4 mg, 2 mL,    No Longer      09/15/  [facility]



   Route: IVP, Drug    Active        [facility]



   form: INJ, ONCE,            and



   Dosing Weight            Spine



   113.636, kg, PRN            



   Nausea &            



   Vomiting, Start            



   date: 09/15/17            



   11:27:00            



   CDTNotes: (Same            



   as: Zofran)            



   *** MEDICATION            



   WASTE ***            



   Product Size:  4            



   mg Product            



   Wasted:  ___ mg            



               



               

 

 Promethazine  12.5 mg, 0.5 mL,    No Longer      09/15/  [facility]



   Route: IVPB,    Active        [facility]



   Drug form: INJ,            and



   Q4H, Dosing            Spine



   Weight 113.636,            



   kg, PRN Nausea &            



   Vomiting, Start            



   date: 09/15/17            



   11:27:00 CDT,            



   Duration: 30            



   day, Stop date:            



   10/15/17            



   11:26:00            



   CDTNotes: Do not            



   give IV push.            



   (Same as:            



   Phenergan)            



               

 

 Lactated Ringers  1,000 mL, Rate:    No Longer      09/15/  [facility]



 1,000 mL  125 ml/hr,    Active        [facility]



   Infuse over: 8            and



   hr, Route: IV,            Spine



   Dosing Weight            



   113.636 kg,            



   Total Volume:            



   1,000, Start            



   date: 09/15/17            



   11:27:00 CDT,            



   Duration: 30            



   day, Stop date:            



   10/15/17            



   11:26:00 CDT            



               



               

 

 Acetaminophen  1 tab, Route:    No Longer      09/15/  [facility]



 325 MG /  PO, Drug Form:    Active        [facility]



 Hydrocodone  TAB, Dosing            and



 Bitartrate 10 MG  Weight 113.636,            Spine



 Oral Tablet  kg, Q4H, PRN            



   Pain Score 4-6,            



   Start date:            



   09/15/17            



   11:27:00 CDT,            



   Duration: 30            



   day, Stop date:            



   10/15/17            



   11:26:00            



   CDTNotes: Do not            



   exceed 4gm/day            



   of            



   acetaminophen.            



   (Same as: Norco            



   325/10)            



               

 

 Lactated Ringers  1,000 mL, Rate:    Inactive      09/15/  [facility]



 1,000 mL  125 ml/hr,          2017  [facility]



   Infuse over: 8            and



   hr, Route: IV,            Spine



   Dosing Weight            



   113.636 kg,            



   Total Volume:            



   1,000, Start            



   date: 09/15/17            



   10:04:00 CDT,            



   Duration: 30            



   day, Stop date:            



   10/15/17            



   10:03:00 CDT            



               



               

 

 Promethazine  12.5 mg, 0.5 mL,    No Longer      



   Route: IVPB,    Active        [facility]



   Drug form: INJ,            and



   Q4H, Dosing            Spine



   Weight 118.182,            



   kg, PRN Nausea &            



   Vomiting, Start            



   date: 17            



   9:46:00 CDT,            



   Duration: 30            



   day, Stop date:            



   17 9:45:00            



   CDTNotes: Do not            



   give IV push.            



   (Same as:            



   Phenergan)            



               

 

 Ondansetron  4 mg, 2 mL,    No Longer      



   Route: IVP, Drug    Active      2017  [facility]



   form: INJ, ONCE,            and



   Dosing Weight            Spine



   118.182, kg, PRN            



   Nausea &            



   Vomiting, Start            



   date: 17            



   9:46:00            



   CDTNotes: (Same            



   as: Zofran)            



   *** MEDICATION            



   WASTE ***            



   Product Size:  4            



   mg Product            



   Wasted:  ___ mg            



               



               

 

 Hydromorphone  1 mg, 0.5 mL,    No Longer      



   Route: IVP, Drug    Active        [facility]



   form: INJ, PRN,            and



   Dosing Weight            Spine



   118.182, kg, PRN            



   Pain Score 7-10,            



   Start date:            



   17 9:46:00            



   CDT, Duration:            



   30 day, Stop            



   date: 17            



   9:45:00            



   CDTNotes: Same            



   as Dilaudid            



               



               

 

 Acetaminophen  1 tab, Route:    No Longer      



 325 MG /  PO, Drug Form:    Active        [facility]



 Hydrocodone  TAB, Dosing            and



 Bitartrate 10 MG  Weight 118.182,            Spine



 Oral Tablet  kg, Q4H, PRN            



   Pain Score 4-6,            



   Start date:            



   17 9:46:00            



   CDT, Duration:            



   30 day, Stop            



   date: 17            



   9:45:00            



   CDTNotes: Do not            



   exceed 4gm/day            



   of            



   acetaminophen.            



   (Same as: Norco            



   325/10)            



               

 

 Lactated Ringers  1,000 mL, Rate:    No Longer      



 1,000 mL  125 ml/hr,    Active        [facility]



   Infuse over: 8            and



   hr, Route: IV,            Spine



   Dosing Weight            



   118.182 kg,            



   Total Volume:            



   1,000, Start            



   date: 17            



   9:46:00 CDT,            



   Duration: 30            



   day, Stop date:            



   17 9:45:00            



   CDT            



               

 

 Lactated Ringers  1,000 mL, Rate:    Inactive      



 1,000 mL  125 ml/hr,          2017  [facility]



   Infuse over: 8            and



   hr, Route: IV,            Spine



   Dosing Weight            



   118.182 kg,            



   Total Volume:            



   1,000, Start            



   date: 17            



   8:53:00 CDT,            



   Duration: 30            



   day, Stop date:            



   17 8:52:00            



   CDT            



               

 

 Lactated Ringers  1,000 mL, Rate:    No Longer      



 1,000 mL  125 ml/hr,    Active        [facility]



   Infuse over: 8            and



   hr, Route: IV,            Spine



   Dosing Weight            



   118.636 kg,            



   Total Volume:            



   1,000, Start            



   date: 17            



   10:52:00 CDT,            



   Duration: 30            



   day, Stop date:            



   17            



   10:51:00 CDT            



               



               

 

 Promethazine  12.5 mg, 0.5 mL,    No Longer      



   Route: IVPB,    Active        [facility]



   Drug form: INJ,            and



   Q4H, Dosing            Spine



   Weight 118.636,            



   kg, PRN Nausea &            



   Vomiting, Start            



   date: 17            



   10:52:00 CDT,            



   Duration: 30            



   day, Stop date:            



   17            



   10:51:00            



   CDTNotes: Do not            



   give IV push.            



   (Same as:            



   Phenergan)            



               

 

 Ondansetron  4 mg, 2 mL,    No Longer      



   Route: IVP, Drug    Active        [facility]



   form: INJ, ONCE,            and



   Dosing Weight            Spine



   118.636, kg, PRN            



   Nausea &            



   Vomiting, Start            



   date: 17            



   10:52:00            



   CDTNotes: (Same            



   as: Zofran)            



   *** MEDICATION            



   WASTE ***            



   Product Size:  4            



   mg Product            



   Wasted:  ___ mg            



               



               

 

 Hydromorphone  1 mg, 0.5 mL,    No Longer      



   Route: IVP, Drug    Active        [facility]



   form: INJ, PRN,            and



   Dosing Weight            Spine



   118.636, kg, PRN            



   Pain Score 7-10,            



   Start date:            



   17            



   10:52:00 CDT,            



   Duration: 3            



   doses or times,            



   Stop date:            



   17 0:00:00            



   CDTNotes: Same            



   as Dilaudid            



               



               

 

 Acetaminophen  1 tab, Route:    No Longer      



 325 MG /  PO, Drug Form:    Active      2017  [facility]



 Hydrocodone  TAB, Dosing            and



 Bitartrate 10 MG  Weight 118.636,            Spine



 Oral Tablet  kg, Q4H, PRN            



   Pain Score 4-6,            



   Start date:            



   17            



   10:52:00 CDT,            



   Duration: 30            



   day, Stop date:            



   17            



   10:51:00            



   CDTNotes: Do not            



   exceed 4gm/day            



   of            



   acetaminophen.            



   (Same as: Norco            



   325/10)            



               

 

 Ondansetron  4 mg, 2 mL,    No Longer      



   Route: IVP, Drug    Active      2017  [facility]



   form: INJ, ONCE,            and



   Dosing Weight            Spine



   118.636, kg, PRN            



   Nausea &            



   Vomiting, Start            



   date: 17            



   10:38:00            



   CDTNotes: (Same            



   as: Zofran)            



   *** MEDICATION            



   WASTE ***            



   Product Size:  4            



   mg Product            



   Wasted:  ___ mg            



               



               

 

 Naloxone  0.4 mg, 1 mL,    Inactive      



   Route: IVP, Drug            [facility]



   form: INJ,            and



   Q2MIN, Dosing            Spine



   Weight 118.636,            



   kg, PRN Narcotic            



   Reversal, Start            



   date: 17            



   10:38:00 CDT,            



   Duration: 8            



   doses or times,            



   Stop date:            



   17            



   18:37:00            



   CDTNotes: Same            



   as Narcan            



               

 

 Flumazenil  0.2 mg, 2 mL,    Inactive      



   Route: IVP, Drug            [facility]



   form: INJ, PRN,            and



   Dosing Weight            Spine



   118.636, kg, PRN            



   Benzodiazepine            



   Reversal,            



   Initial dose,            



   Start date:            



   17            



   10:38:00 CDT,            



   Duration: 8 hr,            



   Stop date:            



   17            



   18:37:00            



   CDTNotes: (Same            



   as: Romazicon)            



               



               

 

 Lactated Ringers  1,000 mL, Rate:    Inactive      



 1,000 mL  125 ml/hr,          2017  [facility]



   Infuse over: 8            and



   hr, Route: IV,            Spine



   Dosing Weight            



   118.636 kg,            



   Total Volume:            



   1,000, Start            



   date: 17            



   9:42:00 CDT,            



   Duration: 30            



   day, Stop date:            



   17 9:41:00            



   CDT            



               

 

 metoprolol 100  100 mg=1 tab,    Active      



 mg oral tablet,  PO, Daily, # 30          2017  [facility]



 extended release  tab, 0 Refill(s)            and



               Spine



               



               



               

 

 lisinopril 10 mg  10 mg=1 tab, PO,    Active        



 oral tablet  Daily, # 30 tab,          2017  [facility]



   0 Refill(s)            and



               Spine



               



               



               

 

 topiramate 50 MG  50 mg=1 tab, PO,    Active        



 Oral Tablet  BID, # 60 tab, 0          2017  [facility]



 [Topamax]  Refill(s)            and



               Spine



               



               



               

 

 Lurasidone  60 mg=1 tab, PO,    Active      07/06/  [facility]



 Hydrochloride 60  Daily, 0          2017  [facility]



 MG Oral Tablet  Refill(s)            and



 [Latuda]              Spine



               



               



               

 

 Deplin 15 mg  15 mg=1 cap, PO,    Active        



 oral capsule  Daily, 0            [facility]



   Refill(s)            and



               Spine



               



               



               

 

 Simvastatin 10  10 mg=1 tab, PO,    Active        



 MG Oral Tablet  Bedtime, # 30            [facility]



 [Zocor]  tab, 0 Refill(s)            and



               Spine



               



               



               

 

 Furosemide 20 MG  20 mg=1 tab, PO,    Active        



 Oral Tablet  Daily, # 30 tab,          2017  [facility]



 [Lasix]  0 Refill(s)            and



               Spine



               



               



               

 

 24 HR  100 mg=1 tab,    Active        



 Desvenlafaxine  PO, Daily, # 30            [facility]



 100 MG Extended  tab, 0 Refill(s)            and



 Release Tablet              Spine



 [Pristiq]              



               



               

 

 Metformin  500 mg=1 tab,    Active        



 hydrochloride  PO, BID-Meals, #            [facility]



 500 MG Oral  30 tab, 0            and



 Tablet  Refill(s)            Spine



               



               



               

 

 amitriptyline 75  75 mg=1 tab, PO,    Active        



 mg oral tablet  Bedtime, # 30            [facility]



   tab, 0 Refill(s)            and



               Spine



               



               



               







Allergies, Adverse Reactions, Alerts







 Substance  Category  Reaction  Severity  Reaction  Status  Date  Comments  
Source



         type    Reported    



                 



                 



                 

 

 penicillins  Assertion      Drug  Active    eyes  [facility]



 <sup>1</sup        allergy      swell,  [facility]



 >              rash  and



               hives  Spine



                 



                 



                 







Immunizations







 Immunization  Date Given  Site  Status  Last Updated  Comments  Source



             



             







Results







 Order  Results  Value  Reference  Date  Interpretation  Comments  Source



 Name      Range        



               



               







Vital Signs







 Vital Sign  Value  Date  Comments  Source



         

 

 Systolic (mm Hg)  102  10/27/2017     [facility]



         [facility]



         

 

 Diastolic (mm Hg)  61  10/27/2017     [facility]



         [facility]



         

 

 Respitory Rate  16  10/27/2017     [facility]



         [facility]



         

 

 BMI Calculated  40.15  10/27/2017     [facility]



         [facility]



         

 

 Weight  112.784  10/27/2017     [facility]



         [facility]



         

 

 Height  167.6 cm  10/27/2017     [facility]



         [facility]



         

 

 Respitory Rate  16  10/27/2017     [facility]



         [facility]



         

 

 Systolic (mm Hg)  100  10/27/2017     [facility]



         [facility]



         

 

 Diastolic (mm Hg)  60  10/27/2017     [facility]



         [facility]



         

 

 Temperature Oral (F)  97.6 F  10/27/2017     [facility]



         [facility]



         

 

 Respitory Rate  16  10/27/2017     [facility]



         [facility]



         

 

 Systolic (mm Hg)  104  10/27/2017    [facility]



         [facility]



         

 

 Diastolic (mm Hg)  59  10/27/2017     [facility]



         [facility]



         

 

 Temperature Oral (F)  98.2 F  10/27/2017    [facility]



         [facility]



         

 

 Systolic (mm Hg)  107  09/15/2017    [facility]



         [facility]



         

 

 Diastolic (mm Hg)  66  09/15/2017    [facility]



         [facility]



         

 

 Respitory Rate  16  09/15/2017    [facility]



         [facility]



         

 

 Respitory Rate  15  09/15/2017    [facility]



         [facility]



         

 

 Systolic (mm Hg)  98  09/15/2017    [facility]



         [facility]



         

 

 Diastolic (mm Hg)  52  09/15/2017    [facility]



         [facility]



         

 

 Respitory Rate  15  09/15/2017    [facility]



         [facility]



         

 

 Systolic (mm Hg)  106  09/15/2017    [facility]



         

 

 Diastolic (mm Hg)  55  09/15/2017     [facility]



         [facility]



         

 

 Temperature Oral (F)  97.8 F  09/15/2017     [facility]



         

 

 BMI Calculated  40.44  2017     [facility]



         

 

 Height  167.64 cm  2017     [facility]



         

 

 Weight  113.636  2017     [facility]



         

 

 Systolic (mm Hg)  107  2017    [facility]



         

 

 Diastolic (mm Hg)  63  2017     [facility]



         

 

 Respitory Rate  14  2017     [facility]



         

 

 Systolic (mm Hg)  101  2017    [facility]



         

 

 Diastolic (mm Hg)  60  2017    [facility]



         

 

 Respitory Rate  14  2017    [facility]



         

 

 Systolic (mm Hg)  104  2017    [facility]



         

 

 Diastolic (mm Hg)  60  2017    [facility]



         

 

 Respitory Rate  14  2017     [facility]



         

 

 BMI Calculated  42.05  2017    [facility]



         

 

 Height  167.64 cm  2017    [facility]



         

 

 Weight  118.182  2017    [facility]



         

 

 BMI Calculated  42.05  2017    [facility]



         

 

 Weight  118.182  2017    [facility]



         

 

 Height  167.64 cm  2017    [facility]



         

 

 Respitory Rate  16  2017    [facility]



         

 

 Systolic (mm Hg)  108  2017    [facility]



         

 

 Diastolic (mm Hg)  55  2017    [facility]



         

 

 Respitory Rate  16  2017     [facility]



         

 

 Systolic (mm Hg)  100  2017    [facility]



         

 

 Diastolic (mm Hg)  57  2017     [facility]



         

 

 Respitory Rate  17  2017     [facility]



         

 

 Systolic (mm Hg)  108  2017     [facility]



         

 

 Diastolic (mm Hg)  51  2017    [facility]



         

 

 Heart Rate  81  2017     [facility]



         

 

 BMI Calculated  42.21  2017    [facility]



         

 

 Weight  118.636  2017     [facility]



         

 

 Height  167.64 cm  2017     [facility]



         







Encounters







 Location  Location  Encounter  Encounter  Reason  Attending  ADM  DC  Status  
Source



   Details  Type  Number  For  Provider  Date  Date    



         Visit          



                   



                   



                   

 

 [facility]    Day  [id number]    [facility]      [facility]      [facility]



 [facility]                  [facility]



 [facility] Sp[facility]                  Spine



 [facility]  [id number]    [facility]      [facility]      Ortho



 Orthopedic                  and



 and Spine                  Spine



 [facility]  [id number]    [facility]  09/15  09/16    [facility]      Ortho



 Orthopedic                  and



 and Spine                  Spine



 [facility]  [id number]    [facility]  10/27  10/28    [facility]      Ortho



 Orthopedic                  and



 and Spine                  Spine



 [facility]                  



                   



                   







Procedures







 Procedure  Code  Date  Perfomer  Comments  Source



           



           

 

 Injection  [id number]  10/27/2017       [facility]



           [facility]



           



           

 

 Lumbar epidural  [id number]  2017    Diagnostic/ther   [facility]



 block<sup>1</sup>        apuetic  Spine



         bilateral L2-L5  



         medial branch  



         blocks  



           

 

 Injection of facet  [id number]  2017       [facility]



 joint          Spine



           



           

 

 Sling procedure of  [id number]  2016 procedure  [facility]



 bladder        on bladder  Spine



 neck<sup>2</sup>          



           

 

 Sling procedure of  [id number]  2016 procedure   [facility]



 bladder        on bladder  Spine



 neck<sup>1</sup>          



           

 

 Hip  [id number]  2015    [id number] left   [facility]



 replacement<sup>3,        hip repair  Spine



 4</sup>          



           

 

 Hip  [id number]  2015    [id number] left   [facility]



 replacement<sup>2,        hip repair  Spine



 3</sup>          



           

 

 Hip  [id number]  2014    right  [facility]



 replacement<sup>5</          Spine



 sup>          



           

 

 Hip  [id number]  2014    right   [facility]



 replacement<sup>4</          Spine



 sup>          



           

 

 Insertion of  [id number]  2008    pain pump    [facility]



 infusion        and   Spine



 pump<sup>6</sup>          



           

 

 Insertion of  [id number]  2008    pain pump    [facility]



 infusion        and   Spine



 pump<sup>5</sup>          



           

 

 Carpal tunnel  [id number]  2006       [facility]



 release          Spine



           



           

 

 Tonsillectomy  [id number]  1966       [facility]



           [facility]

## 2019-03-14 NOTE — P.PN
Subjective


Date of Service: 03/14/19





Patient seen and examined at bedside with RN.  Chart reviewed.  Case discussed 

with hematology at this time.  No complaints to offer overnight.  Complaints 

and this morning on the right hand.





Review of Systems


10-point ROS is otherwise unremarkable





Physical Examination





- Vital Signs


Temperature: 99.1 F


Blood Pressure: 123/62


Pulse: 122


Respirations: 16


Pulse Ox (%): 97





- Physical Exam


General: Alert, In no apparent distress


HEENT: Atraumatic, PERRLA, EOMI


Neck: Supple, JVD not distended


Respiratory: Clear to auscultation bilaterally, Normal air movement


Cardiovascular: Regular rate/rhythm, Normal S1 S2


Gastrointestinal: Normal bowel sounds, No tenderness


Musculoskeletal: Erythema, Tenderness, Warmth


Integumentary: No rashes


Neurological: Normal speech, Normal tone, Normal affect


Lymphatics: Axilla lymphadenopathy





- Studies


Laboratory Data (last 24 hrs)





03/13/19 17:04: PT 16.6 H, INR 1.43


03/13/19 17:04: WBC 7.7, Hgb 9.3 L, Hct 28.2 L, Plt Count 110 L


03/13/19 17:04: Sodium 135 L, Potassium 3.4 L, BUN 24 H, Creatinine 1.03, 

Glucose 127 H, Magnesium 2.2, Total Bilirubin 0.9, AST 17, ALT 18, Alkaline 

Phosphatase 115, Lipase 58 L





Microbiology Data (last 24 hrs): 








03/13/19 18:38   Stool   Occult Blood - Final





Medications List Reviewed: Yes





Assessment And Plan





- Current Problems (Diagnosis)


(1) Lymphangitis


Current Visit: Yes   Status: Acute   


Plan: 


Patient with acute lymphangitis of the right arm.  3 papular lesions noted on 

the right arm.  May be concerning for skin manifestation with T-cell leukemia, 

lymphoma, HIV or hepatitis.


-patient started on IV doxycycline at this time


-cultures pending at this time


-will get lab work for hepatitis, HIV, p-ANCA, C ANCA, PHYLLIS, ESR, CRP. 


-follow up with results


-peripheral smear positive for monocyte no blasts noted.  Concerning for T-cell 

leukemia.








(2) Anemia


Current Visit: Yes   Status: Acute   


Plan: 


Hemoglobin of 7.9.  Unknown etiology of anemia at this time


-stool occult pending at this time


-most likely secondary to T-cell leukemia however was complete workup for any 

sort of GI bleeding


-patient denied having any melanotic stool or hematemesis at this time


-will monitor closely.


-Heme-Onc consulted at this time


Qualifiers: 


   Anemia type: unspecified type   Qualified Code(s): D64.9 - Anemia, 

unspecified   





(3) Thrombocytopenia


Current Visit: Yes   Status: Acute   


Plan: 


Thrombocytopenia with platelets of 71


-no acute bleeding noted


-Hematology consulted








(4) T-cell leukemia


Current Visit: Yes   Status: Acute   


Plan: 


Patient with possible history of lymphoma in the past for patient


-hematology consult at this time


-abnormal peripheral smear at this time











(5) Raynauds syndrome


Current Visit: Yes   Status: Chronic   


Qualifiers: 


   Raynaud?s-associated gangrene presence: without gangrene   Qualified Code(s)

: I73.00 - Raynaud's syndrome without gangrene   





- Plan





Pending clinical improvement at this time.  Will continue IV antibiotics until 

wound cultures results.  Will also work up patient for any other autoimmune 

disease along with hepatitis and HIV.


Discharge Plan: Other


Plan to discharge in: Greater than 2 days





- Code Status/Comfort Care


Code Status Assessed: Yes


Critical Care: No

## 2019-03-14 NOTE — EKG
Test Date:    2019-03-13               Test Time:    16:59:34

Technician:   ASH                                     

                                                     

MEASUREMENT RESULTS:                                       

Intervals:                                           

Rate:         112                                    

MS:           118                                    

QRSD:         80                                     

QT:           344                                    

QTc:          469                                    

Axis:                                                

P:            14                                     

MS:           118                                    

QRS:          33                                     

T:            -2                                     

                                                     

INTERPRETIVE STATEMENTS:                                       

                                                     

Sinus tachycardia

Inferior infarct, age undetermined

Abnormal ECG

Compared to ECG 08/08/2007 16:54:52

Myocardial infarct finding now present

Sinus rhythm no longer present



Electronically Signed On 03-14-19 08:15:01 CDT by Tyrell Lindsey

## 2019-03-15 NOTE — P.PN
Subjective


Date of Service: 03/15/19


Chief Complaint: Eccymotic bullous blisters on the RUE; thrombocytopenia/ anemia


Subjective: No new changes, Tolerating diet





Patient evaluated this morning in her room. Patient has continued pain to right 

arm where the 3 bullous lesions are noted. Patient otherwise had no other 

complaints. Tolerating antibiotics. Eating and drinking. Slept through the 

night without problem. Still pending autoimmune labs 





<Octavio Ma - Last Filed: 03/15/19 13:49>


Date of Service: 03/15/19





<Alen Byrnes - Last Filed: 03/15/19 14:57>





Review of Systems


General: Unremarkable


Eyes: Unremarkable


ENT: Unremarkable


Respiratory: Unremarkable


Cardiovascular: Unremarkable


Musculoskeletal: Arm Pain


Integumentary: Lesions


Neurological: Unremarkable


Lymphatics: Unremarkable





<Octavio Ma - Last Filed: 03/15/19 13:49>





Physical Examination





- Vital Signs


Temperature: 99.2 F


Blood Pressure: 103/56


Pulse: 109


Respirations: 16


Pulse Ox (%): 93





- Physical Exam


General: Alert, In no apparent distress, Oriented x3, Cooperative


HEENT: Normocephalic, PERRLA, Mucous membr. moist/pink


Neck: Supple, JVD not distended, No Thyromegaly, No LAD


Respiratory: Clear to auscultation bilaterally, Normal air movement


Cardiovascular: No edema, Normal pulses, Regular rate/rhythm, Normal S1 S2, No 

gallops, No rubs, No murmurs


Capillary refill: <2 Seconds


Gastrointestinal: Normal bowel sounds, Soft and benign, Non-distended, No 

tenderness, No masses, No rebound


Musculoskeletal: No clubbing, No swelling, No contractures, No erythema, No 

tenderness, No warmth


Integumentary: Other (Patient has three large lesion noted to right lateral arm 

that are red with bollous formations. Lesions are raised and warm to touch. 

Painful to touch)


Neurological: Normal gait, Normal speech, Normal strength at 5/5 x4 extr, 

Normal tone, Sensation intact, Cranial nerves 3-12 intact, Normal reflexes 2+, 

Normal affect





- Studies


Microbiology Data (last 24 hrs): 








03/13/19 18:38   Stool   Occult Blood - Final





Medications List Reviewed: Yes





<Octavio Ma - Last Filed: 03/15/19 13:49>





- Studies


Microbiology Data (last 24 hrs): 








03/13/19 18:38   Stool   Occult Blood - Final








<Alen Byrnes - Last Filed: 03/15/19 14:57>





Assessment & Plan





- Problems (Diagnosis)


(1) Anemia


Current Visit: Yes   Status: Acute   


Plan: 


Monitor H/H. Stable as of this morning


Qualifiers: 


   Anemia type: unspecified type   Qualified Code(s): D64.9 - Anemia, 

unspecified   





(2) Bullous disorder


Current Visit: Yes   Status: Acute   


Plan: 


Surgical Biopsy ordered this morning 








(3) Coagulopathy


Current Visit: Yes   Status: Acute   





(4) Lymphangitis


Current Visit: Yes   Status: Acute   





(5) T-cell leukemia


Current Visit: Yes   Status: Acute   





(6) Thrombocytopenia


Current Visit: Yes   Status: Acute   





(7) Raynauds syndrome


Current Visit: Yes   Status: Chronic   


Qualifiers: 


   Raynaud?s-associated gangrene presence: without gangrene   Qualified Code(s)

: I73.00 - Raynaud's syndrome without gangrene   


Discharge Plan: Home


Plan to discharge in: 48 Hours





- Code Status/Comfort Care


Code Status Assessed: Yes





<Octavio Ma - Last Filed: 03/15/19 13:49>


Physician Review: Patient Assessed, Agree with Above Assessment and Plan


Physician Review Additional Text: 





Patient seen and evaluate with XIOMARA Contreras. Agree with plan of care. Doubt 

infectious etiology as her skin condition has been present for a while and 

procalcitonin was normal. Will DC antibiotics. Case discussed with Hematology-

Dr. New. Peripheral smear was reviewed by Dr. New along with Pathology. Flow 

cytometry to be sent out. Patient with Iron deficiency. She may have autoimmune 

disorder. Will order Solumedrol 60 mg times one. Will monitor closely. Will 

have Surgery consulted for skin biopsy. Dr. Nicholas informed. Lab sent for HIV 

and Hepatitis. Will continue to monitor CBC. Will add iron supplementation. If 

improved in the next 24 hours, then will proceed with discharge and her follow 

up with Hematology as outpatient. 





Dr. Fritz to take over care tomorrow. Continue to monitor serial Lab and exam. 


Time Spent Managing Pts Care (In Minutes): 55





<Alen Byrnes - Last Filed: 03/15/19 14:57>

## 2019-03-15 NOTE — P.HP
Certification for Inpatient


Patient admitted to: Inpatient


With expected LOS: >2 Midnights


Patient will require the following post-hospital care: None


Practitioner: I am a practitioner with admitting privileges, knowledge of 

patient current condition, hospital course, and medical plan of care.


Services: Services provided to patient in accordance with Admission 

requirements found in Title 42 Section 412.3 of the Code of Federal Regulations





Patient History


Date of Service: 03/13/19


Reason for admission: Eccymotic bullous blisters on the RUE; thrombocytopenia/ 

anemia


History of Present Illness: 





Patient is a 57-year-old female who was admitted to the hospital because of 

mainly GI complaints including vomiting and constipation.  However she states 

she has been feeling very poorly for the last week and.  Around that time she 

developed these blisters with ecchymotic features on the right upper extremity.

  It became more pronounced and painful.  She started having generalized 

weakness and fatigue.  She was not feeling like herself so she came into the 

hospital for further evaluation.  In the emergency room her lab workup revealed 

she was anemic and thrombocytopenic as well.  Patient also has a coagulopathy 

with elevated PT and INR.  I think patient would benefit from Hematology 

consultation.  Hopefully will be able to get him to see the patient while in 

the hospital.  If not she may need transfer as it may be difficult to pinpoint 

exactly what is causing these lesions without a specialty assistance.  But for 

now will go ahead and admit her to the hospital and work her up with additional 

autoimmune labs as well as looking for infectious causes of the bullous, 

ecchymotic lesions


Allergies





Penicillins Adverse Reaction (Verified 03/13/19 22:03)


 Hives





Home Medications: 








Esomeprazole Magnesium [Nexium 24Hr] 2 cap PO DAILY 03/14/19 


Levomefolate/Algal Oil [Deplin-Algal Oil 15 mg Capsule] 1 each PO DAILY 03/14/ 19 


Linaclotide [Linzess] 290 mcg PO DAILY 03/14/19 


Lisinopril [Prinivil] 10 mg PO DAILY 03/14/19 


Metformin HCl 1,000 mg PO BID 03/14/19 


Metoprolol Succinate [Toprol Xl] 100 mg PO DAILY 03/14/19 


Morphine [Morphine Sulfate] 5 mg IT AS DIRECTED 03/14/19 


Ranitidine [Zantac] 150 mg PO DAILY 03/14/19 


Simvastatin 20 mg PO BEDTIME 03/14/19 


Topiramate [Topamax] 50 mg PO BID 03/14/19 


Vortioxetine Hydrobromide [Brintellix] 20 mg PO BEDTIME 03/14/19 


predniSONE [Deltasone] 10 mg PO DAILY 03/14/19 








- Past Medical/Surgical History


Has patient received pneumonia vaccine in the past: No


Diabetic: Yes


-: DEPRESSION/BIPOLAR


-: COPD


-: MIGRANES


-: HTN


-: OBESITY


-: TONSILLECTOMY


-: BROKEN ARM


-: APPENDECTOMY


-: OVARIAN CYSTECTOMY


-: HYSTERECTOMY


-: ABDOMINAL EXPLORATION


-: CHOLICYSTECTOMY


-: HERNIA REPAIR, CARPAL TUNNEL





- Social History


Smoking Status: Former smoker


Alcohol use: No


CD- Drugs: No


Caffeine use: No


Place of Residence: Home





Review of Systems


10-point ROS is otherwise unremarkable





Physical Examination





- Vital Signs


Temperature: 99.8 F


Blood Pressure: 119/58


Pulse: 117


Respirations: 17


Pulse Ox (%): 94





- Physical Exam


General: Alert, In no apparent distress, Oriented x3


HEENT: Atraumatic, PERRLA, Mucous membr. moist/pink, EOMI, Sclerae nonicteric


Neck: Supple, 2+ carotid pulse no bruit, No LAD, Without JVD or thyroid 

abnormality


Respiratory: Clear to auscultation bilaterally, Normal air movement


Cardiovascular: Regular rate/rhythm, Normal S1 S2, No murmurs


Gastrointestinal: Normal bowel sounds, Soft and benign, Non-distended, No 

tenderness


Musculoskeletal: No clubbing, No swelling, No tenderness


Integumentary: Skin lesion, Tenderness/swelling, Erythema, Other (Ecchymotic 

bullous lesions that are painful to touch)


Neurological: Normal gait, Normal speech, Normal strength at 5/5 x4 extr, 

Normal tone, Sensation intact, Cranial nerves 3-12 intact, Normal affect


Lymphatics: No axilla or inguinal lymphadenopathy





- Studies


Microbiology Data (last 24 hrs): 








03/13/19 18:38   Stool   Occult Blood - Final








Assessment & Plan





- Problems (Diagnosis)


(1) Bullous disorder


Current Visit: Yes   Status: Acute   





(2) Anemia


Current Visit: Yes   Status: Acute   


Qualifiers: 


   Anemia type: unspecified type   Qualified Code(s): D64.9 - Anemia, 

unspecified   





(3) Thrombocytopenia


Current Visit: Yes   Status: Acute   





(4) Raynauds syndrome


Current Visit: Yes   Status: Chronic   


Qualifiers: 


   Raynaud?s-associated gangrene presence: without gangrene   Qualified Code(s)

: I73.00 - Raynaud's syndrome without gangrene   





(5) Coagulopathy


Current Visit: Yes   Status: Acute   





- Plan





Plan:


1. Peripheral blood smear review


2. Hematology consultation


3. CBC


4. Patient may need further evaluation by Dermatology


5. Autoimmune studies


6. GI and DVT prophylaxis


Discharge Plan: Home


Plan to discharge in: Greater than 2 days





- Advance Directives


Does patient have a Living Will: No


Does patient have a Durable POA for Healthcare: No





- Code Status/Comfort Care


Code Status Assessed: Yes


Code Status: Full Code


Critical Care: No


Time Spent Managing PTS Care (In Minutes): 45

## 2019-03-15 NOTE — P.CNS
Chief Complaint: Eccymotic bullous blisters on the RUE; thrombocytopenia/ anemia


Allergies





Penicillins Adverse Reaction (Verified 03/13/19 22:03)


 Hives





Home Medications: 








Esomeprazole Magnesium [Nexium 24Hr] 2 cap PO DAILY 03/14/19 


Levomefolate/Algal Oil [Deplin-Algal Oil 15 mg Capsule] 1 each PO DAILY 03/14/ 19 


Linaclotide [Linzess] 290 mcg PO DAILY 03/14/19 


Lisinopril [Prinivil] 10 mg PO DAILY 03/14/19 


Metformin HCl 1,000 mg PO BID 03/14/19 


Metoprolol Succinate [Toprol Xl] 100 mg PO DAILY 03/14/19 


Morphine [Morphine Sulfate] 5 mg IT AS DIRECTED 03/14/19 


Ranitidine [Zantac] 150 mg PO DAILY 03/14/19 


Simvastatin 20 mg PO BEDTIME 03/14/19 


Topiramate [Topamax] 50 mg PO BID 03/14/19 


Vortioxetine Hydrobromide [Brintellix] 20 mg PO BEDTIME 03/14/19 


predniSONE [Deltasone] 10 mg PO DAILY 03/14/19 








- Past Medical/Surgical History


Diabetic: Yes


-: DEPRESSION/BIPOLAR


-: COPD


-: MIGRANES


-: HTN


-: OBESITY


-: TONSILLECTOMY


-: BROKEN ARM


-: APPENDECTOMY


-: OVARIAN CYSTECTOMY


-: HYSTERECTOMY


-: ABDOMINAL EXPLORATION


-: CHOLICYSTECTOMY


-: HERNIA REPAIR, CARPAL TUNNEL





- Social History


Alcohol use: No


CD- Drugs: No


Caffeine use: No


Place of Residence: Home





Physical Examination














Temp Pulse Resp BP Pulse Ox


 


 99.2 F   109 H  16   103/56 L  93 


 


 03/15/19 13:56  03/15/19 13:56  03/15/19 13:56  03/15/19 13:56  03/15/19 13:56








General: In no apparent distress


HEENT: Sclerae nonicteric


Respiratory: Normal air movement


Musculoskeletal: Other (On the patient's right arm there are 2 areas that 

measure approximately 5 cm and 6 cm better almost overlapping circles.  These 

were on the upper lateral portion of the right arm.  On the dorsum of the lower 

arm there is a larger area with a a ruptured bullae.  The circular areas are 

reddish purple in size.  The appear to be more necrotic towards the center with 

ability OR as opposed to the car circumferential areas.  There is seems to be a 

transition point.  I have been asked to biopsy these areas.)


Lymphatics: No axilla or inguinal lymphadenopathy


Conclusions/Impression: 





This patient has this unusual circular rash on her body take biopsies of these 

for histopathology immunology etc.  I spun into the pathologist.  I will send 

for samples in total.  2 in formaldehyde, 1 in saline. The risks of this 

procedure have been discussed with the patient. She understands and wants to 

proceed.  We will do under local at the bedside.

## 2019-03-15 NOTE — P.OP
Date of Service: 03/15/19








Findings and Operative Technique





Preoperative diagnosis:  Lesions of the right arm x3


Postoperative diagnosis:  The same


Procedure performed:  Punch biopsies of lesions of the right arm


Surgeon:  Yu


Findings in technique:





Under local anesthesia, the area of the right arm after having prepped with a 

Betadine solution was draped in usual manner. A surgical time-out was obtained.

  There was an infiltrating 1% lidocaine.  Using Ace punch biopsy approximately 

6 S attempts were made to obtain samples.  For these were successful.  This pus 

was sent for histopathology in both for mild height and normal study as per the 

pathologist.  At the end of procedure the wounds were clean and dry. Some 

bullae on the upper arms were opened and the drainage controlled to remove them 

from the ability sterile dressing was then applied and she was stable at the 

end of the procedure.

## 2019-03-16 NOTE — PN
Date of Progress Note:  03/16/2019



History:  The patient seen and examined.  Chart reviewed and case discussed 
with RN.  The patient denies any further GI bleed.  The patient is much more 
concerned with her lesions of her arm, inside her mouth, and her scalp.  She 
states she wants treatment for those lesions.  I explained to her that 
treatment would require a diagnosis first.  Biopsies have been taken by Dr. Nicholas, currently awaiting pathology report.  The patient does report that the 
cotton candy solution helps improve her discomfort in the lesion of the mouth.



Medications:  List reviewed.



Physical Examination:

Vital Signs:  Temperature 97.3, heart rate 90, blood pressure 109/58, 
respirations 18, O2 100% on room air. 

General:  Awake, alert, oriented x3.  Appears older than the stated age female.
  Obese.  BMI 35. 

CV:  S1, S2.  Regular rate and rhythm.  Peripheral pulses present. 

Respiratory:  Moving air well bilaterally.  No wheezing or stridor. 

Gastrointestinal:  Abdomen is soft, nontender, nondistended.  Positive bowel 
sounds. 

Extremities:  No clubbing, cyanosis, or edema. 

Neuro:  Cranial nerves 2-12 intact grossly.  No focal neurological deficit.  
Speech is normal. 

Skin:  Right upper extremity bandaged, status post biopsy.  The patient does 
have some oozing around the biopsy site on the arm. 

Psych:  Mood is anxious.  Affect is flat.  Insight and judgment are good.



Laboratory Data:  Sodium 141, potassium 3.9, chloride 114, CO2 22, BUN 19, 
creatinine 0.52, glucose 112, calcium 7.9.  WBC pending.  RPR titer is 
nonreactive.  Hepatitis panel and HIV panel are pending.  Immunology panel is 
also pending.  Blood cultures no growth to date.  Stool occult blood is 
negative.



Assessment And Plan:  A 57-year-old female with:

1.   Anemia secondary to iron deficiency.  We will monitor hemoglobin and 
hematocrit, transfuse blood as needed.

2.   Bullous disorder.  Surgical biopsy has been completed by Dr. Nicholas.  We 
appreciate his input.  We will follow up with pathology report.

3.   Coagulopathy.

4.   Obesity.

5.   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 Smear showed blast cells

6.   Thrombocytopenia. unclear etiology. Maybe secondary to hematological 
malignancy 

7.   Raynaud syndrome.

8.   Deep vein thrombosis prophylaxis addressed. 

9.   Nausea, vomiting non intractable: resolved. 



The patient's skin condition has been present for several months.  No 
infectious etiology suspected.  Antibiotics have been discontinued.  Peripheral 
smear has been completed.  Dr. New is also on the case.  Flow cytometry has 
been sent out.  The patient may have immune disorder.  The patient received Solu
-Medrol x1.  We will follow up with immune markers and HIV and hepatitis panel.
  Likely discharge in next 24-48 hours depending on clinical response.





/DEMARIO

DD:  03/16/2019 16:36:22   Voice ID:  [id number]

DT:  03/16/2019 18:52:04   Report ID:  [id number]

[facility]

## 2019-03-18 NOTE — DS
Date of Discharge:  03/17/2019



Consultants:  

1.Dr. Nicholas, General Surgery.

2.Dr. New, Oncology.



Admitting Diagnoses:  

1.Bullous skin lesions.

2.Anemia.

3.Thrombocytopenia.

4.Raynaud phenomenon.

5.Coagulopathy.



Discharge Diagnoses:  

1.Bullous disorder with skin lesion status post biopsy.  Pathology report pending.

2.Iron deficiency anemia.  Hemoglobin stable.  The patient will need outpatient Gastroenterology fol
lowup and scope.

3.Coagulopathy.

4.Lymphangitis.

5.T-cell leukemia.

6.Thrombocytopenia.

7.Raynaud phenomena.

8.Chronic pain syndrome, on narcotics.

9.Chronic obstructive pulmonary disease.

10.Major depressive disorder.

11.Bipolar disorder.

12.History of migraine headaches.

13.Essential hypertension.

14.Obesity, BMI 35.



Hospital Course:  The patient is a 57-year-old female who has a past medical history of COPD, hyperte
nsion, diabetes, depression, migraines, comes in with history of bullous lesions with unclear etiolog
y.  The patient was admitted for further evaluation.  She was found to be thrombocytopenic and anemic
.  The patient was started on steroids.  Dr. Nicholas was consulted, who performed a biopsy and flow c
ytometry was also sent off.  Did not seem to be infectious in etiology.  Cultures were negative.  Had
 a normal white count.  Antibiotics were then discontinued.  I did speak with Dr. New as a phone con
sultation to have the patient follow up with her as an outpatient.  She will follow up with the patie
nt depending on the flow cytometry results.  Her peripheral blood smear did show some blast cells and
 there is a possibility of T-cell leukemia which is usually chronic and can result in lesions of the 
skin.  However, until flow cytometry results are back we will proceed with IV steroids to rule out an
y sort of autoimmune type disorders.  The patient's immunology panel has been sent off and is pending
.  RPR was negative.  Hepatitis panel and HIV panel are also pending at this time.  The patient did h
ave some electrolyte abnormalities which were corrected.  She was found to be anemic and has iron def
iciency anemia.  Her H and H remained stable.  She did not have any melenic stools.  Her fecal occult
 blood was negative.  The patient will need GI workup as an outpatient for a colonoscopy to rule out 
malignancy as a cause of this anemia.  The patient was then discharged home in a stable condition.



Activity:  As tolerated.  No driving or operating heavy machinery while on narcotics.



Diet:  Heart healthy.



Followup:  Follow up with primary care physician, Dr. London in 2-3 days.  Follow up with hematologist
 Dr. New in 1 week for flow cytometry results.  Return to ER for worsening condition.  The patient t
o follow up with PCP regarding biopsy and pathology report.



Medications:  As per medication reconciliation list.  The patient will be on a steroid taper.  The maribel ontiveros is on chronic steroids.



Physical Examination:

General:  Awake, alert, oriented x3. Obese female. 

CV:  S1, S2.  No murmurs. Respiratory:  Moving air well bilaterally. 

Abdomen:  Soft, nontender, nondistended.  Positive bowel sounds. 

Extremities:  No clubbing, cyanosis, edema. 

Neurologic:  Nonfocal. 

Skin: The patient has bullous lesions on the right upper extremity. 



Total time spent discharging the patient was 37 minutes.





/DEMARIO

DD:  03/17/2019 14:43:28Voice ID:  [id number]

DT:  03/18/2019 03:05:18Report ID:  [id number]

## 2019-03-26 NOTE — XMS REPORT
Continuity of Care Document

 Created on:2018



Patient:GILDARDO RYAN

Sex:Female

:1961

External Reference #:[id number]





Demographics







 Address  [address] [place] [id number]

 

 Phone  [phone]

 

 Preferred Language  Unknown

 

 Marital Status  Unknown

 

 [employer]ist Affiliation  Unknown

 

 Race  Unknown

 

 Ethnic Group  Unknown









Author







 Organization  [facility]









Problems







 Problem  Status  Onset  Classification  Date  Comments  Source



     Date    Reported    



             



             



             

 

 BACK  Active           [facility]



     018        [facility]



             



             

 

 BACK / AQUA  Active          [facility]



     018        [facility]



             



             

 

 ARTHROPATHY OF LUMBAR  Active          [facility]



 FACET JOINTS    [address]        [place]



             



             



             

 

 ARTHROPATHY OF LUMBAR  Active          [facility]



 FACET JOINT    [address]        [place]



             



             



             

 

 Bipolar 1 disorder  Resolved    Problem  10/30/2017     [facility]



             



             

 

 Diabetes  Active    Problem  10/30/2017     [facility]



             



             

 

 Acid reflux  Active    Problem  10/30/2017    [facility]



             



             

 

 Hypercholesteremia  Active    Problem  10/30/2017     [facility]



             



             

 

 Asthma in adult  Active    Problem  10/30/2017    [facility]



             



             

 

 Hypertension  Active    Problem  10/30/2017     [facility]



             



             

 

 Arthritis  Resolved    Problem  10/30/2017     [facility]



             



             

 

 Migraines  Active    Problem  10/30/2017     [facility]



             



             

 

 Depression  Resolved    Problem  10/30/2017     [facility]



             



             

 

 Apnea, sleep  Resolved    Problem  10/30/2017     [facility]



             



             

 

 Bladder incontinence  Active    Problem  10/30/2017     [facility]



             



             







Medications







 Medication  Details  Route  Status  Patient  Ordering  Order  Source



         Instructions  Provider  Date  



               



               



               

 

 acetaminophen-10  1,000 mg, 100    Inactive      10/27/  [facility]



 mg/mL  mL, Route: IV,            [facility]



 INTRAVENOUS  Drug form: INJ,            and



 solution  ONCE, Dosing            Spine



   Weight 112.727,            



   kg, Start date:            



   10/27/17            



   12:17:00 CDT,            



   Stop date:            



   10/27/17            



   12:17:00            



   CDTNotes: Infuse            



   over 15 minutes            



   Do not exceed            



   4gm/day of            



   acetaminophen            



   *** MEDICATION            



   WASTE ***            



   Product Size:            



   1000 mg Product            



   Wasted:  ___ mg            



               



               

 

 Lactated Ringers  1,000 mL, Rate:    No Longer      10/27/  



 1,000 mL  125 ml/hr,    Active        [facility]



   Infuse over: 8            and



   hr, Route: IV,            Spine



   Dosing Weight            



   112.784 kg,            



   Total Volume:            



   1,000, Start            



   date: 10/27/17            



   12:17:00 CDT,            



   Duration: 30            



   day, Stop date:            



   17            



   12:16:00 CST            



               



               

 

 ketOROLAC 15  15 mg, 0.5 mL,    Inactive      10/27/  [facility]



 mg/mL injectable  Route: IV, Drug            [facility]



 solution  form: INJ, ONCE,            and



   Dosing Weight            Spine



   112.727, kg,            



   Priority: NOW,            



   Start date:            



   10/27/17            



   12:17:00 CDT,            



   Stop date:            



   10/27/17            



   12:17:00            



   CDTNotes: (Same            



   as:Toradol) IV            



   bolus must be            



   given >15            



   seconds. Give IM            



   administration            



   slowly and            



   deeply into the            



   muscle.  Not for            



   use > 4 days            



   *** MEDICATION            



   WASTE ***            



   Product Size:            



   30 mg Product            



   Wasted:  ___ mg            



               



               

 

 hydromorphone  1 mg, 0.5 mL,    No Longer      10/27/  [facility]



   Route: IVP, Drug    Active        [facility]



   form: INJ, PRN,            and



   Dosing Weight            Spine



   112.727, kg, PRN            



   Pain Score 7-10,            



   Start date:            



   10/27/17            



   12:17:00 CDT,            



   Duration: 6            



   doses or times,            



   Stop date:            



   10/28/17 0:00:00            



   CDTNotes: (Same            



   as: Dilaudid)            



               



               

 

 promethazine  12.5 mg, 0.5 mL,    No Longer      10/27/  [facility]



   Route: IVPB,    Active        [facility]



   Drug form: INJ,            and



   Q4H, Dosing            Spine



   Weight 112.784,            



   kg, PRN Nausea &            



   Vomiting, Start            



   date: 10/27/17            



   12:17:00 CDT,            



   Duration: 30            



   day, Stop date:            



   17            



   12:16:00            



   CSTNotes: Do not            



   give IV push.            



   (Same as:            



   Phenergan)            



               

 

 ondansetron  4 mg, 2 mL,    No Longer      10/27/  [facility]



   Route: IVP, Drug    Active        [facility]



   form: INJ, ONCE,            and



   Dosing Weight            Spine



   112.727, kg, PRN            



   Nausea &            



   Vomiting, Start            



   date: 10/27/17            



   12:17:00            



   CDTNotes: (Same            



   as: Zofran)            



   *** MEDICATION            



   WASTE ***            



   Product Size:  4            



   mg Product            



   Wasted:  ___ mg            



               



               

 

 acetaminophen-hy  1 tab, Route:    No Longer      10/27/  [facility]



 drocodone 325  PO, Drug Form:    Active      2017  [facility]



 mg-10 mg oral  TAB, Dosing            and



 tablet  Weight 112.727,            Spine



   kg, Q4H, PRN            



   Pain Score 4-6,            



   Start date:            



   10/27/17            



   12:17:00 CDT,            



   Duration: 30            



   day, Stop date:            



   17            



   12:16:00            



   CSTNotes: Do not            



   exceed 4gm/day            



   of            



   acetaminophen.            



   (Same as: Norco            



   325/10)            



               

 

 Saline Flush  10 ml, Route:    No Longer      10/27/  [facility]



 0.9%  IVP, Drug Form:    Active        [facility]



   INJ, Dosing            and



   Weight 112.727,            Spine



   kg, PRN, PRN            



   Line Flush,            



   Start date:            



   10/27/17            



   10:42:00 CDT,            



   Duration: 30            



   day, Stop date:            



   17 9:41:00            



   CSTNotes:            



   preservative            



   free.            



               

 

 Lactated Ringers  1,000 mL, Rate:    Inactive      10/27/  [facility]



 1,000 mL  125 ml/hr,          2017  [facility]



   Infuse over: 8            and



   hr, Route: IV,            Spine



   Dosing Weight            



   112.727 kg,            



   Total Volume:            



   1,000, Start            



   date: 10/27/17            



   10:42:00 CDT,            



   Duration: 30            



   day, Stop date:            



   17            



   10:41:00 CST            



               



               

 

 Hydromorphone  0.5 mg, 0.25 mL,    No Longer      09/15/  [facility]



   Route: IVP, Drug    Active        [facility]



   form: INJ, PRN,            and



   Dosing Weight            Spine



   113.636, kg, PRN            



   Pain Score 4-6,            



   Start date:            



   09/15/17            



   11:27:00 CDT,            



   Duration: 6            



   doses or times,            



   Stop date:            



   17 0:00:00            



   CDTNotes: Same            



   as Dilaudid            



               



               

 

 Ondansetron  4 mg, 2 mL,    No Longer      09/15/  [facility]



   Route: IVP, Drug    Active        [facility]



   form: INJ, ONCE,            and



   Dosing Weight            Spine



   113.636, kg, PRN            



   Nausea &            



   Vomiting, Start            



   date: 09/15/17            



   11:27:00            



   CDTNotes: (Same            



   as: Zofran)            



   *** MEDICATION            



   WASTE ***            



   Product Size:  4            



   mg Product            



   Wasted:  ___ mg            



               



               

 

 Promethazine  12.5 mg, 0.5 mL,    No Longer      09/15/  [facility]



   Route: IVPB,    Active        [facility]



   Drug form: INJ,            and



   Q4H, Dosing            Spine



   Weight 113.636,            



   kg, PRN Nausea &            



   Vomiting, Start            



   date: 09/15/17            



   11:27:00 CDT,            



   Duration: 30            



   day, Stop date:            



   10/15/17            



   11:26:00            



   CDTNotes: Do not            



   give IV push.            



   (Same as:            



   Phenergan)            



               

 

 Lactated Ringers  1,000 mL, Rate:    No Longer      09/15/  [facility]



 1,000 mL  125 ml/hr,    Active        [facility]



   Infuse over: 8            and



   hr, Route: IV,            Spine



   Dosing Weight            



   113.636 kg,            



   Total Volume:            



   1,000, Start            



   date: 09/15/17            



   11:27:00 CDT,            



   Duration: 30            



   day, Stop date:            



   10/15/17            



   11:26:00 CDT            



               



               

 

 Acetaminophen  1 tab, Route:    No Longer      09/15/  [facility]



 325 MG /  PO, Drug Form:    Active        [facility]



 Hydrocodone  TAB, Dosing            and



 Bitartrate 10 MG  Weight 113.636,            Spine



 Oral Tablet  kg, Q4H, PRN            



   Pain Score 4-6,            



   Start date:            



   09/15/17            



   11:27:00 CDT,            



   Duration: 30            



   day, Stop date:            



   10/15/17            



   11:26:00            



   CDTNotes: Do not            



   exceed 4gm/day            



   of            



   acetaminophen.            



   (Same as: Norco            



   325/10)            



               

 

 Lactated Ringers  1,000 mL, Rate:    Inactive      09/15/  [facility]



 1,000 mL  125 ml/hr,          2017  [facility]



   Infuse over: 8            and



   hr, Route: IV,            Spine



   Dosing Weight            



   113.636 kg,            



   Total Volume:            



   1,000, Start            



   date: 09/15/17            



   10:04:00 CDT,            



   Duration: 30            



   day, Stop date:            



   10/15/17            



   10:03:00 CDT            



               



               

 

 Promethazine  12.5 mg, 0.5 mL,    No Longer      



   Route: IVPB,    Active        [facility]



   Drug form: INJ,            and



   Q4H, Dosing            Spine



   Weight 118.182,            



   kg, PRN Nausea &            



   Vomiting, Start            



   date: 17            



   9:46:00 CDT,            



   Duration: 30            



   day, Stop date:            



   17 9:45:00            



   CDTNotes: Do not            



   give IV push.            



   (Same as:            



   Phenergan)            



               

 

 Ondansetron  4 mg, 2 mL,    No Longer      



   Route: IVP, Drug    Active      2017  [facility]



   form: INJ, ONCE,            and



   Dosing Weight            Spine



   118.182, kg, PRN            



   Nausea &            



   Vomiting, Start            



   date: 17            



   9:46:00            



   CDTNotes: (Same            



   as: Zofran)            



   *** MEDICATION            



   WASTE ***            



   Product Size:  4            



   mg Product            



   Wasted:  ___ mg            



               



               

 

 Hydromorphone  1 mg, 0.5 mL,    No Longer      



   Route: IVP, Drug    Active        [facility]



   form: INJ, PRN,            and



   Dosing Weight            Spine



   118.182, kg, PRN            



   Pain Score 7-10,            



   Start date:            



   17 9:46:00            



   CDT, Duration:            



   30 day, Stop            



   date: 17            



   9:45:00            



   CDTNotes: Same            



   as Dilaudid            



               



               

 

 Acetaminophen  1 tab, Route:    No Longer      



 325 MG /  PO, Drug Form:    Active        [facility]



 Hydrocodone  TAB, Dosing            and



 Bitartrate 10 MG  Weight 118.182,            Spine



 Oral Tablet  kg, Q4H, PRN            



   Pain Score 4-6,            



   Start date:            



   17 9:46:00            



   CDT, Duration:            



   30 day, Stop            



   date: 17            



   9:45:00            



   CDTNotes: Do not            



   exceed 4gm/day            



   of            



   acetaminophen.            



   (Same as: Norco            



   325/10)            



               

 

 Lactated Ringers  1,000 mL, Rate:    No Longer      



 1,000 mL  125 ml/hr,    Active        [facility]



   Infuse over: 8            and



   hr, Route: IV,            Spine



   Dosing Weight            



   118.182 kg,            



   Total Volume:            



   1,000, Start            



   date: 17            



   9:46:00 CDT,            



   Duration: 30            



   day, Stop date:            



   17 9:45:00            



   CDT            



               

 

 Lactated Ringers  1,000 mL, Rate:    Inactive      



 1,000 mL  125 ml/hr,          2017  [facility]



   Infuse over: 8            and



   hr, Route: IV,            Spine



   Dosing Weight            



   118.182 kg,            



   Total Volume:            



   1,000, Start            



   date: 17            



   8:53:00 CDT,            



   Duration: 30            



   day, Stop date:            



   17 8:52:00            



   CDT            



               

 

 Lactated Ringers  1,000 mL, Rate:    No Longer      



 1,000 mL  125 ml/hr,    Active        [facility]



   Infuse over: 8            and



   hr, Route: IV,            Spine



   Dosing Weight            



   118.636 kg,            



   Total Volume:            



   1,000, Start            



   date: 17            



   10:52:00 CDT,            



   Duration: 30            



   day, Stop date:            



   17            



   10:51:00 CDT            



               



               

 

 Promethazine  12.5 mg, 0.5 mL,    No Longer      



   Route: IVPB,    Active        [facility]



   Drug form: INJ,            and



   Q4H, Dosing            Spine



   Weight 118.636,            



   kg, PRN Nausea &            



   Vomiting, Start            



   date: 17            



   10:52:00 CDT,            



   Duration: 30            



   day, Stop date:            



   17            



   10:51:00            



   CDTNotes: Do not            



   give IV push.            



   (Same as:            



   Phenergan)            



               

 

 Ondansetron  4 mg, 2 mL,    No Longer      



   Route: IVP, Drug    Active        [facility]



   form: INJ, ONCE,            and



   Dosing Weight            Spine



   118.636, kg, PRN            



   Nausea &            



   Vomiting, Start            



   date: 17            



   10:52:00            



   CDTNotes: (Same            



   as: Zofran)            



   *** MEDICATION            



   WASTE ***            



   Product Size:  4            



   mg Product            



   Wasted:  ___ mg            



               



               

 

 Hydromorphone  1 mg, 0.5 mL,    No Longer      



   Route: IVP, Drug    Active        [facility]



   form: INJ, PRN,            and



   Dosing Weight            Spine



   118.636, kg, PRN            



   Pain Score 7-10,            



   Start date:            



   17            



   10:52:00 CDT,            



   Duration: 3            



   doses or times,            



   Stop date:            



   17 0:00:00            



   CDTNotes: Same            



   as Dilaudid            



               



               

 

 Acetaminophen  1 tab, Route:    No Longer      



 325 MG /  PO, Drug Form:    Active      2017  [facility]



 Hydrocodone  TAB, Dosing            and



 Bitartrate 10 MG  Weight 118.636,            Spine



 Oral Tablet  kg, Q4H, PRN            



   Pain Score 4-6,            



   Start date:            



   17            



   10:52:00 CDT,            



   Duration: 30            



   day, Stop date:            



   17            



   10:51:00            



   CDTNotes: Do not            



   exceed 4gm/day            



   of            



   acetaminophen.            



   (Same as: Norco            



   325/10)            



               

 

 Ondansetron  4 mg, 2 mL,    No Longer      



   Route: IVP, Drug    Active      2017  [facility]



   form: INJ, ONCE,            and



   Dosing Weight            Spine



   118.636, kg, PRN            



   Nausea &            



   Vomiting, Start            



   date: 17            



   10:38:00            



   CDTNotes: (Same            



   as: Zofran)            



   *** MEDICATION            



   WASTE ***            



   Product Size:  4            



   mg Product            



   Wasted:  ___ mg            



               



               

 

 Naloxone  0.4 mg, 1 mL,    Inactive      



   Route: IVP, Drug            [facility]



   form: INJ,            and



   Q2MIN, Dosing            Spine



   Weight 118.636,            



   kg, PRN Narcotic            



   Reversal, Start            



   date: 17            



   10:38:00 CDT,            



   Duration: 8            



   doses or times,            



   Stop date:            



   17            



   18:37:00            



   CDTNotes: Same            



   as Narcan            



               

 

 Flumazenil  0.2 mg, 2 mL,    Inactive      



   Route: IVP, Drug            [facility]



   form: INJ, PRN,            and



   Dosing Weight            Spine



   118.636, kg, PRN            



   Benzodiazepine            



   Reversal,            



   Initial dose,            



   Start date:            



   17            



   10:38:00 CDT,            



   Duration: 8 hr,            



   Stop date:            



   17            



   18:37:00            



   CDTNotes: (Same            



   as: Romazicon)            



               



               

 

 Lactated Ringers  1,000 mL, Rate:    Inactive      



 1,000 mL  125 ml/hr,          2017  [facility]



   Infuse over: 8            and



   hr, Route: IV,            Spine



   Dosing Weight            



   118.636 kg,            



   Total Volume:            



   1,000, Start            



   date: 17            



   9:42:00 CDT,            



   Duration: 30            



   day, Stop date:            



   17 9:41:00            



   CDT            



               

 

 metoprolol 100  100 mg=1 tab,    Active      



 mg oral tablet,  PO, Daily, # 30          2017  [facility]



 extended release  tab, 0 Refill(s)            and



               Spine



               



               



               

 

 lisinopril 10 mg  10 mg=1 tab, PO,    Active        



 oral tablet  Daily, # 30 tab,          2017  [facility]



   0 Refill(s)            and



               Spine



               



               



               

 

 topiramate 50 MG  50 mg=1 tab, PO,    Active        



 Oral Tablet  BID, # 60 tab, 0          2017  [facility]



 [Topamax]  Refill(s)            and



               Spine



               



               



               

 

 Lurasidone  60 mg=1 tab, PO,    Active      07/06/  [facility]



 Hydrochloride 60  Daily, 0          2017  [facility]



 MG Oral Tablet  Refill(s)            and



 [Latuda]              Spine



               



               



               

 

 Deplin 15 mg  15 mg=1 cap, PO,    Active        



 oral capsule  Daily, 0            [facility]



   Refill(s)            and



               Spine



               



               



               

 

 Simvastatin 10  10 mg=1 tab, PO,    Active        



 MG Oral Tablet  Bedtime, # 30            [facility]



 [Zocor]  tab, 0 Refill(s)            and



               Spine



               



               



               

 

 Furosemide 20 MG  20 mg=1 tab, PO,    Active        



 Oral Tablet  Daily, # 30 tab,          2017  [facility]



 [Lasix]  0 Refill(s)            and



               Spine



               



               



               

 

 24 HR  100 mg=1 tab,    Active        



 Desvenlafaxine  PO, Daily, # 30            [facility]



 100 MG Extended  tab, 0 Refill(s)            and



 Release Tablet              Spine



 [Pristiq]              



               



               

 

 Metformin  500 mg=1 tab,    Active        



 hydrochloride  PO, BID-Meals, #            [facility]



 500 MG Oral  30 tab, 0            and



 Tablet  Refill(s)            Spine



               



               



               

 

 amitriptyline 75  75 mg=1 tab, PO,    Active        



 mg oral tablet  Bedtime, # 30            [facility]



   tab, 0 Refill(s)            and



               Spine



               



               



               







Allergies, Adverse Reactions, Alerts







 Substance  Category  Reaction  Severity  Reaction  Status  Date  Comments  
Source



         type    Reported    



                 



                 



                 

 

 penicillins  Assertion      Drug  Active    eyes  [facility]



 <sup>1</sup        allergy      swell,  [facility]



 >              rash  and



               hives  Spine



                 



                 



                 







Immunizations







 Immunization  Date Given  Site  Status  Last Updated  Comments  Source



             



             







Results







 Order  Results  Value  Reference  Date  Interpretation  Comments  Source



 Name      Range        



               



               







Vital Signs







 Vital Sign  Value  Date  Comments  Source



         

 

 Systolic (mm Hg)  102  10/27/2017     [facility]



         [facility]



         

 

 Diastolic (mm Hg)  61  10/27/2017     [facility]



         [facility]



         

 

 Respitory Rate  16  10/27/2017     [facility]



         [facility]



         

 

 BMI Calculated  40.15  10/27/2017     [facility]



         [facility]



         

 

 Weight  112.784  10/27/2017     [facility]



         [facility]



         

 

 Height  167.6 cm  10/27/2017     [facility]



         [facility]



         

 

 Respitory Rate  16  10/27/2017     [facility]



         [facility]



         

 

 Systolic (mm Hg)  100  10/27/2017     [facility]



         [facility]



         

 

 Diastolic (mm Hg)  60  10/27/2017     [facility]



         [facility]



         

 

 Temperature Oral (F)  97.6 F  10/27/2017     [facility]



         [facility]



         

 

 Respitory Rate  16  10/27/2017     [facility]



         [facility]



         

 

 Systolic (mm Hg)  104  10/27/2017    [facility]



         [facility]



         

 

 Diastolic (mm Hg)  59  10/27/2017     [facility]



         [facility]



         

 

 Temperature Oral (F)  98.2 F  10/27/2017    [facility]



         [facility]



         

 

 Systolic (mm Hg)  107  09/15/2017    [facility]



         [facility]



         

 

 Diastolic (mm Hg)  66  09/15/2017    [facility]



         [facility]



         

 

 Respitory Rate  16  09/15/2017    [facility]



         [facility]



         

 

 Respitory Rate  15  09/15/2017    [facility]



         [facility]



         

 

 Systolic (mm Hg)  98  09/15/2017    [facility]



         [facility]



         

 

 Diastolic (mm Hg)  52  09/15/2017    [facility]



         [facility]



         

 

 Respitory Rate  15  09/15/2017    [facility]



         [facility]



         

 

 Systolic (mm Hg)  106  09/15/2017    [facility]



         [facility]



         

 

 Diastolic (mm Hg)  55  09/15/2017     [facility]



         [facility]



         

 

 Temperature Oral (F)  97.8 F  09/15/2017     [facility]



         [facility]



         

 

 BMI Calculated  40.44  2017     [facility]



         [facility]



         

 

 Height  167.64 cm  2017     [facility]



         [facility]



         

 

 Weight  113.636  2017     [facility]



         [facility]



         

 

 Systolic (mm Hg)  107  2017    [facility]



         [facility]



         

 

 Diastolic (mm Hg)  63  2017     [facility]



         [facility]



         

 

 Respitory Rate  14  2017     [facility]



         [facility]



         

 

 Systolic (mm Hg)  101  2017    [facility]



         

 

 Diastolic (mm Hg)  60  2017    [facility]



         

 

 Respitory Rate  14  2017    [facility]



         

 

 Systolic (mm Hg)  104  2017    [facility]



         

 

 Diastolic (mm Hg)  60  2017    [facility]



         [facility]



         

 

 Respitory Rate  14  2017     [facility]



         [facility]



         

 

 BMI Calculated  42.05  2017    [facility]



         [facility]



         

 

 Height  167.64 cm  2017    [facility]



         [facility]



         

 

 Weight  118.182  2017    [facility]



         [facility]



         

 

 BMI Calculated  42.05  2017    [facility]



         

 

 Weight  118.182  2017    [facility]



         [facility]



         

 

 Height  167.64 cm  2017    [facility]



         [facility]



         

 

 Respitory Rate  16  2017    [facility]



         [facility]



         

 

 Systolic (mm Hg)  108  2017    [facility]



         [facility]



         

 

 Diastolic (mm Hg)  55  2017    [facility]



         

 

 Respitory Rate  16  2017     [facility]



         [facility]



         

 

 Systolic (mm Hg)  100  2017    [facility]



         [facility]



         

 

 Diastolic (mm Hg)  57  2017     [facility]



         [facility]



         

 

 Respitory Rate  17  2017     [facility]



         [facility]



         

 

 Systolic (mm Hg)  108  2017     [facility]



         [facility]



         

 

 Diastolic (mm Hg)  51  2017    [facility]



         [facility]



         

 

 Heart Rate  81  2017     [facility]



         [facility]



         

 

 BMI Calculated  42.21  2017    [facility]



         [facility]



         

 

 Weight  118.636  2017     [facility]



         [facility]



         

 

 Height  167.64 cm  2017     [facility]



         [facility]



         







Encounters







 Location  Location  Encounter  Encounter  Reason  Attending  ADM  DC  Status  
Source



   Details  Type  Number  For  Provider  Date  Date    



         Visit          



                   



                   



                   

 

 [facility]    Day  [id number]    [facility]      [facility]      Ortho



 Orthopedic                  and



 and Spine                  Spine



 [facility]  [id number]    [facility]      [facility]      Ortho



 Orthopedic                  and



 and Spine                  Spine



 [facility]  [id number]    [facility]  09/15  09/16    [facility]      Ortho



 Orthopedic                  and



 and Spine                  Spine



 [facility]  [id number]    [facility]  10/27  10/28    [facility]      Ortho



 Orthopedic                  and



 and Spine                  Spine



 [facility]                  



                   



                   







Procedures







 Procedure  Code  Date  Perfomer  Comments  Source



           



           

 

 Injection  [id number]  10/27/2017       [facility]



           Spine



           



           

 

 Lumbar epidural  [id number]  2017    Diagnostic/ther   [facility]



 block<sup>1</sup>        apuetic  Spine



         bilateral L2-L5  



         medial branch  



         blocks  



           

 

 Injection of facet  [id number]  2017       [facility]



 joint          Spine



           



           

 

 Sling procedure of  [id number]  2016 procedure  [facility]



 bladder        on bladder  Spine



 neck<sup>2</sup>          



           

 

 Sling procedure of  [id number]  2016 procedure   [facility]



 bladder        on bladder  Spine



 neck<sup>1</sup>          



           

 

 Hip  [id number]  2015    [id number] left   [facility]



 replacement<sup>3,        hip repair  Spine



 4</sup>          



           

 

 Hip  [id number]  2015    [id number] left   [facility]



 replacement<sup>2,        hip repair  Spine



 3</sup>          



           

 

 Hip  [id number]  2014    right  [facility]



 replacement<sup>5</          Spine



 sup>          



           

 

 Hip  [id number]  2014    right   [facility]



 replacement<sup>4</          Spine



 sup>          



           

 

 Insertion of  [id number]  2008    pain pump    [facility]



 infusion        and   Spine



 pump<sup>6</sup>          



           

 

 Insertion of  [id number]  2008    pain pump    [facility]



 infusion        and   Spine



 pump<sup>5</sup>          



           

 

 Carpal tunnel  [id number]  2006       [facility]



 release          Spine



           



           

 

 Tonsillectomy  [id number]  1966       [facility]



           Spine

## 2019-03-26 NOTE — XMS REPORT
Clinical Summary

 Created on:2019



Patient:Gildardo Ryan

Sex:Female

:1961

External Reference #:[id number]





Demographics







 Address  [address] [place] [address]

 

 Home Phone  [phone]

 

 Mobile Phone  [phone]

 

 Home Phone  [phone]

 

 Home Phone  [phone]

 

 Email Address  [email or web address]

 

 Preferred Language  English

 

 Marital Status  

 

 Gnosticist Affiliation  Unknown

 

 Race  White

 

 Ethnic Group  Not  or 









Author







 Organization  [facility]

 

 Address  [address] [place] [address]









Support







 Name  Relationship  Address  Phone

 

 Brett Ryan  Spouse  Unavailable  [phone]









Care Team Providers







 Name  Role  Phone

 

 Arnulfo London MD  Primary Care Provider  [phone]









Allergies







 Active Allergy  Reactions  Severity  Noted Date  Comments

 

 Penicillins  Rash  Low  2018  







Medications







 Medication  Sig  Dispensed  Refills  Start Date  End Date  Status

 

 metFORMIN  Take 1 mg by    0      Active



 (GLUCOPHAGE) 1,000  mouth 2 (two)          



 mg tablet  times a day.          

 

 simvastatin (ZOCOR)  Take 10 mg by    0      Active



 20 MG tablet  mouth nightly.          

 

 lurasidone (LATUDA)  Take 60 mg by    0      Active



 60 mg tablet  mouth daily.          

 

 desvenlafaxine  Take 100 mg by    0      Active



 (PRISTIQ) 100 MG 24  mouth daily.          



 hr tablet            

 

 topiramate (TOPAMAX)  Take 50 mg by    0      Active



 50 MG tablet  mouth 2 (two)          



   times a day.          

 

 metoprolol succinate  Take 100 mg by    0      Active



 XL (TOPROL-XL) 100  mouth daily.          



 mg 24 hr tablet            

 

 esomeprazole  Take 40 mg by    0      Active



 (NexIUM) 40 MG  mouth daily          



 capsule  before          



   breakfast.          

 

 ranitidine (ZANTAC)  Take 150 mg by    0      Active



 150 MG tablet  mouth nightly.          

 

 umeclidinium  Inhale 1 puff    0      Active



 brm/vilanterol tr  daily.          



 (ANORO ELLIPTA INHL)            

 

 polyethylene glycol  Take 17 g by    0      Active



 (MIRALAX) 17 gram  mouth daily.          



 packet            

 

 lisinopril  Take 1 tablet  30 tablet  0  2018    Active



 (PRINIVIL,ZESTRIL)  (20 mg total)          



 20 mg tablet  by mouth          



   nightly for 30          



   days.          

 

 amitriptyline  Take by mouth    0      Discontinued



 (ELAVIL) 75 MG  nightly.        8  



 tablet            

 

 levomefolate-algal  Take by mouth    0      Discontinued



 oil (DEPLIN, ALGAL  daily.        8  



 OIL,) 15-90.314 mg            



 capsule            

 

 furosemide (LASIX)  Take 20 mg by    0      Discontinued



 20 mg tablet  mouth daily.        8  

 

 lisinopril  Take 10 mg by    0      Discontinued



 (PRINIVIL,ZESTRIL)  mouth nightly.        8  



 10 mg tablet            

 

 sodium chloride 0.9%  by epidural    0      Discontinued



 parenteral solution  route        8  



 with morPHINE PF 1  continuously.          



 mg/mL solution,            



 bupivacaine (PF) 0.5            



 % (5 mg/mL) solution            



 0.0625 %, clonIDINE            



 (PF) 1,000 mcg/10 mL            



 (100 mcg/mL)            



 solution 1 mcg/mL            

 

 calcium carbonate  Chew 1 tablet  42 tablet  0  2018  



 (TUMS) 200 mg  (500 mg total)        8  



 calcium (500 mg)  3 (three)          



 chewable tablet  times a day          



   for 14 days.          

 

 docusate sodium  Take 1 capsule  60 capsule  0  2018  



 (COLACE) 100 MG  (100 mg total)        8  



 capsule  by mouth 2          



   (two) times a          



   day for 30          



   days.          

 

 gabapentin  Take 1 capsule  90 capsule  0  2018  



 (NEURONTIN) 100 mg  (100 mg total)        8  



 capsule  by mouth 3          



   (three) times          



   a day for 30          



   days.          

 

 pantoprazole  Take 1 tablet  60 tablet  0  2018  Discontinued



 (PROTONIX) 40 MG EC  (40 mg total)        8  



 tablet  by mouth 2          



   (two) times a          



   day for 30          



   days.          

 

 polyethylene glycol  Take 17 g by  30 packet  0  2018  
Discontinued



 (MIRALAX) 17 gram  mouth daily        8  



 packet  for 30 days.          

 

 senna (SENOKOT) 8.6  Take 2 tablets  60 tablet  0  2018  




 mg tablet  by mouth daily        8  



   with lunch for          



   30 days.          

 

 tiZANidine  Take 1 tablet  30 tablet  2  2018  



 (ZANAFLEX) 2 MG  (2 mg total)        8  



 tablet  by mouth every          



   6 (six) hours          



   as needed for          



   muscle spasms          



   for up to 30          



   days.          

 

 amLODIPine (NORVASC)  Take 1 tablet  30 tablet  0  2018  




 5 mg tablet  (5 mg total)        8  



   by mouth daily          



   for 30 days.          







Active Problems







 Problem  Noted Date

 

 Acute post-hemorrhagic anemia  2018

 

 S/P lumbar spinal fusion  2018

 

 Electrolyte and fluid disorder  2018

 

 Acute respiratory distress  2018

 

 Post-operative pain  2018

 

 Post-operative nausea and vomiting  2018

 

 Chronic pain  2018

 

 Obesity, Class III, BMI 40-49.9 (morbid obesity)  2018

 

 Acute blood loss anemia  2018

 

 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

 

 Scoliosis due to degenerative disease of spine in adult patient  2018







Encounters







 Date  Type  Specialty  Care Team  Description

 

 2019  Office Visit  Neurosurgery  Chris,  S/P lumbar spinal



       Perry BECERRIL MD  fusion (Primary Dx)

 

 2019  Hospital Encounter  Radiology  Ludy Perez MD  thoracolumbar spine,



         unspecified scoliosis



         type

 

 2019  Orders Only  Neurosurgery  Brush,  Lumbar radiculopathy, acute (
Primary Dx);



       Carmen, MA  Thoracic spine pain

 

 2019  Orders Only  Neurosurgery  Leufroy-Alea  Scoliosis of



       za, ,  thoracolumbar spine,



       MA  unspecified scoliosis



         type (Primary Dx)

 

 2018  Orders Only  Neurosurgery  Leufroy-Alea  Scoliosis of



       za, ,  thoracolumbar spine,



       MA  unspecified scoliosis



         type (Primary Dx)

 

 2018  Office Visit  Neurosurgery  Chris,  Scoliosis due to



       Perry BECERRIL MD  degenerative disease



         of spine in adult



         patient (Primary Dx)

 

 2018  Hospital Encounter  Radiology  Ludy Perez MD  thoracolumbar spine,



         unspecified scoliosis



         type

 

 2018  Transcribe Orders  Neurosurgery  Leufroy-Alea  Scoliosis of



       za, ,  thoracolumbar spine,



       MA  unspecified scoliosis



         type (Primary Dx)

 

 2018 -  Hospital Encounter  Rehabilitation  Tastard,  Scoliosis due to



 2018      Devika LOVE MD  degenerative disease



         of spine in adult



         patient (Primary Dx)

 

 2018  Anesthesia Event  General Surgery  Kendal Ryan MD  

 

 2018  Surgery  General Surgery  Chris,  L2-L3, L4-L5 LATERAL



       Perry BECERRIL MD  INTERBODY FUSION W/



         INSTRUMENTATION

 

 07/10/2018  Anesthesia Event  General Surgery  Berry Barclay,  



       APRN  

 

 07/10/2018  Surgery  General Surgery  Chris,  L5-S1 ANTERIOR LUMBAR



       Perry BECERRIL MD  INTERBODY FUSION W/



         INSTRUMENTATION.

 

 07/10/2018 -  Hospital Encounter  Neurosurgery  Chris,  Other secondary 
scoliosis;



 2018      Perry BECERRIL MD  Scoliosis of lumbar spine, unspecified scoliosis 
type

 

 2018  Hospital Encounter  Radiology  Chris,  Preop testing



       Perry BECERRIL MD  

 

 2018  Pre-Admit Testing  Pre-Admission Testing  Chris  Preop testing (
Primary



   Appointment    Perry BECERRIL MD  Dx)

 

 2018  Pre-Admit Testing  Pre-Admission Testing  Chris  Preop testing (
Primary



   Appointment    Perry BECERRIL MD  Dx)

 

 2018  Hospital Encounter  Radiology  Chris  Age-related



       Perry BECERRIL MD  osteoporosis with



         current pathological



         fracture, initial



         encounter

 

 2018  Hospital Encounter  Radiology  Chris  Age-related



       Perry BECERRIL MD  osteoporosis with



         current pathological



         fracture, initial



         encounter

 

 2018  Office Visit  Neurosurgery  Chris,  Scoliosis due to



       Perry BECERRIL MD  degenerative disease



         of spine in adult



         patient (Primary Dx)

 

 2018  Transcribe Orders  Neurosurgery  ChitoAlea  Age-related



       za, ,  osteoporosis with



       MA  current pathological



         fracture, initial



         encounter (Primary Dx)



after 2018



Family History







 Medical History  Relation  Name  Comments

 

 Cancer  Paternal Grandfather    HIGH BLOOD PRESSURE/DIABETES/STROKE/THYROID



       TOOLITTLE









 Relation  Name  Status  Comments

 

 Paternal Grandfather    Other  







Social History







 Tobacco Use  Types  Packs/Day  Years Used  Date

 

 Former Smoker    2  25  Quit: 









 Smokeless Tobacco: Never Used      









 Comments: QUITE 5 YEARS









 Alcohol Use  Drinks/Week  oz/Week  Comments

 

 No      









 Sex Assigned at Birth  Date Recorded

 

 Not on file  









 Job Start Date  Occupation  Industry

 

 Not on file  Not on file  Not on file









 Travel History  Travel Start  Travel End









 No recent travel history available.







Last Filed Vital Signs







 Vital Sign  Reading  Time Taken

 

 Blood Pressure  118/65  2018 10:49 AM CDT

 

 Pulse  82  2018 10:49 AM CDT

 

 Temperature  37.1 C (98.7 F)  2018 10:49 AM CDT

 

 Respiratory Rate  18  2018 10:49 AM CDT

 

 Oxygen Saturation  95%  2018 10:49 AM CDT

 

 Inhaled Oxygen Concentration  -  -

 

 Weight  117 kg (259 lb)  07/10/2018  6:56 AM CDT

 

 Height  170.2 cm (5' 7")  07/10/2018  6:56 AM CDT

 

 Body Mass Index  40.57  07/10/2018  6:56 AM CDT







Plan of Treatment







 Date  Type  Specialty  Care Team  Description

 

 2019  Appointment  Radiology  Perry Perez MD



  



       [address]



  



       [address]



  



       [place], [place] [address] [phone] (Fax)  

 

 2019  Appointment  Radiology  Perry Perez MD



  



       [address]



  



       [address]



  



       [place], [place] [address] [phone] (Fax)  

 

 2019  Office Visit  Neurosurgery  Perry Perez MD



  



       [address]



  



       [address]



  



       [place], [place] [address] [phone] (Fax)  









 Health Maintenance  Due Date  Last Done  Comments

 

 CERVICAL CANCER SCREENING  1982    

 

 BREAST CANCER SCREENING  2011    

 

 COLON CANCER SCREENING  2011    

 

 SHINGLES VACCINES (#1)  2011    

 

 INFLUENZA VACCINE  Completed  2019  







Implants







 Implanted  Type  Area    Device  Shelf  Model /



         Identifier  Expiration  Serial / Lot



           Date  

 

 Bone Matriz Osteocel Pro Large - [id number] - [id number]  Human Tissue  N/A:  
NUVASIVE    2023  [id number] /



 Implanted: Qty: 1 on 07/10/2018 by Perry Perez MD  Implants  N/A        
[id number] /



             [id number]

 

 Paste Dbm Easy-Dispensing Wdmth Syr 10ml [address] - [id number] - [id number]
  Human Tissue  Posteri  MEDTRONIC    2020  [id number] /



 Implanted: Qty: 1 on 2018 by Perry Perez MD  Implants  or: N/A  
SPINAL GRAFT      [id number] /



       TECHNOLOGIES      [id number]

 

 Paste Dbm Easy-Dispensing Wdmth Syr 10ml [address] - [id number] - [id number]
  Human Tissue  Posteri  MEDTRONIC    2020  [id number] /



 Implanted: Qty: 1 on 2018 by Perry Perez MD  Implants  or: N/A  
SPINAL GRAFT      [id number] /



       TECHNOLOGIES      [id number]

 

 Bone Cancellous 30ml Chips - [id number] - [id number]  Human Tissue  Posteri  
RTI SURGICAL    2022  [id number] /



 Implanted: Qty: 1 on 2018 by Perry Perez MD  Implants  or: N/A  
INC.      [id number] /



             [id number]

 

 Bone Cancellous 30ml Chips - [id number] - [id number]  Human Tissue  Posteri  
RTI SURGICAL    2022  [id number] /



 Implanted: Qty: 1 on 2018 by Perry Perez MD  Implants  or: N/A  
INC.      [id number] /



             [id number]

 

 Bone Matriz Osteocel Pro Large - [id number] - [id number]  Human Tissue  N/A:  
NUVASIVE    2023  [id number] /



 Implanted: Qty: 1 on 2018 by Perry Perez MD  Implants  N/A        
[id number] /



             [id number]

 

 Kit Bone Grft Lmbr Tprd 8ml Xxl Infuse - [id number]  Human Tissue  Posteri  
MEDTRONIC    2019  [id number] /



 Implanted: Qty: 1 on 2018 by Perry Perez MD  Implants  or: N/A  
SPINAL AND       /



       BIOLOGICS      [id number]

 

 Kit Bone Grft Lmbr Tprd 8ml Xxl Infuse - [id number]  Human Tissue  Posteri  
MEDTRONIC    2019  [id number] /



 Implanted: Qty: 1 on 2018 by Perry Perez MD  Implants  or: N/A  
SPINAL AND       /



       BIOLOGICS      [id number]

 

 Maxcess 4 Surgical Access Kit - [id number]  IPM IMPLANT  N/A:  NUVASIVE SPINE 
     [id number] /



 Implanted: Qty: 1 on 07/10/2018 by Perry Perez MD  DEVICES  N/A         /

 

 Base Ti Imp, [id number]  10               - [id number]  IPM IMPLANT  N/A:  
NUVASIVE SPINE      [id number] /



 Implanted: Qty: 1 on 07/10/2018 by Perry Perez MD  DEVICES  N/A         /

 

 Base Ti [place] 6.0 X 17.5mm Variable - [id number]  IPM IMPLANT  N/A:  NUVASIVE 
SPINE      [id number] /



 Implanted: Qty: 1 on 07/10/2018 by Perry Perez MD  DEVICES  N/A         /

 

 Modulus Xlw, [id number] 15deg - [id number]  IPM IMPLANT  N/A:  NUVASIVE SPINE 
   2023  [id number] /



 Implanted: Qty: 1 on 2018 by Perry Perez MD  DEVICES  N/A         /



             [id number]

 

 Modulus Xlw, [id number] 15deg - [id number]  IPM IMPLANT  N/A:  NUVASIVE SPINE 
   2023  [id number] /



 Implanted: Qty: 1 on 2018 by Perry Perez MD  DEVICES  N/A         /



             [id number]

 

 Maxcess 4 Surgical Access Kit - [id number]  IPM IMPLANT  N/A:  NUVASIVE SPINE 
     [id number] /



 Implanted: 2018 (Quantity not on file)  DEVICES  N/A         /

 

 Unid Patient Specific Marty 5.5 - [id number]  IPM IMPLANT  N/A:  MEDICREA INT'L 
   10/13/2018  [id number] /



 Implanted: 2018 (Quantity not on file)  DEVICES  N/A         /



             [id number]

 

 Screw [id number] Bs Cnmas 7.5x80 T/C - [id number]  IPM IMPLANT  Posteri  
MEDTRONIC      [id number] /



 Implanted: Qty: 1 on 2018 by Perry Perez MD  DEVICES  or: N/A  
SOFAMOR DANEK       /

 

 Screw [id number] 5.5 Mas 7.5x100 Cc - [id number]  IPM IMPLANT  Posteri  
MEDTRONIC      [id number] /



 Implanted: Qty: 1 on 2018 by Perry Perez MD  DEVICES  or: N/A  
SOFAMOR DANEK       /

 

 Screw [id number] 5.5 Mas 5.5x35 Cc - [id number]  IPM IMPLANT  Posteri  
MEDTRONIC      [id number] /



 Implanted: Qty: 4 on 2018 by Perry Perez MD  DEVICES  or: N/A  
SOFAMOR DANEK       /

 

 Screw [id number] 5.5 Mas 5.5x40 Cc - [id number]  IPM IMPLANT  Posteri  
MEDTRONIC      [id number] /



 Implanted: Qty: 3 on 2018 by Perry Perez MD  DEVICES  or: N/A  
SOFAMOR DANEK       /

 

 Screw [id number] 5.5 Mas 5.5x45 Cc - [id number]  IPM IMPLANT  Posteri  
[employer]TRONIC      [id number] /



 Implanted: Qty: 12 on 2018 by Perry Perez MD  DEVICES  or: N/A  
SOFAMOR DANEK       /

 

 Screw [id number] 5.5 Mas 5.5x50 Cc - [id number]  IPM IMPLANT  Posteri  
MEDTRONIC      [id number] /



 Implanted: Qty: 2 on 2018 by Perry Perez MD  DEVICES  or: N/A  
SOFAMOR DANEK       /

 

 Screw [id number] 5.5 Mas 6.5x50 Cc - [id number]  IPM IMPLANT  Posteri  
MEDTRONIC      [id number] /



 Implanted: Qty: 1 on 2018 by Perry Perez MD  DEVICES  or: N/A  
SOFAMOR DANEK       /

 

 Screw [id number] 5.5 Mas 6.5x45 Cc - [id number]  IPM IMPLANT  Posteri  
MEDTRONIC      [id number] /



 Implanted: Qty: 3 on 2018 by Perry Perez MD  DEVICES  or: N/A  
SOFAMOR DANEK       /

 

 Screw [id number] 5.5 Mas 7.5x40 Cc - [id number]  IPM IMPLANT  Posteri  
MEDTRONIC      [id number] /



 Implanted: Qty: 1 on 2018 by Perry Perez MD  DEVICES  or: N/A  
SOFAMOR DANEK       /

 

 Screw [id number] 5.5 Mas 7.5x35 Cc - [id number]  IPM IMPLANT  Posteri  
MEDTRONIC      [id number] /



 Implanted: Qty: 1 on 2018 by Perry Perez MD  DEVICES  or: N/A  
SOFAMOR DANEK       /

 

 Screw [id number] 5.5 Mas 8.5x50 Cc - [id number]  IPM IMPLANT  Posteri  
MEDTRONIC      [id number] /



 Implanted: Qty: 1 on 2018 by Perry Perez MD  DEVICES  or: N/A  
SOFAMOR DANEK       /

 

 Screw [id number] 5.5 Mas 8.5x40 Cc - [id number]  IPM IMPLANT  Posteri  
[employer]TRONIC      [id number] /



 Implanted: Qty: 1 on 2018 by Perry Perez MD  DEVICES  or: N/A  
SOFAMOR DANEK       /

 

 Screw [id number] 5.5 Mas 6.5x50 Cc - [id number]  IPM IMPLANT  Posteri  
MEDTRONIC      [id number] /



 Implanted: Qty: 1 on 2018 by Perry Perez MD  DEVICES  or: N/A  
SOFAMOR DANEK       /

 

 Connector [id number] 5/6-6.0 Clsd Lat 20 - [id number]  IPM IMPLANT  Posteri  
MEDTRONIC      [id number] /



 Implanted: Qty: 1 on 2018 by Perry Perez MD  DEVICES  or: N/A  
SOFAMOR DANEK       /

 

 Connector [id number] 5/6-6.0 Clsd Lat 25 - [id number]  IPM IMPLANT  Posteri  
MEDTRONIC      [id number] /



 Implanted: Qty: 1 on 2018 by Perry Perez MD  DEVICES  or: N/A  
SOFAMOR DANEK       /

 

 Material Bone Hmsts Wtrsolbl 2.5g [place] - [id number]  Orthopedic  N/A:      10
/  [id number] /



 Implanted: Qty: 1 on 07/10/2018 by Perry Perez MD  Trauma  N/A         /



   Implants          [id number]

 

 Material Bone Hmsts Wtrsolbl 2.5g [place] - [id number]  Orthopedic  N/A:      10
/  [id number] /



 Implanted: Qty: 1 on 07/10/2018 by Prery Perez MD  Trauma  N/A         /



   Implants          [id number]

 

 Material Bone Hmsts Wtrsolbl 2.5g [place] - [id number]  Orthopedic  N/A:      10
/  [id number] /



 Implanted: Qty: 1 on 2018 by Perry Perez MD  Trauma  N/A         /



   Implants          [id number]

 

 Material Bone Hmsts Wtrsolbl 2.5g [place] - [id number]  Orthopedic  N/A:      10
/  [id number] /



 Implanted: Qty: 1 on 2018 by Perry Perez MD  Trauma  N/A         /



   Implants          [id number]

 

 Screw Bone Canc Lag 4x30mm Ns - [id number]  Orthopedic  Posteri  [employer]TRONIC    
  [id number] /



 Implanted: Qty: 32 on 2018 by Perry Perez MD  Trauma  or: N/A  
SOFAMOR DANEK       /



   Implants          

 

 Kit Dil M5 Xlif - [id number]  Spinal  N/A:  NUVASIVE      [id number] /



 Implanted: Qty: 1 on 07/10/2018 by Perry Perez MD  Implants  N/A         /

 

 Kit Dil M5 Xlif - [id number]  Spinal  N/A:  NUVASIVE      [id number] /



 Implanted: 2018 (Quantity not on file)  Implants  N/A         /

 

 Screw Set Std Ti 1/4in 32mm - [id number]  Spinal  Posteri  [employer]IC      
[id number] /



 Implanted: Qty: 2 on 2018 by Perry Perez MD  Implants  or: N/A  
SPINAL AND       /



       BIOLOGICS      







Procedures







 Procedure Name  Priority  Date/Time  Associated Diagnosis  Comments

 

 XR SPINE SCOLIOSIS 2-3  Routine  2019  2:09  Scoliosis of  Results for 
this



 VIEWS    PM CST  thoracolumbar spine,  procedure are in



       unspecified scoliosis  the results



       type  section.

 

 TRANSFUSE RED BLOOD  Routine  2018  5:55    



 CELLS    PM CDT    

 

 XR SPINE SCOLIOSIS 2-3  Routine  2018 12:14  Scoliosis of  Results for 
this



 VIEWS    PM CDT  thoracolumbar spine,  procedure are in



       unspecified scoliosis  the results



       type  section.

 

 POC GLUCOSE  Routine  2018  7:13    Results for this



     AM CDT    procedure are in



         the results



         section.

 

 POC GLUCOSE  Routine  2018  7:40    Results for this



     AM CDT    procedure are in



         the results



         section.

 

 POC GLUCOSE  Routine  2018  6:42    Results for this



     AM CDT    procedure are in



         the results



         section.

 

 POC GLUCOSE  Routine  2018  6:33    Results for this



     AM CDT    procedure are in



         the results



         section.

 

 POC GLUCOSE  Routine  2018  8:34    Results for this



     PM CDT    procedure are in



         the results



         section.

 

 POC GLUCOSE  Routine  2018  6:22    Results for this



     AM CDT    procedure are in



         the results



         section.

 

 ZZESTIMATED GFR  Routine  2018  4:00    Results for this



     AM CDT    procedure are in



         the results



         section.

 

 BASIC METABOLIC PANEL  Routine  2018  4:00    Results for this



     AM CDT    procedure are in



         the results



         section.

 

 POC GLUCOSE  Routine  2018  6:46    Results for this



     AM CDT    procedure are in



         the results



         section.

 

 POC GLUCOSE  Routine  2018  5:50    Results for this



     PM CDT    procedure are in



         the results



         section.

 

 POC GLUCOSE  Routine  2018  6:28    Results for this



     AM CDT    procedure are in



         the results



         section.

 

 SMEAR REVIEW  Routine  2018  5:00    Results for this



     AM CDT    procedure are in



         the results



         section.

 

 ZZESTIMATED GFR  Routine  2018  5:00    Results for this



     AM CDT    procedure are in



         the results



         section.

 

 THYROID STIMULATING  Routine  2018  5:00    Results for this



 HORMONE    AM CDT    procedure are in



         the results



         section.

 

 HC COMPLETE BLD COUNT  Routine  2018  5:00    Results for this



 W/AUTO DIFF    AM CDT    procedure are in



         the results



         section.

 

 BASIC METABOLIC PANEL  Routine  2018  5:00    Results for this



     AM CDT    procedure are in



         the results



         section.

 

 URINALYSIS SCREEN AND  Routine  2018  9:16    Results for this



 MICROSCOPY, WITH    PM CDT    procedure are in



 REFLEX TO CULTURE        the results



         section.

 

 URINE CULTURE  Routine  2018  9:16    Results for this



     PM CDT    procedure are in



         the results



         section.

 

 POC GLUCOSE  Routine  2018  9:10    Results for this



     PM CDT    procedure are in



         the results



         section.

 

 NM LUNG VENTILATION  STAT  2018  8:29    Results for this



 PERFUSION    PM CDT    procedure are in



         the results



         section.

 

 XR CHEST 1 VW PORTABLE  STAT  2018  5:11    Results for this



     PM CDT    procedure are in



         the results



         section.

 

 POC GLUCOSE  Routine  2018  4:59    Results for this



     PM CDT    procedure are in



         the results



         section.

 

 ZZESTIMATED GFR  Routine  2018 12:55    Results for this



     PM CDT    procedure are in



         the results



         section.

 

 COMPREHENSIVE  Routine  2018 12:55    Results for this



 METABOLIC PANEL    PM CDT    procedure are in



         the results



         section.

 

 HC COMPLETE BLD COUNT  Routine  2018 12:55    Results for this



 W/AUTO DIFF    PM CDT    procedure are in



         the results



         section.

 

 POC GLUCOSE  Routine  2018 11:07    Results for this



     AM CDT    procedure are in



         the results



         section.

 

 POC GLUCOSE  Routine  2018  6:43    Results for this



     AM CDT    procedure are in



         the results



         section.

 

 POC GLUCOSE  Routine  2018  9:08    Results for this



     PM CDT    procedure are in



         the results



         section.

 

 POC GLUCOSE  Routine  2018  5:00    Results for this



     PM CDT    procedure are in



         the results



         section.

 

 POC GLUCOSE  Routine  2018 11:16    Results for this



     AM CDT    procedure are in



         the results



         section.

 

 POC GLUCOSE  Routine  2018  6:36    Results for this



     AM CDT    procedure are in



         the results



         section.

 

 POC GLUCOSE  Routine  2018  8:43    Results for this



     PM CDT    procedure are in



         the results



         section.

 

 POC GLUCOSE  Routine  2018  4:55    Results for this



     PM CDT    procedure are in



         the results



         section.

 

 POC GLUCOSE  Routine  2018 11:07    Results for this



     AM CDT    procedure are in



         the results



         section.

 

 HEMOGLOBIN A1C  Routine  2018  7:02    Results for this



     AM CDT    procedure are in



         the results



         section.

 

 HC COMPLETE BLD COUNT  Routine  2018  7:02    Results for this



 W/AUTO DIFF    AM CDT    procedure are in



         the results



         section.

 

 POC GLUCOSE  Routine  2018  6:23    Results for this



     AM CDT    procedure are in



         the results



         section.

 

 POC GLUCOSE  Routine  2018  8:59    Results for this



     PM CDT    procedure are in



         the results



         section.

 

 URINALYSIS, AUTOMATED  Routine  2018  8:30    Results for this



 WITH MICROSCOPY    PM CDT    procedure are in



         the results



         section.

 

 ZZESTIMATED GFR  Routine  2018  7:45    Results for this



     PM CDT    procedure are in



         the results



         section.

 

 PREALBUMIN LEVEL  Routine  2018  7:45    Results for this



     PM CDT    procedure are in



         the results



         section.

 

 PHOSPHORUS LEVEL  Routine  2018  7:45    Results for this



     PM CDT    procedure are in



         the results



         section.

 

 MAGNESIUM LEVEL  Routine  2018  7:45    Results for this



     PM CDT    procedure are in



         the results



         section.

 

 COMPREHENSIVE  Routine  2018  7:45    Results for this



 METABOLIC PANEL    PM CDT    procedure are in



         the results



         section.

 

 POC GLUCOSE  Routine  2018  4:45    Results for this



     PM CDT    procedure are in



         the results



         section.

 

 POC GLUCOSE  Routine  2018 11:30    Results for this



     AM CDT    procedure are in



         the results



         section.

 

 POTASSIUM LEVEL  Routine  2018  8:22    Results for this



     AM CDT    procedure are in



         the results



         section.

 

 POC GLUCOSE  Routine  2018  7:24    Results for this



     AM CDT    procedure are in



         the results



         section.

 

 POC GLUCOSE  Routine  2018  9:43    Results for this



     PM CDT    procedure are in



         the results



         section.

 

 POC GLUCOSE  Routine  2018  5:21    Results for this



     PM CDT    procedure are in



         the results



         section.

 

 POC GLUCOSE  Routine  2018 11:29    Results for this



     AM CDT    procedure are in



         the results



         section.

 

 ZZESTIMATED GFR  Routine  2018  3:35    Results for this



     AM CDT    procedure are in



         the results



         section.

 

 HC COMPLETE BLD COUNT  Routine  2018  3:35    Results for this



 W/AUTO DIFF    AM CDT    procedure are in



         the results



         section.

 

 MAGNESIUM LEVEL  Routine  2018  3:35    Results for this



     AM CDT    procedure are in



         the results



         section.

 

 BASIC METABOLIC PANEL  Routine  2018  3:35    Results for this



     AM CDT    procedure are in



         the results



         section.

 

 POC GLUCOSE  Routine  2018  8:10    Results for this



     PM CDT    procedure are in



         the results



         section.

 

 POC GLUCOSE  Routine  2018  3:55    Results for this



     PM CDT    procedure are in



         the results



         section.

 

 POC GLUCOSE  Routine  2018 11:24    Results for this



     AM CDT    procedure are in



         the results



         section.

 

 POC GLUCOSE  Routine  2018  7:54    Results for this



     AM CDT    procedure are in



         the results



         section.

 

 HC COMPLETE BLD COUNT  Routine  2018  6:35    Results for this



 W/AUTO DIFF    AM CDT    procedure are in



         the results



         section.

 

 ZZESTIMATED GFR  Routine  2018  4:00    Results for this



     AM CDT    procedure are in



         the results



         section.

 

 BASIC METABOLIC PANEL  Routine  2018  4:00    Results for this



     AM CDT    procedure are in



         the results



         section.

 

 POC GLUCOSE  Routine  2018 10:20    Results for this



     PM CDT    procedure are in



         the results



         section.

 

 POC GLUCOSE  Routine  2018  4:34    Results for this



     PM CDT    procedure are in



         the results



         section.

 

 POC GLUCOSE  Routine  2018 12:06    Results for this



     PM CDT    procedure are in



         the results



         section.

 

 POC GLUCOSE  Routine  2018  8:13    Results for this



     AM CDT    procedure are in



         the results



         section.

 

 ZZESTIMATED GFR  Routine  2018  4:00    Results for this



     AM CDT    procedure are in



         the results



         section.

 

 BASIC METABOLIC PANEL  Routine  2018  4:00    Results for this



     AM CDT    procedure are in



         the results



         section.

 

 POC GLUCOSE  Routine  2018 10:01    Results for this



     PM CDT    procedure are in



         the results



         section.

 

 POC GLUCOSE  Routine  2018  4:36    Results for this



     PM CDT    procedure are in



         the results



         section.

 

 ZZESTIMATED GFR  Timed  2018  2:35    Results for this



     PM CDT    procedure are in



         the results



         section.

 

 BASIC METABOLIC PANEL  Timed  2018  2:35    Results for this



     PM CDT    procedure are in



         the results



         section.

 

 POC GLUCOSE  Routine  2018 12:05    Results for this



     PM CDT    procedure are in



         the results



         section.

 

 POC GLUCOSE  Routine  2018  7:59    Results for this



     AM CDT    procedure are in



         the results



         section.

 

 CBC HEMOGRAM  Routine  2018  3:25    Results for this



     AM CDT    procedure are in



         the results



         section.

 

 ZZESTIMATED GFR  Routine  2018  3:25    Results for this



     AM CDT    procedure are in



         the results



         section.

 

 BASIC METABOLIC PANEL  Routine  2018  3:25    Results for this



     AM CDT    procedure are in



         the results



         section.

 

 POC GLUCOSE  Routine  2018  9:05    Results for this



     PM CDT    procedure are in



         the results



         section.

 

 POC GLUCOSE  Routine  2018  4:08    Results for this



     PM CDT    procedure are in



         the results



         section.

 

 POC GLUCOSE  Routine  2018 11:40    Results for this



     AM CDT    procedure are in



         the results



         section.

 

 VANCOMYCIN LEVEL,  Timed  2018  9:26    Results for this



 TROUGH    AM CDT    procedure are in



         the results



         section.

 

 POC GLUCOSE  Routine  2018  8:20    Results for this



     AM CDT    procedure are in



         the results



         section.

 

 POC GLUCOSE  Routine  2018  4:38    Results for this



     AM CDT    procedure are in



         the results



         section.

 

 ZZESTIMATED GFR  Routine  2018  4:00    Results for this



     AM CDT    procedure are in



         the results



         section.

 

 BASIC METABOLIC PANEL  Routine  2018  4:00    Results for this



     AM CDT    procedure are in



         the results



         section.

 

 POC GLUCOSE  Routine  2018 11:50    Results for this



     PM CDT    procedure are in



         the results



         section.

 

 POC GLUCOSE  Routine  2018  9:09    Results for this



     PM CDT    procedure are in



         the results



         section.

 

 POC GLUCOSE  Routine  2018  5:04    Results for this



     PM CDT    procedure are in



         the results



         section.

 

 ECG 12-LEAD  STAT  2018  3:34    Results for this



     PM CDT    procedure are in



         the results



         section.

 

 TROPONIN  STAT  2018  3:04    Results for this



     PM CDT    procedure are in



         the results



         section.

 

 ZZESTIMATED GFR  Routine  2018  3:04    Results for this



     PM CDT    procedure are in



         the results



         section.

 

 BASIC METABOLIC PANEL  Routine  2018  3:04    Results for this



     PM CDT    procedure are in



         the results



         section.

 

 HC COMPLETE BLD COUNT  Routine  2018  3:04    Results for this



 W/AUTO DIFF    PM CDT    procedure are in



         the results



         section.

 

 POC GLUCOSE  Routine  2018 11:55    Results for this



     AM CDT    procedure are in



         the results



         section.

 

 POC GLUCOSE  Routine  2018  8:23    Results for this



     AM CDT    procedure are in



         the results



         section.

 

 POC GLUCOSE  Routine  2018  5:43    Results for this



     AM CDT    procedure are in



         the results



         section.

 

 POC GLUCOSE  Routine  2018 10:45    Results for this



     PM CDT    procedure are in



         the results



         section.

 

 POC GLUCOSE  Routine  2018  8:15    Results for this



     PM CDT    procedure are in



         the results



         section.

 

 VANCOMYCIN LEVEL,  Routine  2018  5:07    Results for this



 RANDOM    PM CDT    procedure are in



         the results



         section.

 

 POC GLUCOSE  Routine  2018  5:02    Results for this



     PM CDT    procedure are in



         the results



         section.

 

 POC GLUCOSE  Routine  2018 11:51    Results for this



     AM CDT    procedure are in



         the results



         section.

 

 POTASSIUM LEVEL  Routine  2018 11:26    Results for this



     AM CDT    procedure are in



         the results



         section.

 

 XR CHEST 1 VW PORTABLE  Timed  2018 10:14    Results for this



     AM CDT    procedure are in



         the results



         section.

 

 POC GLUCOSE  Routine  2018  7:58    Results for this



     AM CDT    procedure are in



         the results



         section.

 

 ZZESTIMATED GFR  Routine  2018  5:12    Results for this



     AM CDT    procedure are in



         the results



         section.

 

 PHOSPHORUS LEVEL  Routine  2018  5:12    Results for this



     AM CDT    procedure are in



         the results



         section.

 

 MAGNESIUM LEVEL  Routine  2018  5:12    Results for this



     AM CDT    procedure are in



         the results



         section.

 

 IONIZED CALCIUM  Routine  2018  5:12    Results for this



     AM CDT    procedure are in



         the results



         section.

 

 CBC HEMOGRAM  Routine  2018  5:12    Results for this



     AM CDT    procedure are in



         the results



         section.

 

 BASIC METABOLIC PANEL  Routine  2018  5:12    Results for this



     AM CDT    procedure are in



         the results



         section.

 

 POC GLUCOSE  Routine  2018 12:34    Results for this



     AM CDT    procedure are in



         the results



         section.

 

 POC GLUCOSE  Routine  2018  8:53    Results for this



     PM CDT    procedure are in



         the results



         section.

 

 POC GLUCOSE  Routine  2018  4:00    Results for this



     PM CDT    procedure are in



         the results



         section.

 

 POC GLUCOSE  Routine  2018 11:26    Results for this



     AM CDT    procedure are in



         the results



         section.

 

 XR PICC CHEST PORTABLE  Routine  2018 11:18  Scoliosis of lumbar  
Results for this



     AM CDT  spine, unspecified  procedure are in



       scoliosis type  the results



         section.

 

 HC CATH DUAL LUMEN  Routine  2018 10:34    Results for this



 PICC    AM CDT    procedure are in



         the results



         section.

 

 HC US GUIDED VASCULAR  Routine  2018 10:34    Results for this



 ACCESS    AM CDT    procedure are in



         the results



         section.

 

 HC CVL PICC INSERT 5  Routine  2018 10:34    Results for this



 YRS OR > W/O IMG GUID    AM CDT    procedure are in



         the results



         section.

 

 INTRAOPERATIVE  Routine  2018  7:24    



 MONITORING    AM CDT    

 

 POC GLUCOSE  Routine  2018  7:11    Results for this



     AM CDT    procedure are in



         the results



         section.

 

 PREPARE RBC  Timed  2018  6:40    



     AM CDT    

 

 PREPARE RBC  Timed  2018  6:40    Results for this



     AM CDT    procedure are in



         the results



         section.

 

 TYPE AND SCREEN  Timed  2018  6:40    Results for this



     AM CDT    procedure are in



         the results



         section.

 

 ZZESTIMATED GFR  Routine  2018  4:02    Results for this



     AM CDT    procedure are in



         the results



         section.

 

 MAGNESIUM LEVEL  Routine  2018  4:02    Results for this



     AM CDT    procedure are in



         the results



         section.

 

 PHOSPHORUS LEVEL  Routine  2018  4:02    Results for this



     AM CDT    procedure are in



         the results



         section.

 

 COMPREHENSIVE  Routine  2018  4:02    Results for this



 METABOLIC PANEL    AM CDT    procedure are in



         the results



         section.

 

 HC COMPLETE BLD COUNT  Routine  2018  3:50    Results for this



 W/AUTO DIFF    AM CDT    procedure are in



         the results



         section.

 

 POC GLUCOSE  Routine  2018  3:37    Results for this



     AM CDT    procedure are in



         the results



         section.

 

 POC GLUCOSE  Routine  07/15/2018 11:44    Results for this



     PM CDT    procedure are in



         the results



         section.

 

 ZZESTIMATED GFR  Timed  07/15/2018  8:00    Results for this



     PM CDT    procedure are in



         the results



         section.

 

 BASIC METABOLIC PANEL  Timed  07/15/2018  8:00    Results for this



     PM CDT    procedure are in



         the results



         section.

 

 POC GLUCOSE  Routine  07/15/2018  7:32    Results for this



     PM CDT    procedure are in



         the results



         section.

 

 US HEPATIC  Routine  07/15/2018  4:00    Results for this



     PM CDT    procedure are in



         the results



         section.

 

 POC GLUCOSE  Routine  07/15/2018  3:25    Results for this



     PM CDT    procedure are in



         the results



         section.

 

 ECG 12-LEAD  Routine  07/15/2018  2:40    Results for this



     PM CDT    procedure are in



         the results



         section.

 

 XR ABDOMEN 1 VW  STAT  07/15/2018 12:38    Results for this



 PORTABLE    PM CDT    procedure are in



         the results



         section.

 

 POC GLUCOSE  Routine  07/15/2018 11:30    Results for this



     AM CDT    procedure are in



         the results



         section.

 

 ZZESTIMATED GFR  Routine  07/15/2018 10:20    Results for this



     AM CDT    procedure are in



         the results



         section.

 

 ARTERIAL BLOOD GAS  Routine  07/15/2018 10:20    Results for this



     AM CDT    procedure are in



         the results



         section.

 

 BASIC METABOLIC PANEL  Routine  07/15/2018 10:20    Results for this



     AM CDT    procedure are in



         the results



         section.

 

 US DUPLEX VENOUS LOWER  STAT  07/15/2018  8:28    Results for this



 EXTREMITY BILATERAL    AM CDT    procedure are in



         the results



         section.

 

 POC GLUCOSE  Routine  07/15/2018  7:00    Results for this



     AM CDT    procedure are in



         the results



         section.

 

 ARTERIAL BLOOD GAS  Routine  07/15/2018  6:37    Results for this



     AM CDT    procedure are in



         the results



         section.

 

 XR CHEST 1 VW PORTABLE  STAT  07/15/2018  5:08    Results for this



     AM CDT    procedure are in



         the results



         section.

 

 ARTERIAL BLOOD GAS  STAT  07/15/2018  5:00    Results for this



     AM CDT    procedure are in



         the results



         section.

 

 CT ANGIOGRAM PE CHEST  STAT  07/15/2018  4:52    Results for this



     AM CDT    procedure are in



         the results



         section.

 

 POC GLUCOSE  Routine  07/15/2018  3:52    Results for this



     AM CDT    procedure are in



         the results



         section.

 

 POTASSIUM LEVEL  STAT  07/15/2018  3:35    Results for this



     AM CDT    procedure are in



         the results



         section.

 

 PHOSPHORUS LEVEL  STAT  07/15/2018  3:35    Results for this



     AM CDT    procedure are in



         the results



         section.

 

 BLOOD CULTURE, AEROBIC  Routine  07/15/2018  2:00    Results for this



 & ANAEROBIC    AM CDT    procedure are in



         the results



         section.

 

 BLOOD CULTURE, AEROBIC  Routine  07/15/2018  1:00    Results for this



 & ANAEROBIC    AM CDT    procedure are in



         the results



         section.

 

 MANUAL DIFFERENTIAL  Routine  07/15/2018 12:10    Results for this



     AM CDT    procedure are in



         the results



         section.

 

 CBC WITH PLATELET AND  Routine  07/15/2018 12:10    Results for this



 DIFFERENTIAL    AM CDT    procedure are in



         the results



         section.

 

 ZZESTIMATED GFR  Routine  07/15/2018 12:03    Results for this



     AM CDT    procedure are in



         the results



         section.

 

 IONIZED CALCIUM  Routine  07/15/2018 12:03    Results for this



     AM CDT    procedure are in



         the results



         section.

 

 PHOSPHORUS LEVEL  Routine  07/15/2018 12:03    Results for this



     AM CDT    procedure are in



         the results



         section.

 

 MAGNESIUM LEVEL  Routine  07/15/2018 12:03    Results for this



     AM CDT    procedure are in



         the results



         section.

 

 COMPREHENSIVE  Routine  07/15/2018 12:03    Results for this



 METABOLIC PANEL    AM CDT    procedure are in



         the results



         section.

 

 ARTERIAL BLOOD GAS  STAT  07/15/2018 12:00    Results for this



     AM CDT    procedure are in



         the results



         section.

 

 POC GLUCOSE  Routine  2018 11:59    Results for this



     PM CDT    procedure are in



         the results



         section.

 

 SMEAR REVIEW  STAT  2018  9:45    Results for this



     PM CDT    procedure are in



         the results



         section.

 

 HC COMPLETE BLD COUNT  STAT  2018  9:45    Results for this



 W/AUTO DIFF    PM CDT    procedure are in



         the results



         section.

 

 TRANSFUSE RED BLOOD  Routine  2018  9:32    



 CELLS    PM CDT    

 

 POC GLUCOSE  Routine  2018  8:06    Results for this



     PM CDT    procedure are in



         the results



         section.

 

 TRANSFUSE RED BLOOD  Routine  2018  6:45    



 CELLS    PM CDT    

 

 HEMOGLOBIN &  Routine  2018  4:00    Results for this



 HEMATOCRIT    PM CDT    procedure are in



         the results



         section.

 

 POC GLUCOSE  Routine  2018  3:39    Results for this



     PM CDT    procedure are in



         the results



         section.

 

 POC GLUCOSE  Routine  2018 11:23    Results for this



     AM CDT    procedure are in



         the results



         section.

 

 XR CHEST 1 VW PORTABLE  Routine  2018  9:03    Results for this



     AM CDT    procedure are in



         the results



         section.

 

 POC GLUCOSE  Routine  2018  8:18    Results for this



     AM CDT    procedure are in



         the results



         section.

 

 POC GLUCOSE  Routine  2018  5:21    Results for this



     AM CDT    procedure are in



         the results



         section.

 

 SMEAR REVIEW  Routine  2018  3:15    Results for this



     AM CDT    procedure are in



         the results



         section.

 

 HC COMPLETE BLD COUNT  Routine  2018  3:15    Results for this



 W/AUTO DIFF    AM CDT    procedure are in



         the results



         section.

 

 POC GLUCOSE  Routine  2018 12:00    Results for this



     AM CDT    procedure are in



         the results



         section.

 

 ZZESTIMATED GFR  Routine  2018 12:00    Results for this



     AM CDT    procedure are in



         the results



         section.

 

 BASIC METABOLIC PANEL  Routine  2018 12:00    Results for this



     AM CDT    procedure are in



         the results



         section.

 

 ECG 12-LEAD  STAT  2018 11:15    Results for this



     PM CDT    procedure are in



         the results



         section.

 

 SMEAR REVIEW  STAT  2018 11:00    Results for this



     PM CDT    procedure are in



         the results



         section.

 

 HC COMPLETE BLD COUNT  STAT  2018 11:00    Results for this



 W/AUTO DIFF    PM CDT    procedure are in



         the results



         section.

 

 ARTERIAL BLOOD GAS  STAT  2018 10:35    Results for this



     PM CDT    procedure are in



         the results



         section.

 

 ZZESTIMATED GFR  STAT  2018 10:30    Results for this



     PM CDT    procedure are in



         the results



         section.

 

 PHOSPHORUS LEVEL  STAT  2018 10:30    Results for this



     PM CDT    procedure are in



         the results



         section.

 

 MAGNESIUM LEVEL  STAT  2018 10:30    Results for this



     PM CDT    procedure are in



         the results



         section.

 

 BASIC METABOLIC PANEL  STAT  2018 10:30    Results for this



     PM CDT    procedure are in



         the results



         section.

 

 POC GLUCOSE  Routine  2018 10:09    Results for this



     PM CDT    procedure are in



         the results



         section.

 

 CT HEAD WO CONTRAST  STAT  2018  9:08    Results for this



     PM CDT    procedure are in



         the results



         section.

 

 ARTERIAL BLOOD GAS  Routine  2018  7:59    Results for this



     PM CDT    procedure are in



         the results



         section.

 

 SMEAR REVIEW  Routine  2018  6:45    Results for this



     PM CDT    procedure are in



         the results



         section.

 

 ZZESTIMATED GFR  Routine  2018  6:45    Results for this



     PM CDT    procedure are in



         the results



         section.

 

 BASIC METABOLIC PANEL  Routine  2018  6:45    Results for this



     PM CDT    procedure are in



         the results



         section.

 

 HC COMPLETE BLD COUNT  Routine  2018  6:45    Results for this



 W/AUTO DIFF    PM CDT    procedure are in



         the results



         section.

 

 MAGNESIUM LEVEL  STAT  2018  5:05    Results for this



     PM CDT    procedure are in



         the results



         section.

 

 LACTIC ACID, SYRINGE  STAT  2018  5:05    Results for this



     PM CDT    procedure are in



         the results



         section.

 

 IONIZED CALCIUM,  STAT  2018  5:05    Results for this



 ARTERIAL    PM CDT    procedure are in



         the results



         section.

 

 GLUCOSE LEVEL, SYRINGE  STAT  2018  5:05    Results for this



     PM CDT    procedure are in



         the results



         section.

 

 POTASSIUM, SYRINGE  STAT  2018  5:05    Results for this



     PM CDT    procedure are in



         the results



         section.

 

 HEMOGLOBIN, SYRINGE  STAT  2018  5:05    Results for this



     PM CDT    procedure are in



         the results



         section.

 

 SODIUM LEVEL, SYRINGE  STAT  2018  5:05    Results for this



     PM CDT    procedure are in



         the results



         section.

 

 ARTERIAL BLOOD GAS,  STAT  2018  5:05    Results for this



 CORRECTED    PM CDT    procedure are in



         the results



         section.

 

 OR FL > 1 HOUR  Routine  2018  4:41    Results for this



     PM CDT    procedure are in



         the results



         section.

 

 TRANSFUSE RED BLOOD  Routine  2018  4:34    



 CELLS    PM CDT    

 

 XR LUMBAR SPINE 1 VW  Routine  2018  4:31    Results for this



     PM CDT    procedure are in



         the results



         section.

 

 XR CERVICAL SPINE 1 VW  Routine  2018  4:31    Results for this



     PM CDT    procedure are in



         the results



         section.

 

 TRANSFUSE RED BLOOD  Routine  2018  4:10    



 CELLS    PM CDT    

 

 LACTIC ACID, SYRINGE  STAT  2018  3:50    Results for this



     PM CDT    procedure are in



         the results



         section.

 

 MAGNESIUM LEVEL  STAT  2018  3:50    Results for this



     PM CDT    procedure are in



         the results



         section.

 

 GLUCOSE LEVEL, SYRINGE  STAT  2018  3:50    Results for this



     PM CDT    procedure are in



         the results



         section.

 

 HEMOGLOBIN, SYRINGE  STAT  2018  3:50    Results for this



     PM CDT    procedure are in



         the results



         section.

 

 IONIZED CALCIUM,  STAT  2018  3:50    Results for this



 ARTERIAL    PM CDT    procedure are in



         the results



         section.

 

 SODIUM LEVEL, SYRINGE  STAT  2018  3:50    Results for this



     PM CDT    procedure are in



         the results



         section.

 

 POTASSIUM, SYRINGE  STAT  2018  3:50    Results for this



     PM CDT    procedure are in



         the results



         section.

 

 ARTERIAL BLOOD GAS,  STAT  2018  3:50    Results for this



 CORRECTED    PM CDT    procedure are in



         the results



         section.

 

 GLUCOSE LEVEL, SYRINGE  STAT  2018  2:11    Results for this



     PM CDT    procedure are in



         the results



         section.

 

 IONIZED CALCIUM,  STAT  2018  2:11    Results for this



 ARTERIAL    PM CDT    procedure are in



         the results



         section.

 

 HEMOGLOBIN, SYRINGE  STAT  2018  2:11    Results for this



     PM CDT    procedure are in



         the results



         section.

 

 POTASSIUM, SYRINGE  STAT  2018  2:11    Results for this



     PM CDT    procedure are in



         the results



         section.

 

 SODIUM LEVEL, SYRINGE  STAT  2018  2:11    Results for this



     PM CDT    procedure are in



         the results



         section.

 

 ARTERIAL BLOOD GAS,  STAT  2018  2:11    Results for this



 CORRECTED    PM CDT    procedure are in



         the results



         section.

 

 OR FL > 1 HOUR  Routine  2018 10:21    Results for this



     AM CDT    procedure are in



         the results



         section.

 

 ARTERIAL LINE  Routine  2018  9:17    



     AM CDT    









   Procedure Note - Kendal Ryan MD - 2018  9:17 AM CDT



Arterial line



   Performed by: KENDAL RYAN



   Authorized by: KENDAL RYAN



   



   Patient Location:  OR



   Start Time:  2018 7:52 AM



   End Time:  2018 7:54 AM



   Staff:



     Anesthesiologist:  KENDAL RYAN



     Performed by:  Anesthesiologist



   Pre-procedure: patient identified, IV checked, site and side verified, risks 
and benefits discussed, procedure verified, surgical consent complete, patient 
position confirmed, monitors and equipment checked and pre-op evaluation 
complete



   MSBT: antiseptic used, all elements of maximal sterile barrier technique 
followed, hand hygiene performed, cap/gown used by other personnel and 
solutions labeled



   TIme Out Performed:  2018 7:52 AM



   Indications:



     Indications: multiple ABGs and hemodynamic monitoring



   Anesthesia:



     Anesthesia:  General



   Procedure Details:



     Arterial Line placement:  Placed post induction



     Line placement site:  Radial



   Line placement side:  Right



     Arterial line gauge:  20 G



   Number of attempts:  1



   Ultrasound guidance used: No



   Post-procedure:



     Post-procedure:  Sterile dressing applied



     Post procedure circulation, sensation, movement:  Unchanged



     Patient tolerance:  Patient tolerated the procedure well with no immediate 
complications









 ZZESTIMATED GFR  STAT  2018  9:05 AM CDT    Results for this



         procedure are in the



         results section.

 

 BASIC METABOLIC PANEL  STAT  2018  9:05 AM CDT    Results for this



         procedure are in the



         results section.

 

 HC COMPLETE BLD COUNT  STAT  2018  9:05 AM CDT    Results for this



 W/AUTO DIFF        procedure are in the



         results section.

 

 SC AN ELECTIVE ENDOTRACHEAL  Routine  2018  8:10 AM CDT    



 AIRWAY        









   Procedure Note - Sulema Cazares, CRNA - 2018  8:10 AM CDT



Airway



   Date/Time: 2018 7:51 AM



   Performed by: SULEMA CAZARES



   Authorized by: KENDAL RYAN



   



   Location:  OR



   Urgency:  Elective



   Difficult Airway: No



   Preoxygenated with 100% O2: Yes



   C-spine Precautions Maintained Throughout: Yes



   Mask Ventilation:  Easy mask



   Final Airway Type:  Endotracheal airway



   Final Endotracheal Airway:  ETT



   Cuffed: Yes



   Technique Used:  Direct laryngoscopy



   Insertion Site:  Oral



   Blade Type:  Val



   Laryngoscope Blade/Videolaryngoscope Blade Size:  3



   ETT Size (mm):  7.0



   Cuff at minimum occlusion pressure: Yes



   Measured from:  Teeth



   ETT to Teeth (cm):  21



   Placement Verified by: CO2 detection, direct visualization and equal breath 
sounds



   Laryngoscopic view:  Grade I - full view of glottis



   Rapid Sequence Induction (RSI): No



   Modified RSI: No



   Number of Attempts at Approach:  1



    Atraumatic insertion. Dentition remained intact.









 SURGICAL PATHOLOGY  Routine  2018  7:41 AM    Results for this



 REQUEST    CDT    procedure are in



         the results



         section.

 

 FUSION, SPINE,    2018  7:30 AM  Other secondary  



 LUMBAR, POSTERIOR    CDT  scoliosis  



 APPROACH        









   Case Notes







   PT TBA ON 7/10, KARI MCDANIEL PRO AXIS TABLE, STEALTH S7, 
OARM,



   AQUAMANTYS, BONE SCAPEL, MEDTRONIC INSTRUMENATION, EMG, MEPS, SSEPS

 

   Special Needs







   PT TBA ON 7/10, DR MERINO CO-SURGEON,  [facility] PRO AXIS TABLE, STEALTH S7, 
OARM,



   AQUAMANTYS, BONE SCAPEL, MEDTRONIC INSTRUMENATION, EMG, MEPS, SSEPS









 FUSION, SPINE, LUMBAR, XLIF    2018  7:30 AM CDT  Other secondary 
scoliosis  









   Case Notes







   PT TBA ON 7/10, DR MERINO ASSISTING,  KARI PRO AXIS TABLE, STEALTH S7, 
OARM,



   AQUAMANTYS, BONE SCAPEL, MEDTRONIC INSTRUMENATION, EMG, MEPS, SSEPS

 

   Special Needs







   PT TBA ON 7/10, DR MERINO CO-SURGEON,  [employer] PRO AXIS TABLE, STEALTH S7, 
OARM,



   AQUAMANTYS, BONE SCAPEL, MEDTRONIC INSTRUMENATION, EMG, MEPS, SSEPS









 POC GLUCOSE  Routine  2018  5:47 AM CDT    Results for this



         procedure are in the



         results section.

 

 PARTIAL THROMBOPLASTIN TIME  Routine  2018  4:28 AM CDT    Results for 
this



 (PTT)        procedure are in the



         results section.

 

 PROTHROMBIN TIME WITH INR  Routine  2018  4:28 AM CDT    Results for this



         procedure are in the



         results section.

 

 HC COMPLETE BLD COUNT W/AUTO  Routine  2018  4:28 AM CDT    Results for 
this



 DIFF        procedure are in the



         results section.

 

 POC GLUCOSE  Routine  2018  5:30 PM CDT    Results for this



         procedure are in the



         results section.

 

 PREPARE RBC  Timed  2018  3:00 PM CDT    Results for this



         procedure are in the



         results section.

 

 PREPARE RBC  Routine  2018  3:00 PM CDT    Results for this



         procedure are in the



         results section.

 

 TYPE AND SCREEN  Routine  2018  3:00 PM CDT    Results for this



         procedure are in the



         results section.

 

 XR ABDOMEN 1 VW PORTABLE  Routine  2018 12:51 PM CDT    Results for this



         procedure are in the



         results section.

 

 POC GLUCOSE  Routine  2018 11:39 AM CDT    Results for this



         procedure are in the



         results section.

 

 POC GLUCOSE  Routine  2018  7:43 AM CDT    Results for this



         procedure are in the



         results section.

 

 HEMOGLOBIN  Routine  2018  4:00 AM CDT    Results for this



         procedure are in the



         results section.

 

 POC GLUCOSE  Routine  2018  9:57 PM CDT    Results for this



         procedure are in the



         results section.

 

 XR SPINE SCOLIOSIS 2-3 VIEWS  Routine  2018  8:49 PM CDT    Results for 
this



         procedure are in the



         results section.

 

 POC GLUCOSE  Routine  2018  5:02 PM CDT    Results for this



         procedure are in the



         results section.

 

 POC GLUCOSE  Routine  2018 11:25 AM CDT    Results for this



         procedure are in the



         results section.

 

 POC GLUCOSE  Routine  2018  8:22 AM CDT    Results for this



         procedure are in the



         results section.

 

 ZZESTIMATED GFR  Routine  2018  3:20 AM CDT    Results for this



         procedure are in the



         results section.

 

 PROTHROMBIN TIME WITH INR  Routine  2018  3:20 AM CDT    Results for this



         procedure are in the



         results section.

 

 PARTIAL THROMBOPLASTIN TIME  Routine  2018  3:20 AM CDT    Results for 
this



 (PTT)        procedure are in the



         results section.

 

 HC COMPLETE BLD COUNT W/AUTO  Routine  2018  3:20 AM CDT    Results for 
this



 DIFF        procedure are in the



         results section.

 

 BASIC METABOLIC PANEL  Routine  2018  3:20 AM CDT    Results for this



         procedure are in the



         results section.

 

 POC GLUCOSE  Routine  07/10/2018  6:07 PM CDT    Results for this



         procedure are in the



         results section.

 

 CT THORACIC SPINE WO  Routine  07/10/2018  5:00 PM CDT    Results for this



 CONTRAST        procedure are in the



         results section.

 

 CT LUMBAR SPINE WO CONTRAST  Routine  07/10/2018  4:59 PM CDT    Results for 
this



         procedure are in the



         results section.

 

 LACTIC ACID LEVEL  Routine  07/10/2018  3:00 PM CDT    Results for this



         procedure are in the



         results section.

 

 LACTIC ACID, SYRINGE  STAT  07/10/2018  1:34 PM CDT    Results for this



         procedure are in the



         results section.

 

 ARTERIAL BLOOD GAS  STAT  07/10/2018  1:34 PM CDT    Results for this



         procedure are in the



         results section.

 

 SURGICAL PATHOLOGY REQUEST  Routine  07/10/2018  1:15 PM CDT    Results for 
this



         procedure are in the



         results section.

 

 POC GLUCOSE  Routine  07/10/2018  1:11 PM CDT    Results for this



         procedure are in the



         results section.

 

 LACTIC ACID, SYRINGE  STAT  07/10/2018 12:19 PM CDT    Results for this



         procedure are in the



         results section.

 

 IONIZED CALCIUM, ARTERIAL  STAT  07/10/2018 12:19 PM CDT    Results for this



         procedure are in the



         results section.

 

 GLUCOSE LEVEL, SYRINGE  STAT  07/10/2018 12:19 PM CDT    Results for this



         procedure are in the



         results section.

 

 POTASSIUM, SYRINGE  STAT  07/10/2018 12:19 PM CDT    Results for this



         procedure are in the



         results section.

 

 SODIUM LEVEL, SYRINGE  STAT  07/10/2018 12:19 PM CDT    Results for this



         procedure are in the



         results section.

 

 HEMOGLOBIN, SYRINGE  STAT  07/10/2018 12:19 PM CDT    Results for this



         procedure are in the



         results section.

 

 ARTERIAL BLOOD GAS,  STAT  07/10/2018 12:19 PM CDT    Results for this



 CORRECTED        procedure are in the



         results section.

 

 MAGNESIUM LEVEL  STAT  07/10/2018 12:05 PM CDT    Results for this



         procedure are in the



         results section.

 

 OR FL > 1 HOUR  Routine  07/10/2018 12:00 PM CDT    Results for this



         procedure are in the



         results section.

 

 LACTIC ACID, SYRINGE  STAT  07/10/2018 10:55 AM CDT    Results for this



         procedure are in the



         results section.

 

 IONIZED CALCIUM, ARTERIAL  STAT  07/10/2018 10:55 AM CDT    Results for this



         procedure are in the



         results section.

 

 MAGNESIUM LEVEL  STAT  07/10/2018 10:55 AM CDT    Results for this



         procedure are in the



         results section.

 

 GLUCOSE LEVEL, SYRINGE  STAT  07/10/2018 10:55 AM CDT    Results for this



         procedure are in the



         results section.

 

 HEMOGLOBIN, SYRINGE  STAT  07/10/2018 10:55 AM CDT    Results for this



         procedure are in the



         results section.

 

 POTASSIUM, SYRINGE  STAT  07/10/2018 10:55 AM CDT    Results for this



         procedure are in the



         results section.

 

 SODIUM LEVEL, SYRINGE  STAT  07/10/2018 10:55 AM CDT    Results for this



         procedure are in the



         results section.

 

 ARTERIAL BLOOD GAS,  STAT  07/10/2018 10:55 AM CDT    Results for this



 CORRECTED        procedure are in the



         results section.

 

 ARTERIAL LINE  Routine  07/10/2018  9:30 AM CDT    









   Procedure Note - Keith Reece MD - 07/10/2018  9:30 AM CDT



Arterial line



   Performed by: JUANITA GASPAR



   Authorized by: KEITH REECE



   



   Patient Location:  OR



   Start Time:  7/10/2018 7:46 AM



   End Time:  7/10/2018 7:49 AM



   Staff:



     Anesthesiologist:  KEITH REECE



     Resident/CRNA/AA:  JUANITA GASPAR



     Performed by:  Anesthesiologist



   Pre-procedure: patient identified, IV checked, site and side verified, risks 
and benefits discussed, procedure verified, surgical consent complete, patient 
position confirmed, monitors and equipment checked and pre-op evaluation 
complete



   MSBT: antiseptic used, all elements of maximal sterile barrier technique 
followed, hand hygiene performed, cap/gown used by other personnel and 
solutions labeled



   TIme Out Performed:  7/10/2018 7:45 AM



   Indications:



     Indications: multiple ABGs and hemodynamic monitoring



   Anesthesia:



     Anesthesia:  General



   Procedure Details:



     Arterial Line placement:  Placed post induction



     Line placement site:  Radial



   Line placement side:  Right



     Arterial line gauge:  20 G



   Number of attempts:  1



   Ultrasound guidance used: No



   Post-procedure:



     Post-procedure:  Sterile dressing applied



     Post procedure circulation, sensation, movement:  Normal and unchanged



     Patient tolerance:  Patient tolerated the procedure well with no immediate 
complications



   Notes:



      A-line placed by Dr. Reece









 SC AN ELECTIVE ENDOTRACHEAL AIRWAY  Routine  07/10/2018  8:53 AM CDT    









   Procedure Note - Juanita Gaspar CRNA - 07/10/2018  8:53 AM CDT



Airway



   Date/Time: 7/10/2018 7:47 AM



   Performed by: JUANITA GASPAR



   Authorized by: KEITH REECE



   



   Location:  OR



   Urgency:  Elective



   Difficult Airway: No



   Anesthesiologist:  KEITH REECE



   Resident/CRNA/AA:  JUANITA GASPAR



   Performed by:



      resident/CRNA/AA



   Preoxygenated with 100% O2: Yes



   C-spine Precautions Maintained Throughout: No



   Mask Ventilation:  Easy mask



   Final Airway Type:  Endotracheal airway



   Final Endotracheal Airway:  ETT



   Cuffed: Yes



   Technique Used:  Video laryngoscopy



   Devices/Methods Used in Placement:  Intubating stylet



   Insertion Site:  Oral



   Laryngoscope Blade/Videolaryngoscope Blade Size:  3



   ETT Size (mm):  7.0



   Cuff at minimum occlusion pressure: Yes



   Measured from:  Teeth



   ETT to Teeth (cm):  22



   Placement Verified by: CO2 detection, direct visualization and equal breath 
sounds



   Laryngoscopic view:  Grade I - full view of glottis



   Rapid Sequence Induction (RSI): No



   Modified RSI: No



   Number of Attempts at Approach:  2



    First attempt with MAC 3 blade via DL unsuccessful - no view.  Second 
attempt with Glidescope 3, easy intubation, grade 1 view, teeth/lips unchanged 
from preop condition









 LACTIC ACID, SYRINGE  STAT  07/10/2018  8:30 AM    Results for this



     CDT    procedure are in



         the results



         section.

 

 MAGNESIUM LEVEL  STAT  07/10/2018  8:30 AM    Results for this



     CDT    procedure are in



         the results



         section.

 

 GLUCOSE LEVEL,  STAT  07/10/2018  8:30 AM    Results for this



 SYRINGE    CDT    procedure are in



         the results



         section.

 

 HEMOGLOBIN, SYRINGE  STAT  07/10/2018  8:30 AM    Results for this



     CDT    procedure are in



         the results



         section.

 

 IONIZED CALCIUM,  STAT  07/10/2018  8:30 AM    Results for this



 ARTERIAL    CDT    procedure are in



         the results



         section.

 

 POTASSIUM, SYRINGE  STAT  07/10/2018  8:30 AM    Results for this



     CDT    procedure are in



         the results



         section.

 

 SODIUM LEVEL,  STAT  07/10/2018  8:30 AM    Results for this



 SYRINGE    CDT    procedure are in



         the results



         section.

 

 ARTERIAL BLOOD GAS,  STAT  07/10/2018  8:30 AM    Results for this



 CORRECTED    CDT    procedure are in



         the results



         section.

 

 FUSION, SPINE,    07/10/2018  7:30 AM  Other secondary  



 LUMBAR, XLIF    CDT  scoliosis  









   Case Notes







   DR JEANIE MOHR TO DO EXPOSURE, C-ARM. AMSCO BED, NUVASIVE INSTRUMENATION, EMG, 
SSEP,



   MEPS

 

   Special Needs







   DR JEANIE MOHR TO DO EXPOSURE, C-ARM. AMSCO BED, NUVASIVE INSTRUMENATION, EMG, 
SSEP,



   MEPS









 POC GLUCOSE  Routine  07/10/2018  6:54    Results for this



     AM CDT    procedure are in



         the results



         section.

 

 XR CHEST 2 VW  Routine  2018  5:24  Preop testing  Results for this



     PM CDT    procedure are in



         the results



         section.

 

 URINE CULTURE  Routine  2018  4:50    Results for this



     PM CDT    procedure are in



         the results



         section.

 

 ECG 12-LEAD  Routine  2018  4:44  Preop testing  Results for this



     PM CDT    procedure are in



         the results



         section.

 

 ZZESTIMATED GFR  Routine  2018  4:21    Results for this



     PM CDT    procedure are in



         the results



         section.

 

 BASIC METABOLIC PANEL  Routine  2018  4:21  Preop testing  Results for 
this



     PM CDT    procedure are in



         the results



         section.

 

 CBC HEMOGRAM  Routine  2018  4:21  Preop testing  Results for this



     PM CDT    procedure are in



         the results



         section.

 

 URINALYSIS SCREEN AND  Routine  2018  4:21  Preop testing  Results for 
this



 MICROSCOPY, WITH    PM CDT    procedure are in



 REFLEX TO CULTURE        the results



         section.

 

 HEPATIC FUNCTION PANEL  Routine  2018  4:21  Preop testing  Results for 
this



     PM CDT    procedure are in



         the results



         section.

 

 PARTIAL THROMBOPLASTIN  Routine  2018  4:21  Preop testing  Results for 
this



 TIME (PTT)    PM CDT    procedure are in



         the results



         section.

 

 PROTHROMBIN TIME WITH  Routine  2018  4:21  Preop testing  Results for 
this



 INR    PM CDT    procedure are in



         the results



         section.

 

 TYPE AND SCREEN  Routine  2018  4:21  Preop testing  Results for this



     PM CDT    procedure are in



         the results



         section.

 

 VITAMIN D 25 HYDROXY  Routine  2018  4:21  Preop testing  Results for 
this



 LEVEL    PM CDT    procedure are in



         the results



         section.

 

 PARATHYROID HORMONE  Routine  2018  4:21  Preop testing  Results for this



     PM CDT    procedure are in



         the results



         section.

 

 PHOSPHORUS LEVEL  Routine  2018  4:21  Preop testing  Results for this



     PM CDT    procedure are in



         the results



         section.

 

 MAGNESIUM LEVEL  Routine  2018  4:21  Preop testing  Results for this



     PM CDT    procedure are in



         the results



         section.

 

 HEMOGLOBIN A1C  Routine  2018  4:21  Preop testing  Results for this



     PM CDT    procedure are in



         the results



         section.

 

 BONE DENSITY  Routine  2018  5:25  Age-related  Results for this



 PERIPHERAL    PM CDT  osteoporosis with  procedure are in



       current pathological  the results



       fracture, initial  section.



       encounter  

 

 BONE DENSITY  Routine  2018  5:25  Age-related  Results for this



     PM CDT  osteoporosis with  procedure are in



       current pathological  the results



       fracture, initial  section.



       encounter  



after 2018



Results

XR Spine Scoliosos 2-3 Views (2019  2:09 PM CST)Only the most recent of3 
resultswithin the time period is included.





 Narrative  Performed At

 

 EXAMINATION:XR SPINE SCOLIOSIS 2-3 VIEWS



  [facility]



  



  



 CLINICAL HISTORY:M41.9 Scoliosisunspecified, SCOLIOSIS



  



  



  



  



  



  



  



 COMPARISON: 2018.



  



  



  



 IMPRESSION:



  



  



  



 9 views of the spine are submitted per scoliosis protocol.



  



  



  



 Again noted is posterior fusion extending from T4 through the sacroiliac  



 joints. Interbody fusions are again noted at L2-3, L4-5, and L5-S1. ACDF  



 is again seen at C5-6.



  



  



  



 There is interval fracture of the left sacroiliac screw.



  



  



  



 Thoracic dextroscoliosis is similar to the comparison study, measuring  



 35 degrees. Cervicothoracic and lumbar levocurvature is again seen as  



 well.



  



  



  



 The plumbline measures 3.7 cm left of the mid sacral line. 



  



  



  



 The plumbline measures 5.8 cm ventral to the posterior superior corner  



 of S1.



  



  



  



 A pump overlies the right abdomen. An intrathecal catheter terminates at  



 the T11 level.



  



  



  



 There are bilateral hip arthroplasties.



  



  



  



 [id number]



  



   









 Procedure Note

 

  Interface, Radiology Results Incoming - 2019  2:47 PM CST



EXAMINATION:  XR SPINE SCOLIOSIS 2-3 VIEWS



 



 CLINICAL HISTORY:  M41.9 Scoliosis  unspecified, SCOLIOSIS



 



 



 



 COMPARISON: 2018.



 



 IMPRESSION:



 



 9 views of the spine are submitted per scoliosis protocol.



 



 Again noted is posterior fusion extending from T4 through the sacroiliac 
joints. Interbody fusions are again noted at L2-3, L4-5, and L5-S1. ACDF is 
again seen at C5-6.



 



 There is interval fracture of the left sacroiliac screw.



 



 Thoracic dextroscoliosis is similar to the comparison study, measuring 35 
degrees. Cervicothoracic and lumbar levocurvature is again seen as well.



 



 The plumbline measures 3.7 cm left of the mid sacral line.



 



 The plumbline measures 5.8 cm ventral to the posterior superior corner of S1.



 



 A pump overlies the right abdomen. An intrathecal catheter terminates at the 
T11 level.



 



 There are bilateral hip arthroplasties.



 



 [facility]-[id number]









 Performing Organization  Address  City/[facility]  Phone Number

 

  [facility]  [address] [place] [address]  



Transfuse RBC (2018  5:55 PM CDT)Only the most recent of4 resultswithin 
the time period is included.POC glucose (2018  7:13 AM CDT)Only the most 
recent of87 resultswithin the time period is included.





 Component  Value  Ref Range  Performed At

 

 POC glucose  106 (H)  65 - 99 mg/dL  [facility] DEPARTMENT OF PATHOLOGY



   Comment:    AND GENOMIC MEDICINE



   [facility] Notified RN    



   Meter ID: [id number]    



   : Edwin Angelo D    



       









 Performing Organization  Address  City/[facility]/Zipcode  Phone Number

 

 [facility] DEPARTMENT OF PATHOLOGY AND  [address] [place] [address]  



 [facility]      



Estimated GFR (2018  4:00 AM CDT)Only the most recent of22 resultswithin 
the time period is included.





 Component  Value  Ref Range  Performed At

 

 GFR Non Af Amer  74  mL/min/1.73 m2  [facility] DEPARTMENT OF



       PATHOLOGY AND GENOMIC



       MEDICINE

 

 GFR Af Amer  90  mL/min/1.73 m2  [facility] DEPARTMENT OF



   Comment:    PATHOLOGY AND GENOMIC



   Chronic kidney disease: <60 mL/min/1.73m2    MEDICINE



   Kidney failure: <15 mL/min/1.73m2    



   The estimated GFR is calculated from the IDMS-traceable Modification of Diet
    



   in Renal Disease Equation. The accuracy of the calculation is poor when the 
   



   creatinine is normal. Calculated values >90 mL/min/1.73m2 are not reported. 
   



   This equation has not been validated in children (<18 years), pregnant    



   women, the elderly (>70 years), or ethnic groups other than Caucasians and  
  



    Americans.    



       









 Specimen

 

 Plasma specimen









 Performing Organization  Address  City/[facility]  Phone Number

 

 [facility] DEPARTMENT OF PATHOLOGY AND  [address] [place] [address] MEDICINE      



Basic metabolic panel (2018  4:00 AM CDT)Only the most recent of18 
resultswithin the time period is included.





 Component  Value  Ref Range  Performed At

 

 Sodium  139  135 - 148 mEq/L  [facility] DEPARTMENT OF PATHOLOGY AND GENOMIC MEDICINE

 

 Potassium  3.9  3.5 - 5.0 mEq/L  [facility] DEPARTMENT OF PATHOLOGY AND GENOMIC 
MEDICINE

 

 Chloride  100  98 - 112 mEq/L  [facility] DEPARTMENT OF PATHOLOGY AND GENOMIC MEDICINE

 

 CO2  27  24 - 31 mEq/L  [facility] DEPARTMENT OF PATHOLOGY AND GENOMIC MEDICINE

 

 Anion gap  12@ANIO  7 - 15 mEq/L  [facility] DEPARTMENT OF PATHOLOGY AND GENOMIC 
MEDICINE

 

 BUN  9  6 - 20 mg/dL  [facility] DEPARTMENT OF PATHOLOGY AND GENOMIC MEDICINE

 

 Creatinine  0.8  0.5 - 0.9 mg/dL  [facility] DEPARTMENT OF PATHOLOGY AND GENOMIC 
MEDICINE

 

 Glucose  102 (H)  65 - 99 mg/dL  [facility] DEPARTMENT OF PATHOLOGY AND GENOMIC 
MEDICINE

 

 Calcium  9.1  8.3 - 10.2 mg/dL  [facility] DEPARTMENT OF PATHOLOGY AND GENOMIC 
MEDICINE









 Specimen

 

 Plasma specimen









 Performing Organization  Address  City/[facility]/Zipcode  Phone Number

 

 [facility] DEPARTMENT OF PATHOLOGY AND  [address] [place], [place] [id number]  



 [facility]      



Smear review (2018  5:00 AM CDT)Only the most recent of5 resultswithin 
the time period is included.





 Component  Value  Ref Range  Performed At

 

 Platelet slide review  Solitario adequate    [facility] DEPARTMENT OF PATHOLOGY AND GENOMIC



       MEDICINE

 

 Anisocytosis  Moderate    [facility] DEPARTMENT OF PATHOLOGY AND GENOMIC



       MEDICINE

 

 Polychromasia  Moderate    [facility] DEPARTMENT OF PATHOLOGY AND GENOMIC



       MEDICINE

 

 Ovalocytes  Moderate    [facility] DEPARTMENT OF PATHOLOGY AND GENOMIC



       MEDICINE

 

 Enlarged platelets  Moderate (A)    [facility] DEPARTMENT OF PATHOLOGY AND GENOMIC



       MEDICINE









 Performing Organization  Address  City/State/Zipcode  Phone Number

 

 [facility] DEPARTMENT OF PATHOLOGY AND  [address] [place] [address]  



 GENOMIC [facility]      



CBC with platelet and differential (2018  5:00 AM CDT)Only the most 
recent of15 resultswithin the time period is included.





 Component  Value  Ref Range  Performed At

 

 WBC  4.06 (L)  4.50 - 11.00 k/uL  [facility] DEPARTMENT OF



       PATHOLOGY AND GENOMIC



       MEDICINE

 

 RBC  2.73 (L)  4.20 - 5.50 m/uL  [facility] DEPARTMENT OF



       PATHOLOGY AND GENOMIC



       MEDICINE

 

 HGB  8.4 (L)  12.0 - 16.0 g/dL  [facility] DEPARTMENT OF



       PATHOLOGY AND GENOMIC



       MEDICINE

 

 HCT  27.1 (L)  37.0 - 47.0 %  [facility] DEPARTMENT OF



       PATHOLOGY AND GENOMIC



       MEDICINE

 

 MCV  99.3  82.0 - 100.0 fL  [facility] DEPARTMENT OF



       PATHOLOGY AND GENOMIC



       MEDICINE

 

 MCH  30.8  27.0 - 34.0 pg  [facility] DEPARTMENT OF



       PATHOLOGY AND GENOMIC



       MEDICINE

 

 MCHC  31.0  31.0 - 37.0 g/dL  [facility] DEPARTMENT OF



       PATHOLOGY AND GENOMIC



       MEDICINE

 

 RDW - SD  65.3 (H)  37.0 - 55.0 fL  [facility] DEPARTMENT OF



       PATHOLOGY AND GENOMIC



       MEDICINE

 

 MPV  11.2  8.8 - 13.2 fL  [facility] DEPARTMENT OF



       PATHOLOGY AND GENOMIC



       MEDICINE

 

 Platelet count  151  150 - 400 k/uL  [facility] DEPARTMENT OF



       PATHOLOGY AND GENOMIC



       MEDICINE

 

 Nucleated RBC  0.00  /100 WBC  [facility] DEPARTMENT OF



       PATHOLOGY AND GENOMIC



       MEDICINE

 

 Neutrophils  44.2  39.0 - 69.0 %  [facility] DEPARTMENT OF



       PATHOLOGY AND GENOMIC



       MEDICINE

 

 Lymphocytes  39.9  25.0 - 45.0 %  [facility] DEPARTMENT OF



       PATHOLOGY AND GENOMIC



       MEDICINE

 

 Monocytes  13.8 (H)  0.0 - 10.0 %  [facility] DEPARTMENT OF



       PATHOLOGY AND GENOMIC



       MEDICINE

 

 Eosinophils  1.2  0.0 - 5.0 %  [facility] DEPARTMENT OF



       PATHOLOGY AND GENOMIC



       MEDICINE

 

 Basophils  0.2  0.0 - 1.0 %  [facility] DEPARTMENT OF



       PATHOLOGY AND GENOMIC



       MEDICINE

 

 Immature granulocytes  0.7Comment:  0.0 - 1.0 %  [facility] DEPARTMENT OF



   "Immature    PATHOLOGY AND GENOMIC



   granulocytes"    MEDICINE



   (promyelocytes,    



   myelocytes,    



   metamyelocytes)    









 Specimen

 

 Blood









 Performing Organization  Address  City/[facility] Centercode  Phone Number

 

 [facility] DEPARTMENT OF PATHOLOGY AND  [address] [place] [id number]  



 [facility]      



Thyroid stimulating hormone (2018  5:00 AM CDT)





 Component  Value  Ref Range  Performed At

 

 TSH  2.00  0.27 - 4.20 uIU/mL  [facility] DEPARTMENT OF PATHOLOGY AND GENOMIC MEDICINE









 Specimen

 

 Plasma specimen









 Performing Organization  Address  City/State/[facility] Centercode  Phone Number

 

 [facility] DEPARTMENT OF PATHOLOGY AND  [address] [place] [phone]  



 [facility]      



Urinalysis screen and microscopy, with reflex to culture (2018  9:16 PM 
CDT)Only the most recent of2 resultswithin the time period is included.





 Component  Value  Ref Range  Performed At

 

 Specimen site  Clean catch    [facility] DEPARTMENT OF PATHOLOGY AND



       GENOMIC MEDICINE

 

 Color, UA  Straw    [facility] DEPARTMENT OF PATHOLOGY AND



       GENOMIC MEDICINE

 

 Appearance, UA  Clear    [facility] DEPARTMENT OF PATHOLOGY AND



       GENOMIC MEDICINE

 

 Specific gravity, UA  1.006  1.001 - 1.035  [facility] DEPARTMENT OF PATHOLOGY AND



       GENOMIC MEDICINE

 

 pH, UA  6.0  5.0 - 8.5  [facility] DEPARTMENT OF PATHOLOGY AND



       GENOMIC MEDICINE

 

 Protein, UA  Negative  Negative  [facility] DEPARTMENT OF PATHOLOGY AND



       GENOMIC MEDICINE

 

 Glucose, UA  Negative  Negative  [facility] DEPARTMENT OF PATHOLOGY AND



       GENOMIC MEDICINE

 

 Ketones, UA  Negative  Negative  [facility] DEPARTMENT OF PATHOLOGY AND



       GENOMIC MEDICINE

 

 Bilirubin, UA  Negative  Negative  [facility] DEPARTMENT OF PATHOLOGY AND



       GENOMIC MEDICINE

 

 Blood, UA  Negative  Negative  [facility] DEPARTMENT OF PATHOLOGY AND



       GENOMIC MEDICINE

 

 Nitrite, UA  Negative  Negative  [facility] DEPARTMENT OF PATHOLOGY AND



       GENOMIC MEDICINE

 

 Urobilinogen, UA  <2.0  <2.0  [facility] DEPARTMENT OF PATHOLOGY AND



       GENOMIC MEDICINE

 

 Leukocyte esterase, UA  Negative  Negative  [facility] DEPARTMENT OF PATHOLOGY AND



       GENOMIC MEDICINE

 

 Epithelial cells, UA  5  /HPF  [facility] DEPARTMENT OF PATHOLOGY AND



       GENOMIC MEDICINE

 

 WBC, UA  1  0 - 4 /HPF  [facility] DEPARTMENT OF PATHOLOGY AND



       GENOMIC MEDICINE

 

 RBC, UA  None seen  0 - 5 /HPF  [facility] DEPARTMENT OF PATHOLOGY AND



       GENOMIC MEDICINE

 

 Bacteria, UA  Few  None seen  [facility] DEPARTMENT OF PATHOLOGY AND



       GENOMIC MEDICINE

 

 Yeast, UA  None seen    [facility] DEPARTMENT OF PATHOLOGY AND



       GENOMIC MEDICINE

 

 Yeast with pseudohyphae, UA  None seen    [facility] DEPARTMENT OF PATHOLOGY AND



       GENOMIC MEDICINE









 Specimen

 

 Urine









 Performing Organization  Address  City/[facility]/Zipcode  Phone Number

 

 [facility] DEPARTMENT OF PATHOLOGY AND  [address] [place] [address]  



 GENOMIC MEDICINE      



Urine culture (2018  9:16 PM CDT)Only the most recent of2 resultswithin 
the time period is included.





 Component  Value  Ref Range  Performed At

 

 Urine culture  SEE COMMENTComment: Bacteriuria    [facility] DEPARTMENT OF PATHOLOGY



   screen negative.    AND GENOMIC MEDICINE









 Performing Organization  Address  City/State/[facility] Centercode  Phone Number

 

 [facility] DEPARTMENT OF PATHOLOGY AND  [address] [place] [id number]  



 GENOMIC MEDICINE      



NM Lung Ventilation Perfusion (2018  8:29 PM CDT)





 Narrative  Performed At

 

 CLINICAL HISTORY: PE suspectedlow pretest prob, Taqchycardia unknow   
[facility]



 etiology- S P Spine sx



  



  



  



 TECHNIQUE:



  



 The patient breathed 15-20 mCi of xenon-133 gas through a closed  



 ventilation system while dynamic imaging of the lungs was performed in  



 the posterior and anterior projections. The patient was then injected  



 with 5 mCi of technetium-99m-MAA intravenously, 



  



 followed by imaging of the lungs in anterior, posterior, and oblique  



 projections. 



  



  



  



 FINDINGS: 



  



 Small perfusion defect left upper lobe likely related to aortic arch.  



 Mildly reduced perfusion and ventilation right upper lobe posteriorly. 



  



  



  



 IMPRESSION: 



  



  



  



 Low Probability for pulmonary embolism.



  



  



  



  



  



  



  



  



  



 [facility]



  



   









 Procedure Note

 

  Interface, Radiology Results Incoming - 2018  8:35 PM CDT



CLINICAL HISTORY: PE suspected  low pretest prob, Taqchycardia unknow etiology- 
S P Spine sx



 



 TECHNIQUE:



 The patient breathed 15-20 mCi of xenon-133 gas through a closed ventilation 
system while dynamic imaging of the lungs was performed in the posterior and 
anterior projections. The patient was then injected with 5



 mCi of technetium-99m-MAA intravenously,



 followed by imaging of the lungs in anterior, posterior, and oblique 
projections.



 



 FINDINGS:



 Small perfusion defect left upper lobe likely related to aortic arch. Mildly 
reduced perfusion and ventilation right upper lobe posteriorly.



 



 IMPRESSION:



 



     Low Probability for pulmonary embolism.



 



 



 



 



 [facility]









 Performing Organization  Address  City/[facility]/Zipcode  Phone Number

 

  [facility]  [address] [place] [address]  



XR Chest 1 Vw Portable (2018  5:11 PM CDT)Only the most recent of4 
resultswithin the time period is included.





 Narrative  Performed At

 

 EXAMINATION: Portable chest x-ray



  [facility]



  



  



 CLINICAL HISTORY:sob



  



  



  



  



  



 COMPARISON: Most recent available chest x-ray.



  



  



  



 Heart size is enlarged.



  



 A PICC line is located in the superior vena cava.



  



 There are transpedicular screws and stabilizing bars throughout the  



 dorsal lumbar spine scoliotic curvature. There is internal interbody  



 fixation of the lower cervical spine



  



  



  



 IMPRESSION:



  



  



  



 1.There are decreased lung volumes with crowding of blood vessels in  



 the infrahilar regions. There are no focal infiltrates.



  



 2.Vascular congestion has improved



  



 3.Mild retrocardiac atelectasis and a trace of pleural fluid.



  



 4.No pneumothorax.



  



  



  



  



  



  



  



 [id number]



  



   









 Procedure Note

 

 Hm Interface, Radiology Results Incoming - 2018  5:23 PM CDT



EXAMINATION: Portable chest x-ray



 



 CLINICAL HISTORY:  sob



 



 



 COMPARISON: Most recent available chest x-ray.



 



 Heart size is enlarged.



 A PICC line is located in the superior vena cava.



 There are transpedicular screws and stabilizing bars throughout the dorsal 
lumbar spine scoliotic curvature. There is internal interbody fixation of the 
lower cervical spine



 



 IMPRESSION:



 



 1.  There are decreased lung volumes with crowding of blood vessels in the 
infrahilar regions. There are no focal infiltrates.



 2.  Vascular congestion has improved



 3.  Mild retrocardiac atelectasis and a trace of pleural fluid.



 4.  No pneumothorax.



 



 



 



 [id number]









 Performing Organization  Address  City/State/Zipcode  Phone Number

 

  [facility]  [address] [place] [address]  



Comprehensive metabolic panel (2018 12:55 PM CDT)Only the most recent of4 
resultswithin the time period is included.





 Component  Value  Ref Range  Performed At

 

 Sodium  140  135 - 148 mEq/L  [facility] DEPARTMENT OF



       PATHOLOGY AND GENOMIC



       MEDICINE

 

 Potassium  3.3 (L)  3.5 - 5.0 mEq/L  [facility] DEPARTMENT OF



       PATHOLOGY AND GENOMIC



       MEDICINE

 

 Chloride  101  98 - 112 mEq/L  [facility] DEPARTMENT OF



       PATHOLOGY AND GENOMIC



       MEDICINE

 

 CO2  25  24 - 31 mEq/L  [facility] DEPARTMENT OF



       PATHOLOGY AND GENOMIC



       MEDICINE

 

 Anion gap  14@ANIO  7 - 15 mEq/L  [facility] DEPARTMENT OF



       PATHOLOGY AND GENOMIC



       MEDICINE

 

 BUN  7  6 - 20 mg/dL  [facility] DEPARTMENT OF



       PATHOLOGY AND GENOMIC



       MEDICINE

 

 Creatinine  0.7  0.5 - 0.9 mg/dL  [facility] DEPARTMENT OF



       PATHOLOGY AND GENOMIC



       MEDICINE

 

 Glucose  125 (H)  65 - 99 mg/dL  [facility] DEPARTMENT OF



       PATHOLOGY AND GENOMIC



       MEDICINE

 

 Calcium  8.5  8.3 - 10.2 mg/dL  [facility] DEPARTMENT OF



       PATHOLOGY AND GENOMIC



       MEDICINE

 

 Protein  6.9  6.3 - 8.3 g/dL  [facility] DEPARTMENT OF



   Comment:    PATHOLOGY AND GENOMIC



   [place] 4.6-7.0 g/dL    MEDICINE



   1 week 4.4-7.6 g/dL    



   7 months-1year5.1-7.3 g/dL    



   1-2 years5.6-7.5 g/dL    



   >3 years6.0-8.0 g/dL    



    6.3-8.3 g/dL    



       

 

 Albumin  2.4 (L)  3.5 - 5.0 g/dL  [facility] DEPARTMENT OF



       PATHOLOGY AND GENOMIC



       MEDICINE

 

 A/G ratio  0.5 (L)  0.7 - 3.8  [facility] DEPARTMENT OF



       PATHOLOGY AND GENOMIC



       MEDICINE

 

 Alkaline phosphatase  95  35 - 104 U/L  [facility] DEPARTMENT OF



       PATHOLOGY AND GENOMIC



       MEDICINE

 

 AST  23  10 - 35 U/L  [facility] DEPARTMENT OF



       PATHOLOGY AND GENOMIC



       MEDICINE

 

 ALT  32  5 - 50 U/L  [facility] DEPARTMENT OF



       PATHOLOGY AND GENOMIC



       MEDICINE

 

 Total bilirubin  <0.2  0.0 - 1.2 mg/dL  [facility] DEPARTMENT OF



       PATHOLOGY AND GENOMIC



       MEDICINE









 Specimen

 

 Plasma specimen









 Performing Organization  Address  City/State/[facility] Centercode  Phone Number

 

 [facility] DEPARTMENT OF PATHOLOGY AND  [address] [place] [address]      



Hemoglobin A1c (2018  7:02 AM CDT)Only the most recent of2 resultswithin 
the time period is included.





 Component  Value  Ref Range  Performed At

 

 Hemoglobin A1C  5.5  4.0 - 5.6 %  [facility] DEPARTMENT OF PATHOLOGY



   Comment:    AND GENOMIC MEDICINE



   HbA1c cutoffs for diagnosing diabetes:    



   4.0% - 5.6%=normal    



   5.7% - 6.4%=increased risk for diabetes (prediabetes)    



   >=6.5%=diabetes    



   Goals for glycemic control (ADA 2016)    



   < 7.0%Target for nonpregnant adults with diabetes.    



   More or less stringent targets may be appropriate for    



   individual patients.    



   <7.5% Target for Children and adolescents with type 1    



   diabetes.    



       









 Specimen

 

 Blood









 Performing Organization  Address  City/State/Zipcode  Phone Number

 

 [facility] DEPARTMENT OF PATHOLOGY AND  [address] [place] [id number]  



 [facility]      



Urinalysis, automated with microscopy (2018  8:30 PM CDT)





 Component  Value  Ref Range  Performed At

 

 Color, UA  Straw    [facility] DEPARTMENT OF PATHOLOGY AND



       GENOMIC MEDICINE

 

 Appearance, UA  Clear    [facility] DEPARTMENT OF PATHOLOGY AND



       GENOMIC MEDICINE

 

 Specific gravity, UA  1.006  1.001 - 1.035  [facility] DEPARTMENT OF PATHOLOGY AND



       GENOMIC MEDICINE

 

 pH, UA  6.0  5.0 - 8.5  [facility] DEPARTMENT OF PATHOLOGY AND



       GENOMIC MEDICINE

 

 Protein, UA  Negative  Negative  [facility] DEPARTMENT OF PATHOLOGY AND



       GENOMIC MEDICINE

 

 Glucose, UA  Negative  Negative  [facility] DEPARTMENT OF PATHOLOGY AND



       GENOMIC MEDICINE

 

 Ketones, UA  Negative  Negative  [facility] DEPARTMENT OF PATHOLOGY AND



       GENOMIC MEDICINE

 

 Bilirubin, UA  Negative  Negative  [facility] DEPARTMENT OF PATHOLOGY AND



       GENOMIC MEDICINE

 

 Blood, UA  Negative  Negative  [facility] DEPARTMENT OF PATHOLOGY AND



       GENOMIC MEDICINE

 

 Nitrite, UA  Negative  Negative  [facility] DEPARTMENT OF PATHOLOGY AND



       GENOMIC MEDICINE

 

 Urobilinogen, UA  <2.0  <2.0  [facility] DEPARTMENT OF PATHOLOGY AND



       GENOMIC MEDICINE

 

 Leukocyte esterase, UA  Negative  Negative  [facility] DEPARTMENT OF PATHOLOGY AND



       GENOMIC MEDICINE

 

 Epithelial cells, UA  6  /HPF  [facility] DEPARTMENT OF PATHOLOGY AND



       GENOMIC MEDICINE

 

 WBC, UA  None seen  0 - 4 /HPF  [facility] DEPARTMENT OF PATHOLOGY AND



       GENOMIC MEDICINE

 

 RBC, UA  None seen  0 - 5 /HPF  [facility] DEPARTMENT OF PATHOLOGY AND



       GENOMIC MEDICINE

 

 Bacteria, UA  None seen  None seen  [facility] DEPARTMENT OF PATHOLOGY AND



       GENOMIC MEDICINE

 

 Yeast, UA  None seen    [facility] DEPARTMENT OF PATHOLOGY AND



       GENOMIC MEDICINE

 

 Yeast with pseudohyphae, UA  None seen    [facility] DEPARTMENT OF PATHOLOGY AND



       GENOMIC MEDICINE









 Specimen

 

 Urine









 Performing Organization  Address  City/[facility] Centercode  Phone Number

 

 [facility] DEPARTMENT OF PATHOLOGY AND  [address] [place] [address]      



Prealbumin level (2018  7:45 PM CDT)





 Component  Value  Ref Range  Performed At

 

 Prealbumin  15 (L)  16 - 32 mg/dL  [facility] DEPARTMENT OF PATHOLOGY AND GENOMIC 
MEDICINE









 Specimen

 

 Serum









 Performing Organization  Address  City/[facility] Centercode  Phone Number

 

 [facility] DEPARTMENT OF PATHOLOGY AND  [address] [place] [address]      



Phosphorus level (2018  7:45 PM CDT)Only the most recent of7 
resultswithin the time period is included.





 Component  Value  Ref Range  Performed At

 

 Phosphorus  3.0  2.4 - 4.5 mg/dL  [facility] DEPARTMENT OF PATHOLOGY AND GENOMIC 
MEDICINE









 Specimen

 

 Plasma specimen









 Performing Organization  Address  City/[facility] Centercode  Phone Number

 

 [facility] DEPARTMENT OF PATHOLOGY AND  [address] [place] [address]      



Magnesium level (2018  7:45 PM CDT)Only the most recent of12 
resultswithin the time period is included.





 Component  Value  Ref Range  Performed At

 

 Magnesium  2.2  1.6 - 2.6 mg/dL  [facility] DEPARTMENT OF PATHOLOGY AND GENOMIC 
MEDICINE









 Specimen

 

 Plasma specimen









 Performing Organization  Address  City/[facility] Centercode  Phone Number

 

 [facility] DEPARTMENT OF PATHOLOGY AND  [address] [place] [address]      



Potassium level (2018  8:22 AM CDT)Only the most recent of3 resultswithin 
the time period is included.





 Component  Value  Ref Range  Performed At

 

 Potassium  3.5  3.5 - 5.0 mEq/L  [facility] DEPARTMENT OF PATHOLOGY AND GENOMIC 
MEDICINE









 Specimen

 

 Plasma specimen









 Performing Organization  Address  City/[facility] Centercode  Phone Number

 

 [facility] DEPARTMENT OF PATHOLOGY AND  [address] [place] [address] [id number] [place] [address]      



CBC hemogram (2018  3:25 AM CDT)Only the most recent of3 resultswithin 
the time period is included.





 Component  Value  Ref Range  Performed At

 

 WBC  5.93Comment: WBC was  4.50 - 11.00 k/uL  [facility] DEPARTMENT OF



   corrected for NRBCs    PATHOLOGY AND GENOMIC



       MEDICINE

 

 RBC  2.58 (L)  4.20 - 5.50 m/uL  [facility] DEPARTMENT OF



       PATHOLOGY AND GENOMIC



       MEDICINE

 

 HGB  8.0 (L)  12.0 - 16.0 g/dL  [facility] DEPARTMENT OF



       PATHOLOGY AND GENOMIC



       MEDICINE

 

 HCT  24.7 (L)  37.0 - 47.0 %  [facility] DEPARTMENT OF



       PATHOLOGY AND GENOMIC



       MEDICINE

 

 MCV  95.7  82.0 - 100.0 fL  [facility] DEPARTMENT OF



       PATHOLOGY AND GENOMIC



       MEDICINE

 

 MCH  31.0  27.0 - 34.0 pg  [facility] DEPARTMENT OF



       PATHOLOGY AND GENOMIC



       MEDICINE

 

 MCHC  32.4  31.0 - 37.0 g/dL  [facility] DEPARTMENT OF



       PATHOLOGY AND GENOMIC



       MEDICINE

 

 RDW - SD  60.1 (H)  37.0 - 55.0 fL  [facility] DEPARTMENT OF



       PATHOLOGY AND GENOMIC



       MEDICINE

 

 MPV  11.1  8.8 - 13.2 fL  [facility] DEPARTMENT OF



       PATHOLOGY AND GENOMIC



       MEDICINE

 

 Platelet count  147 (L)  150 - 400 k/uL  [facility] DEPARTMENT OF



       PATHOLOGY AND GENOMIC



       MEDICINE

 

 Nucleated RBC  1.00  /100 WBC  [facility] DEPARTMENT OF



       PATHOLOGY AND GENOMIC



       MEDICINE









 Performing Organization  Address  City/[facility] Centercode  Phone Number

 

 [facility] DEPARTMENT OF PATHOLOGY AND  [address] [place] [id number]  



 [facility]      



Vancomycin level, trough (2018  9:26 AM CDT)





 Component  Value  Ref Range  Performed At

 

 Vancomycin, trough  18.9  10.0 - 20.0 ug/mL  [facility] DEPARTMENT OF



   Comment:    PATHOLOGY AND GENOMIC



   Therapeutic Ranges:    MEDICINE



   Peak 30.0 - 40.0 ug/mL    



   Zpffzc21.0 - 20.0 ug/mL    



       









 Specimen

 

 Serum









 Performing Organization  Address  City/[facility] Centercode  Phone Number

 

 [facility] DEPARTMENT OF PATHOLOGY AND  [address] [place] [phone]  



 [facility] MEDICINE      



ECG 12 lead (2018  3:34 PM CDT)Only the most recent of4 resultswithin the 
time period is included.





 Component  Value  Ref Range  Performed At

 

 Ventricular rate  79    [facility]

 

 Atrial rate  79    [facility]

 

 SC interval  126    [facility]

 

 QRSD interval  82    [facility]

 

 QT interval  500    [facility]

 

 QTC interval  573    [facility]

 

 P axis 1  20    [facility]

 

 QRS axis 1  34    [facility]

 

 T wave axis  25    [facility]

 

 EKG impression  Normal sinus rhythm-Possible Inferior infarct (cited on or 
before 2018)-Nonspecific T wave abnormality-Prolonged QT-Abnormal ECG-In 
automated comparison with ECG of 15-JUL-2018 14:40,-QT has dakota    [facility] MUSE



   thened-Electronically Signed By Toy ePdro MD ([id number])    



    on 2018 8:11:16 PM    



       









 Performing Organization  Address  City/[facility]  Phone Number

 

 [facility] MUSE  [address] [place] [id number]  



Troponin (2018  3:04 PM CDT)





 Component  Value  Ref Range  Performed At

 

 Troponin  <0.30  0.00 - 0.30 ng/mL  [facility] DEPARTMENT OF PATHOLOGY



   Comment:    AND GENOMIC MEDICINE



   0.30 - 1.49 ng/mlMay indicate increased risk of acute
    



    coronary syndrome.
    



   >=1.5 ng/mlConsistent with acute myocardial    



    infarction.    



   
    



   The diagnostic value of a single normal or non-diagnostic    



   result is questionable.Serial samples at 2-6 hour intervals    



   are required to rule out acute myocardial injury.    



       









 Specimen

 

 Plasma specimen









 Performing Organization  Address  City/[facility]/Zipcode  Phone Number

 

 [facility] DEPARTMENT OF PATHOLOGY AND  [address] [place] [id number]  



 [facility] MEDICINE      



Vancomycin level, random (2018  5:07 PM CDT)





 Component  Value  Ref Range  Performed At

 

 Vancomycin, random  7.4  ug/mL  [facility] DEPARTMENT OF PATHOLOGY AND GENOMIC 
MEDICINE









 Specimen

 

 Serum









 Performing Organization  Address  City/[facility] Centercode  Phone Number

 

 [facility] DEPARTMENT OF PATHOLOGY AND  [address] [place] [phone] [place] [address]      



Ionized calcium (2018  5:12 AM CDT)Only the most recent of2 resultswithin 
the time period is included.





 Component  Value  Ref Range  Performed At

 

 pH  7.44    [facility] DEPARTMENT OF PATHOLOGY AND GENOMIC



       MEDICINE

 

 Ionized calcium  1.06 (L)  1.11 - 1.32 mmol/L  [facility] DEPARTMENT OF PATHOLOGY AND 
GENOMIC



       MEDICINE









 Specimen

 

 Plasma specimen









 Performing Organization  Address  City/[facility] Centercode  Phone Number

 

 [facility] DEPARTMENT OF PATHOLOGY AND  [address]      



XR Picc Chest Portable (2018 11:18 AM CDT)





 Narrative  Performed At

 

 EXAMINATION:XR PICC CHEST PORTABLE



   [facility]



  



  



 CLINICAL HISTORY:M41.9 Scoliosisunspecified, Multiple IV meds



  



  



  



 COMPARISON:Chest x-ray 7/15/2018



  



  



  



 IMPRESSION: Single frontal view reveals interval placement of a  



 left-sided PICC line with tip overlying the SVC. The remainder of the  



 examination is unchanged.



  



  



  



  



  



  



  



 [facility]-[id number]



  



   









 Procedure Note

 

  Interface, Radiology Results Incoming - 2018 11:23 AM CDT



EXAMINATION:  XR PICC CHEST PORTABLE



 



 CLINICAL HISTORY:  M41.9 Scoliosis  unspecified, Multiple IV meds



 



 COMPARISON:  Chest x-ray 7/15/2018



 



 IMPRESSION: Single frontal view reveals interval placement of a left-sided 
PICC line with tip overlying the SVC. The remainder of the examination is 
unchanged.



 



 



 



 [facility]-[id number]









 Performing Organization  Address  City/[facility] Cent[facility]ode  Phone Number

 

 [facility]  [address] [place] [address]  



PICC INSERTION (2018 10:34 AM CDT)





 Narrative  Performed At

 

 Justin Klein RN 2018 10:43 AM



  



 PICC insertion



  



 Date/Time: 2018 10:34 AM



  



 Performed by: CHARLOTTE IRBY



  



 Authorized by: PERRY PEREZ 



  



  



  



 Consent: 



  



 Consent obtained:Verbal



  



 Consent given by:Patient



  



 Risks discussed: arterial puncture, incorrect placement, nerve  



 damage, 



  



 infection, bleeding, deep vein thrombus and superficial thrombus



  



 Alternatives discussed:No treatment, alternative treatment,  



 delayed 



  



 treatment, observation and referral



  



 Universal protocol: 



  



 Procedure explained and questions answered to patient or proxy's 



  



 satisfaction: yes



  



 Relevant documents present and verified: yes



  



 Test results available and properly labeled: yes



  



 Imaging studies available: yes



  



 Required blood products, implants, devices, and special equipment 



  



 available: yes



  



 Site/side marked: yes



  



 Immediately prior to procedure, a time out was called: yes



  



 Patient identity confirmed:Verbally with patient, arm band and 



  



 hospital-assigned identification number



  



 Pre-procedure details: 



  



 Hand hygiene: Hand hygiene performed prior to insertion



  



 Sterile barrier technique: All elements of maximal sterile technique  



 



  



 followed



  



 Skin preparation:ChloraPrep



  



 Skin preparation agent: Skin preparation agent completely dried  



 prior to 



  



 procedure



  



 Anesthesia (see MAR for exact dosages): 



  



 Anesthesia method:Local infiltration



  



 Local anesthetic:Lidocaine 1% w/o epi



  



 PICC Line Placement Details (Will create an LDA): 



  



 Patient position:Flat



  



 Vessel Size (mm):3



  



 Indication:Known long term IV therapy



  



 Location:Left basilic



  



 Device Type:Non-valved



  



 Catheter size:5 Fr



  



 PICC Characteristics: 



  



 Catheter Brand:MePIN / [employer] picc



  



 External Catheter Length (cm):0



  



 Internal Catheter Length (cm):44



  



 Total Catheter Length (cm):44



  



 Catheter Lot Number:[id number]



  



 Catheter Expiration Date:1920



  



 Procedure Details: 



  



 Landmarks identified: yes



  



 Ultrasound guidance: yes



  



 Sterile ultrasound techniques: Sterile gel and sterile probe covers  



 were 



  



 used



  



 Number of attempts:1



  



 Number of PICC kits used during procedure:1



  



 Purpose of procedure:PICC Placement



  



 Successful PICC Placement: Yes



  



 Patency/Placement:Flushes without difficulty, flushed with 10 mL  



 



  



 normal saline, x-ray placement verified, injection cap placed and  



 positive 



  



 blood return



  



 PICC placed utlizing ultrasound-guided Modified Seldinger Technique:  



 Yes 



  



  



  



 Dressing/Securement:Antimicrobial dressing dry and intact,  



 catheter 



  



 securement device and antimicrobial dressing applied



  



 Blood Loss Amount:Less than 20 mL



  



 Post-Procedure Details: 



  



 Post-procedure:Dressing applied



  



 Tip placement confirmed by chest x-ray: Yes



  



 Patient tolerance of procedure:Tolerated well, no immediate 



  



 complications  



Intraoperative monitoring (2018  7:24 AM CDT)





 Narrative  Performed At

 

 This result has an attachment that is not available.



  



Prepare RBC, 1 Units (2018  6:40 AM CDT)Only the most recent of3 
resultswithin the time period is included.





 Component  Value  Ref Range  Performed At

 

 Product name  Apheresis Red Cell AS3 #1 LR    [facility] DEPARTMENT OF



       PATHOLOGY AND GENOMIC



       MEDICINE

 

 Unit number  [id number]    [facility] DEPARTMENT OF



       PATHOLOGY AND GENOMIC



       MEDICINE

 

 Product code  [id number]    [facility] DEPARTMENT OF



       PATHOLOGY AND GENOMIC



       MEDICINE

 

 Dispense status  Transfused    [facility] DEPARTMENT OF



       PATHOLOGY AND GENOMIC



       MEDICINE

 

 Blood expiration date  [id number]    [facility] DEPARTMENT OF



       PATHOLOGY AND GENOMIC



       MEDICINE

 

 Blood type code  5100    [facility] DEPARTMENT OF



       PATHOLOGY AND GENOMIC



       MEDICINE

 

 Blood type  O POSITIVE    [facility] DEPARTMENT OF



       PATHOLOGY AND GENOMIC



       MEDICINE









 Performing Organization  Address  City/[facility]/Zipcode  Phone Number

 

 [facility] DEPARTMENT OF PATHOLOGY AND  [address] [place] [address]  



 GENOMIC MEDICINE      



Type and screen (2018  6:40 AM CDT)Only the most recent of3 resultswithin 
the time period is included.





 Component  Value  Ref Range  Performed At

 

 ABO grouping  O    [facility] DEPARTMENT OF PATHOLOGY AND GENOMIC



       MEDICINE

 

 Rh type  POS    [facility] DEPARTMENT OF PATHOLOGY AND GENOMIC



       MEDICINE

 

 Antibody screen (gel)  NEG    [facility] DEPARTMENT OF PATHOLOGY AND GENOMIC



       MEDICINE









 Specimen

 

 Blood









 Performing Organization  Address  City/[facility]/Zipcode  Phone Number

 

 [facility] DEPARTMENT OF PATHOLOGY AND  [address] [place] [address]  



 GENOMIC MEDICINE      



US Hepatic (07/15/2018  4:00 PM CDT)





 Narrative  Performed At

 

 EXAMINATION:US HEPATIC



   [facility]



  



  



 CLINICAL HISTORY:Transaminitis



  



  



  



 COMPARISON:CT chest with contrast from 7/15/2018



  



  



  



 IMPRESSION:



  



  



  



 Liver: The liver is diffusely echogenic suggesting underlying fatty  



 infiltration. No intrahepatic biliary duct dilatation. No hepatic masses  



 are seen, however, evaluation is limited due to the diffuse increased  



 echogenicity. Liver is also noted to be 



  



 enlarged measuring 19 cm in greatest craniocaudal dimension.



  



  



  



 Portal vein: The portal vein is normal in size and demonstrates normal  



 hepatopedal flow. The diameter of the portal vein measures 1.1 cm.



  



  



  



 Gallbladder: The gallbladder is not visualized. Based on prior CT chest,  



 the patient is status post cholecystectomy.



  



  



  



 Common bile duct: 0.9 cm. This is likely within normal limits given  



 patient's history of cholecystectomy.



  



  



  



 No ascites.



  



  



  



  



  



 [id number]



  



   









 Procedure Note

 

  Interface, Radiology Results Incoming - 07/15/2018  4:27 PM CDT



EXAMINATION:  US HEPATIC



 



 CLINICAL HISTORY:  Transaminitis



 



 COMPARISON:  CT chest with contrast from 7/15/2018



 



 IMPRESSION:



 



 Liver: The liver is diffusely echogenic suggesting underlying fatty 
infiltration. No intrahepatic biliary duct dilatation. No hepatic masses are 
seen, however, evaluation is limited due to the diffuse increased



 echogenicity. Liver is also noted to be



 enlarged measuring 19 cm in greatest craniocaudal dimension.



 



 Portal vein: The portal vein is normal in size and demonstrates normal 
hepatopedal flow. The diameter of the portal vein measures 1.1 cm.



 



 Gallbladder: The gallbladder is not visualized. Based on prior CT chest, the 
patient is status post cholecystectomy.



 



 Common bile duct: 0.9 cm. This is likely within normal limits given patient's 
history of cholecystectomy.



 



 No ascites.



 



 



 [id number]









 Performing Organization  Address  City/[facility] Centercode  Phone Number

 

 [facility]  [address] [place] [address]  



XR Abdomen 1 Vw Portable (07/15/2018 12:38 PM CDT)Only the most recent of2 
resultswithin the time period is included.





 Narrative  Performed At

 

 EXAMINATION:XR ABDOMEN 1 VW PORTABLE



   [facility]



  



  



 CLINICAL HISTORY:Vomiting



  



  



  



 COMPARISON:None.



  



  



  



 FINDINGS:



  



  



  



 Extensive postoperative changes in the spine and hips.



  



 Stimulator device overlies the right lower quadrant. A catheter overlies  



 the left flank.



  



 There is gas in the colon. No disproportionate dilatation of small bowel  



 loops is seen.



  



  



  



 IMPRESSION:



  



 As above



  



  



  



 [facility]-[id number]



  



   









 Procedure Note

 

 Hm Interface, Radiology Results Incoming - 07/15/2018 12:43 PM CDT



EXAMINATION:  XR ABDOMEN 1 VW PORTABLE



 



 CLINICAL HISTORY:  Vomiting



 



 COMPARISON:  None.



 



 FINDINGS:



 



 Extensive postoperative changes in the spine and hips.



 Stimulator device overlies the right lower quadrant. A catheter overlies the 
left flank.



 There is gas in the colon. No disproportionate dilatation of small bowel loops 
is seen.



 



 IMPRESSION:



 As above



 



 [facility]-[id number]









 Performing Organization  Address  City/State/[facility]  Phone Number

 

 [facility]  [address] [place] [address]  



Arterial blood gas (07/15/2018 10:20 AM CDT)Only the most recent of7 
resultswithin the time period is included.





 Component  Value  Ref Range  Performed At

 

 pH, arterial  7.51 (H)  7.35 - 7.45  [facility] DEPARTMENT OF PATHOLOGY AND



       GENOMIC MEDICINE

 

 pCO2, arterial  31 (L)  35 - 45 mmHg  [facility] DEPARTMENT OF PATHOLOGY AND



       GENOMIC MEDICINE

 

 pO2, arterial  163 (H)  80 - 90 mmHg  [facility] DEPARTMENT OF PATHOLOGY AND



       GENOMIC MEDICINE

 

 Bicarbonate, arterial  24.3  21.0 - 28.0 mmol/L  [facility] DEPARTMENT OF PATHOLOGY 
AND



       GENOMIC MEDICINE

 

 Base excess, arterial  2  -2 - 2 mEq/L  [facility] DEPARTMENT OF PATHOLOGY AND



       GENOMIC MEDICINE

 

 O2 saturation, arterial  100  95 - 100 %  [facility] DEPARTMENT OF PATHOLOGY AND



       GENOMIC MEDICINE









 Specimen

 

 Blood









 Performing Organization  Address  City/[facility] Centercode  Phone Number

 

 [facility] DEPARTMENT OF PATHOLOGY AND  [address] [place] [address] MEDICINE      



 duplex venous lower extremity (07/15/2018  8:28 AM CDT)





 Narrative  Performed At

 

  



  [facility]



 Vascular Ultrasound Laboratory



  



 Lower Extremity Venous Report



  



  [address] [place] [address] [address], [place] [address]



  



  



  



 Pat.Name:GILDARDO RYAN.ID:[id number]  



  



  



 .Date: 7/15/2018 Refer.MD:PERRY PEREZ MD



  



 Exam Time: 8:02:00 AMStudy Type:LE  



 Venous 



  



 Height:67inWeight:  



 259lb 



  



 BSA: 2.26 m2  



 DOBAge:1961,57Y



  



 Sex: FEMALESonogrphr:  



 Tomer Edwards, TOO, JAVIER



  



 Pat. Stat.:Inpatient  



 Room:[place] [address] 



  



 TapeVol: SB, CPT - 4:  



 [address] 



  



 Echo Event ID:[id number]



  



 Order ID:[id number]



  



 Reason for Study:Tachypnea, evaluate for DVT. S/p stage II L2-3, L4-5



  



 lateral interbody fusion, T4-pelvis posterior fusion with



  



 instrumentation



  



 Procedures:Colorflow, Grayscale/2D, Pulsed wave Doppler



  



 Race:C 



  



 ------------------------------------



  



 SUMMARY:



  



 ------------------------------------



  



 DUPLEX SCAN OBSERVATIONS



  



  



  



  Deep VeinsSuperficial Veins



  



 RightLeft RightLeft



  



  GSV (prox) NormalNormal



  



  CFV Normal Normal (above knee)



  



  Femoral Normal Normal GSV (dist) Not Visualized Not Visualized



  



  Profunda Normal Normal (below knee)



  



  Popliteal Normal Normal



  



  PT (prox) Normal NormalSSV Not Visualized Not Visualized



  



  PT (dist) Normal Normal 



  



  Peroneal Normal Normal 



  



  



  



  RIGHT:There is normal compressibility with no evidence of echogenic



  



 material noted within the lumen of the visualized veins. Colorflow and



  



 Doppler signals are normal.



  



  



  



  LEFT: There is normal compressibility with no evidence of echogenic



  



 material noted within the lumen of the visualized veins. Colorflow and



  



 Doppler signals are normal. 



  



  



  



  PRELIMINARY FINDINGS



  



  1. No evidence of venous thrombosis in the above visualized veins. 



  



  



  



  PHYSICIAN INTERPRETATION 



  



  Venous examination of the both lower extremities demonstrated no



  



 evidence of venous thrombosis in the visualized veins.Normal



  



 compressibility and augmentation of all veins visualized.



  



  



  



 Signed 07/15/2018 01:28 PM



  



 Roger Jacinto MD, RPVI  









 Procedure Note

 

 Interface, Radiology Results In - 07/15/2018  1:28 PM CDT







                     Vascular Ultrasound Laboratory



                     Lower Extremity Venous Report



            [address] [address]



 



 Pat.Name:  GILDARDO RYAN          Pat.ID:    [id number]



 St.Date:   7/15/2018               Refer.MD:  PERRY PEREZ MD



 Exam Time: 8:02:00 AM              Study Type:LE Venous



 Height:    67in                    Weight:    259lb



 BSA:       2.26 m2                   Age:  1961,57Y



 Sex:       FEMALE                  Sonogrphr: TOO Jasmine RCS



 Pat. Stat.:Inpatient               Room:      [address]  Vol: SB,                     CPT - 4:   [address]



 Echo Event ID:[id number]



 Order ID:  [id number]



 Reason for Study:Tachypnea, evaluate for DVT. S/p stage II L2-3, L4-5



 lateral interbody fusion, T4-pelvis posterior fusion with



 instrumentation



 Procedures:Colorflow, Grayscale/2D, Pulsed wave Doppler



 Race:      C



 ------------------------------------



 SUMMARY:



 ------------------------------------



 DUPLEX SCAN OBSERVATIONS



 



    Deep Veins  Superficial Veins



   Right  Left Right  Left



      GSV (prox) Normal  Normal



  CFV Normal Normal (above knee)



  Femoral Normal Normal GSV (dist) Not Visualized Not Visualized



  Profunda Normal Normal (below knee)



  Popliteal Normal Normal



  PT (prox) Normal Normal  SSV Not Visualized Not Visualized



  PT (dist) Normal Normal



  Peroneal Normal Normal



 



  RIGHT:  There is normal compressibility with no evidence of echogenic



 material noted within the lumen of the visualized veins. Colorflow and



 Doppler signals are normal.



 



  LEFT: There is normal compressibility with no evidence of echogenic



 material noted within the lumen of the visualized veins. Colorflow and



 Doppler signals are normal.



 



  PRELIMINARY FINDINGS



  1. No evidence of venous thrombosis in the above visualized veins.



 



  PHYSICIAN INTERPRETATION



  Venous examination of the both lower extremities demonstrated no



 evidence of venous thrombosis in the visualized veins.  Normal



 compressibility and augmentation of all veins visualized.



 



 Signed 07/15/2018 01:28 PM



 Roger Jacinto MD, RPVI









 Performing Organization  Address  City/State/Zipcode  Phone Number

 

  [facility]  [address] [place] [address]  



CT Angiogram Pe Chest (07/15/2018  4:52 AM CDT)





 Narrative  Performed At

 

 EXAMINATION:CT ANGIOGRAM PE CHEST



   [facility]



  



  



 CLINICAL HISTORY: PE suspectedhigh pretest prob



  



  



  



 TECHNIQUE:CT angiographic images of the chest were obtained during  



 intravenous administration of iodinated contrast. Computerized  



 reformatted images and 3-D MIP images were also obtained and archived  



 (CT pulmonary embolus protocol).CT scans are 



  



 performed using radiation dose reduction techniques.Technical  



 factors are evaluated and adjusted to ensure appropriate moderation of  



 exposure.Automated dose management technology is applied to adjust  



 radiation exposure while achieving a diagnostic 



  



 quality image.



  



  



  



  



  



 COMPARISON:None.



  



  



  



 Findings:



  



  



  



 Suboptimal bolus limits evaluation for pulmonary embolus. Evaluation for  



 subsegmental branches of the pulmonary artery are nondiagnostic. No  



 central embolus is seen.



  



  



  



 No dissection or aneurysm is seen.



  



  



  



 Right lower lobe basilar dependent atelectasis is seen.



  



  



  



 No pulmonary mass or nodule is seen.



  



  



  



 No mediastinal hematoma or lymphadenopathy is seen.



  



  



  



 Visualized upper abdomen shows no acute abnormality. Cholecystectomy  



 clips are noted.



  



  



  



 Streak artifact from the bilateral pedicular screws are noted in the  



 thoracic spine.



  



  



  



  



  



 IMPRESSION: 



  



 1. Suboptimal bolus limits evaluation for pulmonary embolus. Evaluation  



 for subsegmental branches of the pulmonary artery are nondiagnostic. No  



 central embolus is seen.



  



 2. Right lower lobe basilar dependent atelectasis.



  



 3. Otherwise no acute abnormality identified in the chest.



  



  



  



 [facility]-[id number]



  



   









 Procedure Note

 

  Interface, Radiology Results Incoming - 07/15/2018  5:03 AM CDT



EXAMINATION:  CT ANGIOGRAM PE CHEST



 



 CLINICAL HISTORY: PE suspected  high pretest prob



 



 TECHNIQUE:  CT angiographic images of the chest were obtained during 
intravenous administration of iodinated contrast. Computerized reformatted 
images and 3-D MIP images were also obtained and archived (CT pulmonary embolus 
protocol).  CT scans are



 performed using radiation dose reduction techniques.  Technical factors are 
evaluated and adjusted to ensure appropriate moderation of exposure.  Automated 
dose management technology is applied to adjust radiation



 exposure while achieving a diagnostic



 quality image.



 



 



 COMPARISON:  None.



 



 Findings:



 



 Suboptimal bolus limits evaluation for pulmonary embolus. Evaluation for 
subsegmental branches of the pulmonary artery are nondiagnostic. No central 
embolus is seen.



 



 No dissection or aneurysm is seen.



 



 Right lower lobe basilar dependent atelectasis is seen.



 



 No pulmonary mass or nodule is seen.



 



 No mediastinal hematoma or lymphadenopathy is seen.



 



 Visualized upper abdomen shows no acute abnormality. Cholecystectomy clips are 
noted.



 



 Streak artifact from the bilateral pedicular screws are noted in the thoracic 
spine.



 



 



 IMPRESSION:



 1. Suboptimal bolus limits evaluation for pulmonary embolus. Evaluation for 
subsegmental branches of the pulmonary artery are nondiagnostic. No central 
embolus is seen.



 2. Right lower lobe basilar dependent atelectasis.



 3. Otherwise no acute abnormality identified in the chest.



 



 [facility]-[id number]









 Performing Organization  Address  City/[facility]/Zipcode  Phone Number

 

 [facility]  [address] [place] [address]  



Blood culture, aerobic &amp; anaerobic (07/15/2018  2:00 AM CDT)Only the most 
recent of2 resultswithin the time period is included.





 Component  Value  Ref Range  Performed At

 

 Blood culture isolate  No growth after 5 days of incubation.    [facility] DEPARTMENT 
OF



   Comment:    PATHOLOGY AND GENOMIC



   Specimen Information    MEDICINE



   Specimen Source: Blood    



   Specimen Site: Antecubital Right    



       









 Specimen

 

 Blood









 Performing Organization  Address  City/[facility]  Phone Number

 

 [facility] DEPARTMENT OF PATHOLOGY AND  [address] [place] [id number]  



 GENOMIC MEDICINE      



Manual differential (07/15/2018 12:10 AM CDT)





 Component  Value  Ref Range  Performed At

 

 Manual differential  PERFORMED    [facility] DEPARTMENT OF PATHOLOGY AND



       GENOMIC MEDICINE

 

 Neutrophils  57.0  39.0 - 69.0 %  [facility] DEPARTMENT OF PATHOLOGY AND



       GENOMIC MEDICINE

 

 Lymphocytes  17.0 (L)  25.0 - 45.0 %  [facility] DEPARTMENT OF PATHOLOGY AND



       GENOMIC MEDICINE

 

 Monocytes  26.0 (H)  0.0 - 10.0 %  [facility] DEPARTMENT OF PATHOLOGY AND



       GENOMIC MEDICINE

 

 Eosinophils  0.0  0.0 - 5.0 %  [facility] DEPARTMENT OF PATHOLOGY AND



       GENOMIC MEDICINE

 

 Basophils  0.0  0.0 - 1.0 %  [facility] DEPARTMENT OF PATHOLOGY AND



       GENOMIC MEDICINE

 

 Metamyelocytes  0  %  [facility] DEPARTMENT OF PATHOLOGY AND



       GENOMIC MEDICINE

 

 Promyelocytes  0  %  [facility] DEPARTMENT OF PATHOLOGY AND



       GENOMIC MEDICINE

 

 Platelet slide review  Solitario slt decr    [facility] DEPARTMENT OF PATHOLOGY AND



       GENOMIC MEDICINE









 Performing Organization  Address  City/[facility]  Phone Number

 

 [facility] DEPARTMENT OF PATHOLOGY AND  [address] [place] [address]  



 GENOMIC MEDICINE      



Hemoglobin &amp; hematocrit (2018  4:00 PM CDT)





 Component  Value  Ref Range  Performed At

 

 HGB  6.9 (LL)  12.0 - 16.0 g/dL  [facility] DEPARTMENT OF PATHOLOGY



   Comment:    AND GENOMIC MEDICINE



   HGB results called to and read back by BRUNO TABARES/DEBORAH(name/location) at
    



   201816:18 (date/time) by PC.    



       

 

 HCT  20.4 (L)  37.0 - 47.0 %  [facility] DEPARTMENT OF PATHOLOGY



       AND GENOMIC MEDICINE









 Specimen

 

 Blood









 Performing Organization  Address  City/State/Zipcode  Phone Number

 

 [facility] DEPARTMENT OF PATHOLOGY AND  [address] [place] [address]  



 [facility] MEDICINE      



CT Head Wo Contrast (2018  9:08 PM CDT)





 Narrative  Performed At

 

 EXAMINATION:CT HEAD WO CONTRAST



   [facility]



  



  



 CT IMAGING WAS PERFORMED WITH ITERATIVE RECONSTRUCTION TECHNIQUE AND/OR  



 AUTOMATED EXPOSURE CONTROL TO REDUCE RADIATION DOSE.



  



  



  



 CLINICAL HISTORY:unable to move right arm



  



  



  



 COMPARISON:None.



  



  



  



  



  



 FINDINGS:



  



  



  



 1. There is no acute intracranial abnormality. Specifically there is  



 no intracranial hemorrhage, mass effect or acute infarction.



  



  



  



 2.There are minimal if any nonspecific cerebral white matter  



 microvascular changes. There is mild to moderate cerebral cortical  



 volume loss and mild cerebellar volume loss.



  



  



  



 3.There is a minimal atherosclerotic calcification in the distal  



 internal carotid arteries.



  



  



  



 4.There are small mucous fluid levels in the maxillary sinuses  



 bilaterally larger on the right side and in the sphenoid sinus  



 bilaterally greater on the left side. There is very mild mucosal  



 thickening/mucus in the ethmoid sinus. Mastoids are clear. 



  



 There is a 6 mm calcified wall cystic structure in the posterior  



 inferior aspect of the maxillary sinus on the right probably of a  



 dentigerous origin and of no significance.



  



  



  



  



  



 IMPRESSION:



  



  



  



 No acute abnormality demonstrated. MRI of the brain is suggested if  



 further evaluation is clinically indicated.



  



 [facility]-[id number]



  



   









 Procedure Note

 

  Interface, Radiology Results Incoming - 2018  9:15 PM CDT



EXAMINATION:  CT HEAD WO CONTRAST



 



 CT IMAGING WAS PERFORMED WITH ITERATIVE RECONSTRUCTION TECHNIQUE AND/OR 
AUTOMATED EXPOSURE CONTROL TO REDUCE RADIATION DOSE.



 



 CLINICAL HISTORY:    unable to move right arm



 



 COMPARISON:  None.



 



 



 FINDINGS:



 



 1.   There is no acute intracranial abnormality. Specifically there is no 
intracranial hemorrhage, mass effect or acute infarction.



 



 2.  There are minimal if any nonspecific cerebral white matter microvascular 
changes. There is mild to moderate cerebral cortical volume loss and mild 
cerebellar volume loss.



 



 3.  There is a minimal atherosclerotic calcification in the distal internal 
carotid arteries.



 



 4.  There are small mucous fluid levels in the maxillary sinuses bilaterally 
larger on the right side and in the sphenoid sinus bilaterally greater on the 
left side. There is very mild mucosal thickening/mucus in



 the ethmoid sinus. Mastoids are clear.



 There is a 6 mm calcified wall cystic structure in the posterior inferior 
aspect of the maxillary sinus on the right probably of a dentigerous origin and 
of no significance.



 



 



 IMPRESSION:



 



 No acute abnormality demonstrated. MRI of the brain is suggested if further 
evaluation is clinically indicated.



 [facility]-[id number]









 Performing Organization  Address  City/[facility]/Zipcode  Phone Number

 

 [address] [address]  



Sodium level, syringe (2018  5:05 PM CDT)Only the most recent of6 
resultswithin the time period is included.





 Component  Value  Ref Range  Performed At

 

 Sodium, syringe  135  135 - 148 mEq/L  [facility] DEPARTMENT OF PATHOLOGY AND GENOMIC



       MEDICINE









 Specimen

 

 Blood









 Performing Organization  Address  City/[facility]  Phone Number

 

 [facility] DEPARTMENT OF PATHOLOGY AND  [address] [place] [address] MEDICINE      



Potassium, syringe (2018  5:05 PM CDT)Only the most recent of6 
resultswithin the time period is included.





 Component  Value  Ref Range  Performed At

 

 Potassium, syringe  3.8  3.5 - 5.0 mEq/L  [facility] DEPARTMENT OF PATHOLOGY AND 
GENOMIC



       MEDICINE









 Specimen

 

 Blood









 Performing Organization  Address  City/[facility] Cent[facility]ode  Phone Number

 

 [facility] DEPARTMENT OF PATHOLOGY AND  [address] [place] [address] MEDICINE      



Lactic acid, syringe (2018  5:05 PM CDT)Only the most recent of6 
resultswithin the time period is included.





 Component  Value  Ref Range  Performed At

 

 Lactic acid, syringe  1.3  0.5 - 2.2 mmol/L  [facility] DEPARTMENT OF PATHOLOGY AND 
GENOMIC



       MEDICINE









 Specimen

 

 Blood









 Performing Organization  Address  City/[facility]/Zipcode  Phone Number

 

 [facility] DEPARTMENT OF PATHOLOGY AND  [address] [place] [address] MEDICINE      



Ionized calcium, arterial (2018  5:05 PM CDT)Only the most recent of6 
resultswithin the time period is included.





 Component  Value  Ref Range  Performed At

 

 Ionized calcium,  0.93 (L)Comment:  1.11 - 1.32 mmol/L  [facility] DEPARTMENT OF



 arterial  Specimen is slightly    PATHOLOGY AND GENOMIC



   hemolyzed.  Interpret    MEDICINE



   results accordingly.    









 Specimen

 

 Blood









 Performing Organization  Address  City/[facility] Centercode  Phone Number

 

 [facility] DEPARTMENT OF PATHOLOGY AND  [address] [place] [address]      



Hemoglobin, syringe (2018  5:05 PM CDT)Only the most recent of6 
resultswithin the time period is included.





 Component  Value  Ref Range  Performed At

 

 Hemoglobin, syringe  9.2 (L)  12.0 - 16.0 g/dL  [facility] DEPARTMENT OF PATHOLOGY 
AND GENOMIC



       MEDICINE









 Specimen

 

 Blood









 Performing Organization  Address  City/[facility] Centercode  Phone Number

 

 [facility] DEPARTMENT OF PATHOLOGY AND  [address] [place] [address] MEDICINE      



Glucose level, syringe (2018  5:05 PM CDT)Only the most recent of6 
resultswithin the time period is included.





 Component  Value  Ref Range  Performed At

 

 Glucose, syringe  193 (H)  65 - 99 mg/dL  [facility] DEPARTMENT OF PATHOLOGY AND 
GENOMIC



       MEDICINE









 Specimen

 

 Blood









 Performing Organization  Address  City/State/[facility]  Phone Number

 

 [facility] DEPARTMENT  PATHOLOGY AND  [address] [place] [address]      



Arterial blood gas, corrected (2018  5:05 PM CDT)Only the most recent of6 
resultswithin the time period is included.





 Component  Value  Ref Range  Performed At

 

 pH, arterial  7.35  7.35 - 7.45  [facility] DEPARTMENT OF PATHOLOGY AND GENOMIC



       MEDICINE

 

 pCO2, arterial  40  35 - 45 mmHg  [facility] DEPARTMENT OF PATHOLOGY AND GENOMIC



       MEDICINE

 

 pO2, arterial  189 (H)  80 - 90 mmHg  [facility] DEPARTMENT OF PATHOLOGY AND GENOMIC



       MEDICINE

 

 Temperature, Celsius  37.0  Degrees C  [facility] DEPARTMENT OF PATHOLOGY AND GENOMIC



       MEDICINE

 

 O2 saturation, arterial  100  95 - 100 %  [facility] DEPARTMENT OF PATHOLOGY AND 
GENOMIC



       MEDICINE

 

 pH, arterial corrected  7.35    [facility] DEPARTMENT OF PATHOLOGY AND GENOMIC



       MEDICINE

 

 pCO2, arterial corrected  40  mmHg  [facility] DEPARTMENT OF PATHOLOGY AND GENOMIC



       MEDICINE

 

 pO2, arterial corrected  189  mmHg  [facility] DEPARTMENT OF PATHOLOGY AND GENOMIC



       MEDICINE

 

 Base excess, arterial  -4 (L)  -2 - 2 mEq/L  [facility] DEPARTMENT OF PATHOLOGY AND 
GENOMIC



       MEDICINE









 Specimen

 

 Blood









 Performing Organization  Address  City/[facility]  Phone Number

 

 [facility] DEPARTMENT OF PATHOLOGY AND  [address] [place] [address]  



 GENOMIC MEDICINE      



OR FL &gt; I Hour (2018  4:41 PM CDT)Only the most recent of3 
resultswithin the time period is included.





 Narrative  Performed At

 

 EXAMINATION:OR FL 1 HOUR



  [facility]



  



  



 C-arm fluoroscopy was requested in OR.



  



  



  



 Location: [facility] OR room#19



  



 O-ARM



  



 Scans:4



  



 Procedure:L2-L3, L4-L5 LATERAL INTERBODY FUSION W/ INSTRUMENTATION;  



 T4-PELVIS POSTERIOR FUSION W/ INSTRUMENTATION



  



 Start:7:32pm



  



 End:4:41pm



  



 Fluoro Time:12.17sec



  



 Dose(mGy):157.22



  



 Tech:QTN/MA



  



 Date:18



  



  



  



 IMPRESSION:



  



  



  



 Separate operative report will be issued by the physician performing the  



 procedure.



  



  



  



  



  



  



  



 [id number]



  



   









 Procedure Note

 

  Interface, Radiology Results Incoming - 2018 10:11 PM CDT



EXAMINATION:  OR FL   1 HOUR



 



 C-arm fluoroscopy was requested in OR.



 



 Location: [facility] OR room#19



 O-ARM



 Scans:4



 Procedure:L2-L3, L4-L5 LATERAL INTERBODY FUSION W/ INSTRUMENTATION; T4-PELVIS 
POSTERIOR FUSION W/ INSTRUMENTATION



 Start:7:32pm



 End:4:41pm



 Fluoro Time:12.17sec



 Dose(mGy):157.22



 Tech:QTN/MA



 Date:18



 



 IMPRESSION:



 



 Separate operative report will be issued by the physician performing the 
procedure.



 



 



 



 [id number]









 Performing Organization  Address  City/State/Zipcode  Phone Number

 

  [facility]  [address] [place] [address]  



XR Lumbar Spine 1 Vw (2018  4:31 PM CDT)





 Narrative  Performed At

 

 EXAMINATION: XR LUMBAR SPINE 1 VW



   [facility]



  



  



 CLINICAL HISTORY: Lumbar region back pain and radiculopathy



  



  



  



 COMPARISON:None



  



  



  



 Findings:



  



  



  



 There is posterior fusion from the upper sacral region into the thoracic  



 region. The upper extent is not seen on this exam. There is multilevel  



 pedicle screws connected by vertical rods with areas of laminectomy.  



 There is anterior fusion material at 



  



 L5-S1, L4-5 and L2-3. There are degenerative changes in the lumbar and  



 lower thoracic spine. There is increased lumbar lordosis. There are  



 multiple radiopaque wires and instruments.



  



  



  



 IMPRESSION:



  



  



  



 Lateral intraoperative radiograph of the lumbar spine, sacrum and lower  



 thoracic spine for localization during surgery.



  



  



  



  



  



  



  



  



  



  



  



  



  



  



  



 [id number]



  



   









 Procedure Note

 

  Interface, Radiology Results Incoming - 2018  4:40 PM CDT



EXAMINATION: XR LUMBAR SPINE 1 VW



 



 CLINICAL HISTORY: Lumbar region back pain and radiculopathy



 



 COMPARISON:  None



 



 Findings:



 



 There is posterior fusion from the upper sacral region into the thoracic 
region. The upper extent is not seen on this exam. There is multilevel pedicle 
screws connected by vertical rods with areas of laminectomy. There is anterior 
fusion material at



 L5-S1, L4-5 and L2-3. There are degenerative changes in the lumbar and lower 
thoracic spine. There is increased lumbar lordosis. There are multiple 
radiopaque wires and instruments.



 



 IMPRESSION:



 



 Lateral intraoperative radiograph of the lumbar spine, sacrum and lower 
thoracic spine for localization during surgery.



 



 



 



 



 



 



 



 [facility]-[id number]









 Performing Organization  Address  City/State/Zipcode  Phone Number

 

  [facility]  [address] [place] [address]  



XR Cervical Spine 1 Vw (2018  4:31 PM CDT)





 Narrative  Performed At

 

 EXAMINATION: XR CERVICAL SPINE 1 VW



   [facility]



  



  



 CLINICAL HISTORY: Cervical region neck pain and radiculopathy



  



  



  



 COMPARISON:None



  



  



  



 Findings:



  



  



  



 Single AP intraoperative x-ray shows an endotracheal tube with the tip  



 at the level of the clavicles. There is a tube with a radiopaque linear  



 density just to the left of this region. There is a nonspecific  



 radiopaque density foreign body overlapping the



  



  right posterior medial T3 rib. There is posterior fusion from the  



 approximately the T4 level extending downwards. Overlying structures  



 obscure detail of the upper thoracic spine. The inferior extent is not  



 seen on this exam. There is anterior fusion 



  



 with plate and screws in the lower cervical region.



  



  



  



  



  



  



  



 IMPRESSION:



  



  



  



 AP intraoperative radiograph of the cervical and upper thoracic spine  



 for localization during surgery.



  



  



  



  



  



  



  



  



  



  



  



  



  



  



  



 [facility]-[id number]



  



   









 Procedure Note

 

  Interface, Radiology Results Incoming - 2018  4:38 PM CDT



EXAMINATION: XR CERVICAL SPINE 1 VW



 



 CLINICAL HISTORY: Cervical region neck pain and radiculopathy



 



 COMPARISON:  None



 



 Findings:



 



 Single AP intraoperative x-ray shows an endotracheal tube with the tip at the 
level of the clavicles. There is a tube with a radiopaque linear density just 
to the left of this region. There is a nonspecific radiopaque



 density foreign body overlapping the



  right posterior medial T3 rib. There is posterior fusion from the 
approximately the T4 level extending downwards. Overlying structures obscure 
detail of the upper thoracic spine. The inferior extent is not



 seen on this exam. There is anterior fusion



 with plate and screws in the lower cervical region.



 



 



 



 IMPRESSION:



 



 AP intraoperative radiograph of the cervical and upper thoracic spine for 
localization during surgery.



 



 



 



 



 



 



 



 [facility]-[id number]









 Performing Organization  Address  City/[facility] Centercode  Phone Number

 

 [facility]  [address] [place] [id number]  



Surgical pathology request (2018  7:41 AM CDT)Only the most recent of2 
resultswithin the time period is included.





 Component  Value  Ref Range  Performed At

 

 Case number  [id number]    [facility] DEPARTMENT OF



       PATHOLOGY AND GENOMIC



       MEDICINE

 

 Surgical pathology report  See link below for PDF    [facility] DEPARTMENT OF



   Lab Report    PATHOLOGY AND GENOMIC



       MEDICINE

 

 Result status  This is Final Report to    [facility] DEPARTMENT OF



   [id number]    PATHOLOGY AND GENOMIC



       MEDICINE









 Performing Organization  Address  City/[facility] Centercode  Phone Number

 

 [facility] DEPARTMENT OF PATHOLOGY AND  [address] [place] [address]  



 GENOMIC [facility]      



Partial thromboplastin time, activated (2018  4:28 AM CDT)Only the most 
recent of3 resultswithin the time period is included.





 Component  Value  Ref Range  Performed At

 

 PTT  35.9  23.0 - 36.0 sec  [facility] DEPARTMENT OF PATHOLOGY



   Comment:    AND GENOMIC [facility]



   PTT therapeutic range for unfractionated heparin is    



   61.0-112.0 seconds which corresponds to Anti-Xa    



   0.3-0.7 U/ml.    



       









 Specimen

 

 Blood









 Performing Organization  Address  City/State/[facility]  Phone Number

 

 [facility] DEPARTMENT OF PATHOLOGY AND  [address] [place] [address]      



Prothrombin time with INR (2018  4:28 AM CDT)Only the most recent of3 
resultswithin the time period is included.





 Component  Value  Ref Range  Performed At

 

 Prothrombin time  13.8  12.0 - 15.0 sec  [facility] DEPARTMENT OF



       PATHOLOGY AND GENOMIC



       MEDICINE

 

 INR  1.0    [facility] DEPARTMENT OF



   Comment:    PATHOLOGY AND GENOMIC



   The International Normalized Ratio (INR) is a therapeutic    MEDICINE



   monitoring tool for patients who are stable on oral    



   anticoagulant therapy. An INR of 2.0-3.0 is suggested for deep    



   vein thrombosis/pulmonary embolism.    



       









 Specimen

 

 Blood









 Performing Organization  Address  City/[facility]/Zipcode  Phone Number

 

 [facility] DEPARTMENT OF PATHOLOGY AND  [address] [place], [place] [id number]  



 GENOMIC MEDICINE      



Hemoglobin (2018  4:00 AM CDT)





 Component  Value  Ref Range  Performed At

 

 HGB  10.3 (L)  12.0 - 16.0 g/dL  [facility] DEPARTMENT OF PATHOLOGY AND GENOMIC 
MEDICINE









 Specimen

 

 Blood









 Performing Organization  Address  City/[facility]/Zipcode  Phone Number

 

 [facility] DEPARTMENT OF PATHOLOGY AND  [address] [place], [place] [id number]  



 [facility]      



CT Thoracic Spine Wo Contrast (07/10/2018  5:00 PM CDT)





 Narrative  Performed At

 

 Study: CT THORACIC SPINE WO CONTRAST.



   [facility]



  



  



 HISTORY: surgical planning.



  



  



  



 COMPARISON:None.



  



  



  



 TECHNIQUE: Multiple axial CT images of the thoracic spine performed  



 without intravenous contrast. Sagittal and coronal reconstructions  



 performed.



  



  



  



 CT imaging was performed with iterative reconstruction technique and/or  



 automated exposure control to reduce radiation dose.



  



  



  



 FINDINGS:



  



  



  



 Mineralization is low normal. There is a curvature of the thoracic spine  



 left convex at the level of T4 and right convex at the level of T9. No  



 lateral listhesis. There is been prior posterior element surgery from T5  



 to T12. There is fusion of facet 



  



 joints bilaterally at these levels. No acute fracture or erosions. There  



 is generalized mild disc space loss. No obvious disc protrusion present.  



 There is no significant canal or foraminal stenosis within limitations  



 of CT. The tip of the epidural 



  



 electrode is seen posteriorly at the level of T11.



  



  



  



 Paravertebral soft tissues are unremarkable. There is some mild  



 atelectasis is of the dependent portions of the lungs. There is some  



 pleural calcification on the right.



  



  



  



 IMPRESSION:



  



  



  



 S-shaped scoliosis of the thoracic spine with some prior posterior  



 element surgery at mid to lower segments and fusion of the facet joints  



 bilaterally at these levels.



  



  



  



  



  



  



  



 [facility]-[id number]



  



   









 Procedure Note

 

 Hm Interface, Radiology Results Incoming - 07/10/2018  5:44 PM CDT



Study: CT THORACIC SPINE WO CONTRAST.



 



 HISTORY: surgical planning.



 



 COMPARISON:None.



 



 TECHNIQUE: Multiple axial CT images of the thoracic spine performed without 
intravenous contrast. Sagittal and coronal reconstructions performed.



 



 CT imaging was performed with iterative reconstruction technique and/or 
automated exposure control to reduce radiation dose.



 



 FINDINGS:



 



 Mineralization is low normal. There is a curvature of the thoracic spine left 
convex at the level of T4 and right convex at the level of T9. No lateral 
listhesis. There is been prior posterior element surgery from T5 to T12. There 
is fusion of facet



 joints bilaterally at these levels. No acute fracture or erosions. There is 
generalized mild disc space loss. No obvious disc protrusion present. There is 
no significant canal or foraminal stenosis within limitations of CT. The tip of 
the epidural



 electrode is seen posteriorly at the level of T11.



 



 Paravertebral soft tissues are unremarkable. There is some mild atelectasis is 
of the dependent portions of the lungs. There is some pleural calcification on 
the right.



 



 IMPRESSION:



 



 S-shaped scoliosis of the thoracic spine with some prior posterior element 
surgery at mid to lower segments and fusion of the facet joints bilaterally at 
these levels.



 



 



 



 [facility]-[id number]









 Performing Organization  Address  City/State/Zipcode  Phone Number

 

  [facility]  [address] [place] [address]  



CT Lumbar Spine Wo Contrast (07/10/2018  4:59 PM CDT)





 Narrative  Performed At

 

 Study: CT LUMBAR SPINE WO CONTRAST.



   [facility]



  



  



 HISTORY: surgical planning.



  



  



  



 COMPARISON:None.



  



  



  



 TECHNIQUE: Multiple axial CT images of the lumbar spine performed  



 without intravenous contrast. Sagittal and coronal reconstructions  



 performed.



  



  



  



 CT imaging was performed with iterative reconstruction technique and/or  



 automated exposure control to reduce radiation dose.



  



  



  



 FINDINGS:



  



  



  



 Mineralization is low normal. There is a left convex curvature of the  



 lumbar spine centered at L3. No lateral listhesis. No anterior  



 subluxations. Vertebral body heights are within normal limits. There is  



 no acute fracture. There are some postoperative 



  



 changes of prior laminectomy at L1. The spinal canal electrode appears  



 more intrathecal in the lumbar spine when compared to the recent  



 thoracic spine. There are posterior changes of L5/S1 interbody fusion.  



 No erosions.



  



  



  



 Mild disc space loss at L1-2 without disc protrusion, canal stenosis,  



 foraminal stenosis.



  



 Severe disc space loss at L2-3 with a broad shallow disc protrusion. No  



 significant canal stenosis. Moderate bilateral foraminal stenosis facet  



 arthrosis.



  



 Severe disc space loss at L3-4 without obvious disc protrusion. No  



 significant canal stenosis. Facet arthrosis result in mild left and  



 moderate right foraminal stenosis.



  



 Moderate disc space loss at L4-5. Broad shallow disc bulge and mild  



 canal stenosis. Facet arthrosis present with moderate bilateral  



 foraminal stenosis.



  



 Postoperative changes at L5/S1 as described above. There is no canal  



 stenosis. Moderate left foraminal stenosis from facet arthrosis. Right  



 foramen is patent.



  



  



  



 Paravertebral soft tissues are unremarkable. No fluid collection. Small  



 foci of air in the retroperitoneum likely postoperative.



  



  



  



  



  



 IMPRESSION:



  



  



  



 Expected postoperative changes.



  



  



  



 Degenerative changes as above.



  



  



  



 [facility]-[id number]



  



   









 Procedure Note

 

  Interface, Radiology Results Incoming - 07/10/2018  5:52 PM CDT



Study: CT LUMBAR SPINE WO CONTRAST.



 



 HISTORY: surgical planning.



 



 COMPARISON:None.



 



 TECHNIQUE: Multiple axial CT images of the lumbar spine performed without 
intravenous contrast. Sagittal and coronal reconstructions performed.



 



 CT imaging was performed with iterative reconstruction technique and/or 
automated exposure control to reduce radiation dose.



 



 FINDINGS:



 



 Mineralization is low normal. There is a left convex curvature of the lumbar 
spine centered at L3. No lateral listhesis. No anterior subluxations. Vertebral 
body heights are within normal limits. There is no



 acute fracture. There are some postoperative



 changes of prior laminectomy at L1. The spinal canal electrode appears more 
intrathecal in the lumbar spine when compared to the recent thoracic spine. 
There are posterior changes of L5/S1 interbody fusion. No erosions.



 



 Mild disc space loss at L1-2 without disc protrusion, canal stenosis, 
foraminal stenosis.



 Severe disc space loss at L2-3 with a broad shallow disc protrusion. No 
significant canal stenosis. Moderate bilateral foraminal stenosis facet 
arthrosis.



 Severe disc space loss at L3-4 without obvious disc protrusion. No significant 
canal stenosis. Facet arthrosis result in mild left and moderate right 
foraminal stenosis.



 Moderate disc space loss at L4-5. Broad shallow disc bulge and mild canal 
stenosis. Facet arthrosis present with moderate bilateral foraminal stenosis.



 Postoperative changes at L5/S1 as described above. There is no canal stenosis. 
Moderate left foraminal stenosis from facet arthrosis. Right foramen is patent.



 



 Paravertebral soft tissues are unremarkable. No fluid collection. Small foci 
of air in the retroperitoneum likely postoperative.



 



 



 IMPRESSION:



 



 Expected postoperative changes.



 



 Degenerative changes as above.



 



 [facility]-[id number]









 Performing Organization  Address  City/[facility]/Zipcode  Phone Number

 

  [facility]  [address] [place] [address]  



Lactic acid level (07/10/2018  3:00 PM CDT)





 Component  Value  Ref Range  Performed At

 

 Lactic acid  3.9 (HH)  0.5 - 2.2 mmol/L  [facility] DEPARTMENT OF PATHOLOGY AND 
GENOMIC



       MEDICINE









 Specimen

 

 Blood









 Performing Organization  Address  City/State/Zipcode  Phone Number

 

 [facility] DEPARTMENT OF PATHOLOGY AND  [address] [place] [address]  



 GENOMIC MEDICINE      



XR Chest 2 Vw (2018  5:24 PM CDT)





 Narrative  Performed At

 

 Examination: XR CHEST 2 VW



   [facility]



  



  



 Clinical history: Z01.818 Encounter for other preprocedural examination,  



 PREOP



  



  



  



 Comparison: None



  



  



  



 Impression:



  



  



  



  



  



 1. The heart and pulmonary vasculature are within normal limits.



  



 2. No infiltrate or effusion is demonstrated. Right middle lobe linear  



 atelectasis is likely chronic.



  



 3. There is no acute osseous pathology. Thoracolumbar scoliosis distorts  



 the mediastinal anatomy.



  



  



  



  



  



 CONCLUSION: NO RADIOGRAPHIC EVIDENCE OF ACUTE CARDIOPULMONARY  



 ABNORMALITY.



  



 



  



  



  



  



  



  



  



  



  



  



  



 [facility]-[id number]



  



   









 Procedure Note

 

 Hm Interface, Radiology Results Incoming - 2018  5:50 PM CDT



Examination: XR CHEST 2 VW



 



 Clinical history: Z01.818 Encounter for other preprocedural examination, PREOP



 



 Comparison: None



 



 Impression:



 



 



 1. The heart and pulmonary vasculature are within normal limits.



 2. No infiltrate or effusion is demonstrated. Right middle lobe linear 
atelectasis is likely chronic.



 3. There is no acute osseous pathology. Thoracolumbar scoliosis distorts the 
mediastinal anatomy.



 



 



 CONCLUSION: NO RADIOGRAPHIC EVIDENCE OF ACUTE CARDIOPULMONARY ABNORMALITY.



 



 



 



 



 



 



 [facility]-[id number]









 Performing Organization  Address  City/[facility]/Zipcode  Phone Number

 

  [facility]  [address] [place] [address]  



Vitamin D 25 hydroxy level (2018  4:21 PM CDT)





 Component  Value  Ref Range  Performed At

 

 Vitamin D, 25-hydroxy  42.7  30.0 - 150.0  [facility] DEPARTMENT OF



   Comment:  ng/mL  PATHOLOGY AND GENOMIC



   This assay reports the sum of 25-hydroxy vitamin D3 and 25-hydroxy vitamin  
  MEDICINE



   D2.    



   Reference range:    



   0-17 years:    



   Deficiency: less than 20ng/mL    



   Optimum level: greater than or equal to 20 ng/mL.    



   18 years and older:    



   Deficiency: less than 20ng/mL    



   Insufficiency: 20-29 ng/mL    



   Optimum Level: 30-80 ng/mL    



   The assay reportable range is 3.4155.9 ng/mL. Levels higher than 150    



   ng/mL may be associated with toxicity.    



   If toxicity is clinically suspected and the reported result is >155.9    



   ng/mL,contact lab for alternative methods to obtain a definitivelevel.  
  



   If separate quantitation of 25-hydroxy vitamin D3 and 25-hydroxy vitamin D2 
   



   is needed, please contact lab for alternative methods.    



       









 Specimen

 

 Blood









 Performing Organization  Address  City/State/Zipcode  Phone Number

 

 [facility] DEPARTMENT OF PATHOLOGY AND  [address] [place] [address]  



 GENOMIC MEDICINE      



Parathyroid hormone (2018  4:21 PM CDT)





 Component  Value  Ref Range  Performed At

 

 PTH  55  15 - 65 pg/mL  [facility] DEPARTMENT OF PATHOLOGY AND GENOMIC MEDICINE









 Specimen

 

 Blood









 Performing Organization  Address  City/[facility]/Zipcode  Phone Number

 

 [facility] DEPARTMENT OF PATHOLOGY AND  [address] [place] [address]  



 [facility]      



Hepatic function panel (2018  4:21 PM CDT)





 Component  Value  Ref Range  Performed At

 

 Albumin  3.6  3.5 - 5.0 g/dL  [facility] DEPARTMENT OF



       PATHOLOGY AND GENOMIC



       MEDICINE

 

 Total bilirubin  <0.2  0.0 - 1.2 mg/dL  [facility] DEPARTMENT OF



       PATHOLOGY AND GENOMIC



       MEDICINE

 

 Bilirubin direct  <0.2  0.0 - 0.3 mg/dL  [facility] DEPARTMENT OF



       PATHOLOGY AND GENOMIC



       MEDICINE

 

 Alkaline phosphatase  91  35 - 104 U/L  [facility] DEPARTMENT OF



       PATHOLOGY AND GENOMIC



       MEDICINE

 

 Protein  7.8  6.3 - 8.3 g/dL  [facility] DEPARTMENT OF



   Comment:    PATHOLOGY AND GENOMIC



    4.6-7.0 g/dL    MEDICINE



   1 week 4.4-7.6 g/dL    



   7 months-1year5.1-7.3 g/dL    



   1-2 years5.6-7.5 g/dL    



   >3 years6.0-8.0 g/dL    



    6.3-8.3 g/dL    



       

 

 ALT  50  5 - 50 U/L  [facility] DEPARTMENT OF



       PATHOLOGY AND GENOMIC



       MEDICINE

 

 AST  31  10 - 35 U/L  [facility] DEPARTMENT OF



       PATHOLOGY AND GENOMIC



       MEDICINE









 Specimen

 

 Plasma specimen









 Performing Organization  Address  City/State/Zipcode  Phone Number

 

 [facility] DEPARTMENT OF PATHOLOGY AND  [address] [place] [address]  



 [facility]      



Bone Density Peripheral (2018  5:25 PM CDT)





 Narrative  Performed At

 

 EXAMINATION:BONE DENSITY PERIPHERAL



   [facility]



  



  



 CLINICAL HISTORY:M80.00XA Age-related osteoporosis with current  



 pathological fracture 



  



  



  



 COMPARISON:None.



  



  



  



 The results of this study expressed as bone mineral density (BMD) were  



 as follows:



  



  



  



  



  



 Left Forearm (Radius 33%):



  



 BMD: 0.847 g/cm2



  



 T-Score: -0.3



  



 Z-Score: 0.3 



  



 Percent change: No prior exam. %



  



  



  



  



  



 Impression:Normal BMD.



  



  



  



  



  



 [facility]-[id number]



  



  



  



 A copy of this scans including a report detailing these results will  



 follow.



  



  



  



 Notes: 



  



 *The world health organization (WHO) has classified the patient's  



 T-score as follows:



  



  



  



 Normal=T-score at or above -1.0 SD



  



 Osteopenia=T-score between -1.0 and -2.5 SD



  



 Osteoporosis=T-score at or below minus 2.5 SD 



  



  



  



 **For premenopausal women, men under the age 50 years, and children the  



 WHO classification does not apply. In these individuals please assess  



 bone mineral density with Z scores for each skeletal site examined. 



  



  



  



 Z scores above -2.0: Within expected range for age. 



  



 Z scores lower than -2.0:Low bone density for age.



  



   









 Procedure Note

 

  Interface, Radiology Results Incoming - 2018  5:55 PM CDT



EXAMINATION:  BONE DENSITY PERIPHERAL



 



 CLINICAL HISTORY:  M80.00XA Age-related osteoporosis with current pathological 
fracture



 



 COMPARISON:  None.



 



 The results of this study expressed as bone mineral density (BMD) were as 
follows:



 



 



 Left Forearm (Radius 33%):



 BMD: 0.847 g/cm2



 T-Score: -0.3



 Z-Score: 0.3



 Percent change: No prior exam. %



 



 



 Impression:  Normal BMD.



 



 



 [facility]-[id number]



 



 A copy of this scans including a report detailing these results will follow.



 



 Notes:



 *The world health organization (WHO) has classified the patient's T-score as 
follows:



 



 Normal=T-score at or above -1.0 SD



 Osteopenia=T-score between -1.0 and -2.5 SD



 Osteoporosis=T-score at or below minus 2.5 SD



 



 **For premenopausal women, men under the age 50 years, and children the WHO 
classification does not apply. In these individuals please assess bone mineral 
density with Z scores for each skeletal site examined.



 



 Z scores above -2.0: Within expected range for age.



 Z scores lower than -2.0:  Low bone density for age.









 Performing Organization  Address  City/State/Zipcode  Phone Number

 

 [facility]  [address] [place] [address]  



Bone Density (2018  5:25 PM CDT)





 Narrative  Performed At

 

 EXAMINATION:BONE DENSITY



   [facility]



  



  



 CLINICAL HISTORY:M80.00XA Age-related osteoporosis with current  



 pathological fracture 



  



  



  



 COMPARISON:None.



  



  



  



 The results of this study expressed as bone mineral density (BMD) were  



 as follows:



  



  



  



 AP spine (L1-L4)



  



 BMD: 1.462 g/cm2



  



 T-Score: 2.4



  



 Z-Score: 2.1



  



 Percent change: No prior exam.%



  



 ***Values are likely spuriously elevated secondary to multilevel  



 degenerative disc disease and reactive vertebral body osteosclerosis  



 along the concave margin of a levoscoliotic spine.



  



  



  



  



  



 Dual Femur (Total Mean):



  



 ***Limited by bilateral total arthroplasties.



  



  



  



  



  



 Impression:Limited examination secondary to degenerative discogenic  



 osteosclerosis of the AP lumbar spine and bilateral total hip  



 arthroplasties.



  



  



  



  



  



 Notes: 



  



 *The world health organization (WHO) has classified the patient's  



 T-score as follows:



  



  



  



 Normal=T-score at or above -1.0 SD



  



 Osteopenia=T-score between -1.0 and -2.5 SD



  



 Osteoporosis=T-score at or below minus 2.5 SD 



  



  



  



 **For premenopausal women, men under the age 50 years, and children the  



 WHO classification does not apply. In these individuals please assess  



 bone mineral density with Z scores for each skeletal site examined. 



  



  



  



 Z scores above -2.0: Within expected range for age. 



  



 Z scores lower than -2.0:Low bone density for age.



  



  



  



 ***The TBS is derived from the texture of the DEXA image and has been  



 shown to be related to bone microarchitecture and fracture risk. This  



 data provides information independent of BMD value; is used as a  



 complement to the data obtained from the DEXA 



  



 analysis and the clinical examination. The TBS can assist the healthcare  



 professional in assessment of fracture risk and in monitoring the effect  



 of treatments on patient over time.



  



  



  



 [facility]-[id number]



  



   









 Procedure Note

 

 [facility], Radiology Results Incoming - 2018  5:52 PM CDT



EXAMINATION:  BONE DENSITY



 



 CLINICAL HISTORY:  M80.00XA Age-related osteoporosis with current pathological 
fracture



 



 COMPARISON:  None.



 



 The results of this study expressed as bone mineral density (BMD) were as 
follows:



 



 AP spine (L1-L4)



 BMD: 1.462 g/cm2



 T-Score: 2.4



 Z-Score: 2.1



 Percent change: No prior exam.%



 ***Values are likely spuriously elevated secondary to multilevel degenerative 
disc disease and reactive vertebral body osteosclerosis along the concave 
margin of a levoscoliotic spine.



 



 



 Dual Femur (Total Mean):



 ***Limited by bilateral total arthroplasties.



 



 



 Impression:  Limited examination secondary to degenerative discogenic 
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



 



 



 Notes:



 *The world health organization (WHO) has classified the patient's T-score as 
follows:



 



 Normal=T-score at or above -1.0 SD



 Osteopenia=T-score between -1.0 and -2.5 SD



 Osteoporosis=T-score at or below minus 2.5 SD



 



 **For premenopausal women, men under the age 50 years, and children the WHO 
classification does not apply. In these individuals please assess bone mineral 
density with Z scores for each skeletal site examined.



 



 Z scores above -2.0: Within expected range for age.



 Z scores lower than -2.0:  Low bone density for age.



 



 ***The TBS is derived from the texture of the DEXA image and has been shown to 
be related to bone microarchitecture and fracture risk. This data provides 
information independent of BMD value; is used as a complement to



 the data obtained from the DEXA



 analysis and the clinical examination. The TBS can assist the healthcare 
professional in assessment of fracture risk and in monitoring the effect of 
treatments on patient over time.



 



 [facility]-[id number]









 Performing Organization  Address  City/State/Zipcode  Phone Number

 

 [facility]  [address] [place] [address]  



after 2018



Insurance







 Payer  Benefit Plan / Group  Subscriber ID  Type  Phone  Address

 

 [facility]  xxxxxxxxxxx  O    









 Guarantor Name  Account Type  Relation to  Date of Birth  Phone  Billing



     Patient      Address

 

 Gildardo Ryan  Personal/Family  Self  1961  [phone]  [phone]







         (Home)  [place] [address]







Advance Directives

Patient has advance care planning documents on file. For more information, 
please contact:[facility] [id number] [place] [address]

## 2019-04-24 NOTE — ER
Nurse's Notes                                                                                     

 [facility]                                                                 

Name: Gita Shepherd                                                                                 

Age: 58 yrs                                                                                       

Sex: Female                                                                                       

: 1961                                                                                   

MRN: [id number]                                                                                   

Arrival Date: 2019                                                                          

Time: 21:55                                                                                       

Account#: [id number]                                                                            

Bed 17                                                                                            

Private MD: Arnulfo London                                                                          

Diagnosis: Rash and other nonspecific skin eruption                                               

                                                                                                  

Presentation:                                                                                     

                                                                                             

22:13 Presenting complaint: Patient states: I have leukemia and I have developed a rash and   ed1 

      swelling so my doctor told me to come to the ER. Transition of care: patient was not        

      received from another setting of care. Onset of symptoms was 2019. Risk           

      Assessment: Do you want to hurt yourself or someone else? Patient reports no desire to      

      harm self or others. Initial Sepsis Screen: Does the patient meet any 2 criteria? No.       

      Patient's initial sepsis screen is negative. Does the patient have a suspected source       

      of infection? No. Patient's initial sepsis screen is negative. Care prior to arrival:       

      None.                                                                                       

22:13 Method Of Arrival: Wheelchair                                                           ed1 

22:13 Acuity: HANNAH 3                                                                           ed1 

                                                                                                  

Triage Assessment:                                                                                

22:19 General: Appears uncomfortable, Behavior is calm, cooperative. Pain: Complains of pain  ed1 

      in right hand, left hand, right arm, left arm, right leg and left leg Pain currently is     

      8 out of 10 on a pain scale.                                                                

                                                                                                  

Historical:                                                                                       

- Allergies:                                                                                      

22:19 PENICILLINS;                                                                            ed1 

- Home Meds:                                                                                      

22:19 metformin 1,000 mg Oral tab 1 tab 2 times per day [Active]; metoprolol succinate 100 mg ed1 

      oral Tb24 1 tab once daily [Active]; topiramate 50 mg oral tab 1 tab 2 times per day        

      [Active]; Morphine Pain Pump [Active]; Trintelix 20 mg daily [Active]; Linzess 290 mcg      

      oral cap 1 cap once daily [Active]; levofloxacin 500 mg Oral tab 1 tab once daily           

      [Active]; valacyclovir 500 mg Oral tab [Active]; Noxafil 100 mg oral TbEC 3 tabs once       

      daily [Active];                                                                             

- PMHx:                                                                                           

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 

- PSHx:                                                                                           

22:19 Tonsillectomy; Appendectomy; Hysterectomy; Cholecystectomy; Hernia repair; Carpal       ed1 

      Tunnel Repair; Hip Repair; Hip Replacement;                                                 

                                                                                                  

- Immunization history:: Adult Immunizations up to date.                                          

- Social history:: Smoking status: Patient/guardian denies using tobacco.                         

- Ebola Screening: : Patient negative for fever greater than or equal to 101.5 degrees            

  Fahrenheit, and additional compatible Ebola Virus Disease symptoms Patient denies               

  exposure to infectious person Patient denies travel to an Ebola-affected area in the            

  21 days before illness onset No symptoms or risks identified at this time.                      

- Family history:: not pertinent.                                                                 

- Hospitalizations: : No recent hospitalization is reported.                                      

                                                                                                  

                                                                                                  

Screenin:16 Abuse screen: Denies threats or abuse. Denies injuries from another. Nutritional        cc3 

      screening: No deficits noted. Tuberculosis screening: No symptoms or risk factors           

      identified. Fall Risk Ambulatory Aid- None/Bed Rest/Nurse Assist (0 pts). Gait-             

      Normal/Bed Rest/Wheelchair (0 pts) Mental Status- Oriented to own ability (0 pts).          

                                                                                                  

Assessment:                                                                                       

22:16 General: Appears in no apparent distress. uncomfortable, Behavior is calm, cooperative, cc3 

      appropriate for age. Pain: Complains of pain in generalized body pains. Neuro: Level of     

      Consciousness is awake, alert, obeys commands, Oriented to person, place, time,             

      situation, Appropriate for age. Cardiovascular: Denies chest pain, Patient's skin is        

      warm and dry. Respiratory: Airway is patent Respiratory effort is even, unlabored,          

      Respiratory pattern is regular, symmetrical. GI: Abdomen is round non-distended. : No     

      signs and/or symptoms were reported regarding the genitourinary system. EENT: No signs      

      and/or symptoms were reported regarding the EENT system. Derm: Rash noted that is red,      

      on bilateral upper and lower limbs. Musculoskeletal: Circulation, motion, and sensation     

      intact. Range of motion: limited in right arm.                                              

23:10 Reassessment: Patient appears in no apparent distress at this time. Patient and/or      cc3 

      family updated on plan of care and expected duration. Pain level reassessed. Patient is     

      alert, oriented x 3, equal unlabored respirations, skin warm/dry/pink. missed IV            

      cannulations, informed charge nurse Jana and said she'll come to do a midline for        

      the patient. Patient informed. Patient doesn't want to put the blood pressure cuff on       

      any of her arms or legs because she said they're hurting.                                   

23:25 Reassessment: Patient appears in no apparent distress at this time. Patient and/or      cc3 

      family updated on plan of care and expected duration. Pain level reassessed. Patient is     

      alert, oriented x 3, equal unlabored respirations, skin warm/dry/pink. Patient wants to     

      leave AMA to go to [facility], Dr. Amador informed and he talked to the patient and her     

       but still patient opted to go AMA though risks and consequences explained and       

      signed the AMA form herself. Charge nurse Jana informed.                                 

23:30 Reassessment: Patient appears in no apparent distress at this time. Patient and/or      cc3 

      family updated on plan of care and expected duration. Pain level reassessed. Patient is     

      alert, oriented x 3, equal unlabored respirations, skin warm/dry/pink. Patient left ER      

      vitally stable by wheelchair escorted by her .                                       

                                                                                                  

Vital Signs:                                                                                      

22:19  / 77; Pulse 108; Resp 20; Temp 99.7(O); Pulse Ox 96% on R/A; Weight 96.62 kg;    ed1 

      Height 5 ft. 7 in. (170.18 cm); Pain 8/10;                                                  

22:19 Body Mass Index 33.36 (96.62 kg, 170.18 cm)                                             ed1 

                                                                                                  

ED Course:                                                                                        

21:55 Patient arrived in ED.                                                                  am2 

21:55 Arnulfo London MD is Private Physician.                                                  am2 

22:13 Shima Silveira, HAILEE is Primary Nurse.                                                      ed1 

22:14 Triage completed.                                                                       ed1 

22:16 Ely Mayberry is Primary Nurse.                                                      cc3 

22:16 Patient has correct armband on for positive identification. Bed in low position. Call   cc3 

      light in reach. Side rails up X 1. Cardiac monitor on. Pulse ox on. NIBP on.                

22:19 Arm band placed on Patient placed in an exam room, on a stretcher.                      ed1 

22:25 Jarod Amador MD is Attending Physician.                                                rn  

22:45 First set of blood cultures drawn.                                                      mt  

22:49 Missed attempt(s): 22 gauge in left antecubital area.                                   mt  

23:06 Missed attempt(s): 22 gauge in left hand.                                               cc3 

23:10 Missed attempt(s): 22 gauge in right hand. by HAILEE Lin.                                cc3 

23:25 No provider procedures requiring assistance completed. Patient did not have IV access   cc3 

      during this emergency room visit.                                                           

                                                                                                  

Administered Medications:                                                                         

22:50 Drug: Motrin 800 mg Route: PO;                                                          cc3 

23:15 Follow up: Response: No adverse reaction                                                cc3 

23:41 Not Given (patient left AMA): NS 0.9% 1000 ml IV at 1000 ml once                        cc3 

                                                                                                  

                                                                                                  

Outcome:                                                                                          

23:25 AMA AMA form signed                                                                     cc3 

23:25 Condition: stable                                                                           

23:25 Instructed on follow up and referral plans. Demonstrated understanding of instructions.     

23:54 Patient left the ED.                                                                    cc3 

                                                                                                  

Signatures:                                                                                       

Jarod Amador MD MD rn Riggs, Erika, RN                        RN   ed1                                                  

Amalia Brush                               am2                                                  

Mary Jane Post mt, Charlene                             cc3                                                  

                                                                                                  

Corrections: (The following items were deleted from the chart)                                    

                                                                                             

00:30  23:10 Reassessment: Patient appears in no apparent distress at this time. Patient cc3 

      and/or family updated on plan of care and expected duration. Pain level reassessed.         

      Patient is alert, oriented x 3, equal unlabored respirations, skin warm/dry/pink.           

      missed IV cannulations, informed charge nurse Jana and said she'll come to do a          

      midline for the patient. Patient informed. cc3                                              

                                                                                                  

**************************************************************************************************

## 2019-04-24 NOTE — EDPHYS
Physician Documentation                                                                           

 [facility]                                                                 

Name: Gita Shepherd                                                                                 

Age: 58 yrs                                                                                       

Sex: Female                                                                                       

: 1961                                                                                   

MRN: [id number]                                                                                   

Arrival Date: 2019                                                                          

Time: 21:55                                                                                       

Account#: [id number]                                                                            

Bed 17                                                                                            

Private MD: Arnulfo London                                                                          

ED Physician Jarod Amador                                                                         

HPI:                                                                                              

                                                                                             

23:26 This 58 yrs old  Female presents to ER via Wheelchair with complaints of Rash, rn  

      Leg Swelling, Knee Pain, Wrist Pain.                                                        

23:43 The patient's rash thought to be caused by an unknown cause. The rash is located on the rn  

      body diffusely. The rash can be described as erythematous. Onset: The symptoms/episode      

      began/occurred yesterday. Severity of symptoms: At their worst the symptoms were mild       

      in the emergency department the symptoms are unchanged. The patient has not experienced     

      similar symptoms in the past. 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     

      painful, to arms/legs, is spreading, has muscle and joint aching. .                         

                                                                                                  

Historical:                                                                                       

- Allergies:                                                                                      

22:19 PENICILLINS;                                                                            ed1 

- Home Meds:                                                                                      

22:19 metformin 1,000 mg Oral tab 1 tab 2 times per day [Active]; metoprolol succinate 100 mg ed1 

      oral Tb24 1 tab once daily [Active]; topiramate 50 mg oral tab 1 tab 2 times per day        

      [Active]; Morphine Pain Pump [Active]; Trintelix 20 mg daily [Active]; Linzess 290 mcg      

      oral cap 1 cap once daily [Active]; levofloxacin 500 mg Oral tab 1 tab once daily           

      [Active]; valacyclovir 500 mg Oral tab [Active]; Noxafil 100 mg oral TbEC 3 tabs once       

      daily [Active];                                                                             

- PMHx:                                                                                           

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 

- PSHx:                                                                                           

22:19 Tonsillectomy; Appendectomy; Hysterectomy; Cholecystectomy; Hernia repair; Carpal       ed1 

      Tunnel Repair; Hip Repair; Hip Replacement;                                                 

                                                                                                  

- Immunization history:: Adult Immunizations up to date.                                          

- Social history:: Smoking status: Patient/guardian denies using tobacco.                         

- Ebola Screening: : Patient negative for fever greater than or equal to 101.5 degrees            

  Fahrenheit, and additional compatible Ebola Virus Disease symptoms Patient denies               

  exposure to infectious person Patient denies travel to an Ebola-affected area in the            

  21 days before illness onset No symptoms or risks identified at this time.                      

- Family history:: not pertinent.                                                                 

- Hospitalizations: : No recent hospitalization is reported.                                      

                                                                                                  

                                                                                                  

ROS:                                                                                              

23:43 Constitutional: + fever Eyes: Negative for injury, pain, redness, and discharge, Neck:  rn  

      Negative for injury, pain, and swelling, Cardiovascular: Negative for chest pain,           

      palpitations, and edema, Respiratory: Negative for shortness of breath, cough,              

      wheezing, and pleuritic chest pain, Abdomen/GI: Negative for abdominal pain, nausea,        

      vomiting, diarrhea, and constipation, MS/Extremity: + knee/wrist/elbow aching, + rash       

      to extremities.  Skin: Negative for injury, rash, and discoloration, Neuro: Negative        

      for headache, weakness, numbness, tingling, and seizure.                                    

                                                                                                  

Exam:                                                                                             

23:43 Constitutional:  This is a well developed, well nourished patient who is awake, alert,  rn  

      and in no acute distress. Head/Face:  Normocephalic, atraumatic. Eyes:  Pupils equal        

      round and reactive to light, extra-ocular motions intact.  Lids and lashes normal.          

      Conjunctiva and sclera are non-icteric and not injected.  Cornea within normal limits.      

      Periorbital areas with no swelling, redness, or edema. ENT:  No oral rash Respiratory:      

      No increased work of breathing, no retractions or nasal flaring. Skin:  + erythematous      

      nodular rash to all 4 extremities, tender, no vesicles, no bullae, no skin sloughing.       

      MS/ Extremity:  Pulses equal, no cyanosis.  Neurovascular intact.   Neuro:  Awake and       

      alert, GCS 15, oriented to person, place, time, and situation.  Cranial nerves II-XII       

      grossly intact.  Motor strength 5/5 in all extremities.  Sensory grossly intact.            

                                                                                                  

Vital Signs:                                                                                      

22:19  / 77; Pulse 108; Resp 20; Temp 99.7(O); Pulse Ox 96% on R/A; Weight 96.62 kg;    ed1 

      Height 5 ft. 7 in. (170.18 cm); Pain 8/10;                                                  

22:19 Body Mass Index 33.36 (96.62 kg, 170.18 cm)                                             ed1 

                                                                                                  

MDM:                                                                                              

22:25 Patient medically screened.                                                             rn  

23:43 Differential diagnosis: cellulitis, sweet syndrome, chemo rash. Data reviewed: vital    rn  

      signs, nurses notes. Refusal of service: The patient/guardian displays adequate             

      decision making capability and despite a detailed discussion of alternatives, benefits,     

      risks, and consequences refuses: patient upset after nursing unable to successfully get     

      blood/IV, wants to leave to go to [facility], signed out AMA..                             

                                                                                                  

                                                                                             

22:31 Order name: CBC with Diff                                                               rn  

                                                                                             

22:31 Order name: Blood Culture Adult (2)                                                     rn  

                                                                                                  

Administered Medications:                                                                         

22:50 Drug: Motrin 800 mg Route: PO;                                                          cc3 

23:15 Follow up: Response: No adverse reaction                                                cc3 

23:41 Not Given (patient left AMA): NS 0.9% 1000 ml IV at 1000 ml once                        cc3 

                                                                                                  

                                                                                                  

Disposition:                                                                                      

19 23:46 Patient has left against medical advice. Impression: Rash and other nonspecific    

  skin eruption. - Patients states they are going to Home.                                        

- Condition is Stable.                                                                            

- Discharge Instructions: Rash.                                                                   

                                                                                                  

                                                                                                  

Follow up: Private Physician; When: As needed; Reason: Recheck today's complaints,                

  Re-evaluation by your physician.                                                                

- Problem is new.                                                                                 

- Symptoms have improved.                                                                         

                                                                                                  

                                                                                                  

                                                                                                  

Signatures:                                                                                       

Dispatcher [employer]                           EDMS                                                 

Jarod Amador MD MD rn Riggs, Erika, RN RN   ed1                                                  

Ely Mayberry                             cc3                                                  

                                                                                                  

Corrections: (The following items were deleted from the chart)                                    

23:29 22:31 CBC with Automated Diff ordered. EDMS                                             EDMS

23:29 22:31 BASIC METABOLIC PANEL+C.LAB.BRZ ordered. EDMS                                     EDMS

23:29 22:31 Procalcitonin+C.LAB.BRZ ordered. EDMS                                             EDMS

23:41 22:31 IV Saline Lock ordered. rn                                                        cc3 

23:54 23:46 2019 23:46 Patients has left against medical advice. Impression: Rash and   cc3 

      other nonspecific skin eruption. Patient states they are going to Home. Condition is        

      Stable. Follow up: Private Physician; When: As needed; Reason: Recheck today's              

      complaints, Re-evaluation by your physician. Problem is new. Symptoms have improved. rn     

                                                                                                  

**************************************************************************************************

## 2019-04-24 NOTE — XMS REPORT
Clinical Summary

 Created on:2019



Patient:Gildardo Ryan

Sex:Female

:1961

External Reference #:[id number]





Demographics







 Address  [address] [place] [address]

 

 Home Phone  [phone]

 

 Mobile Phone  [phone]

 

 Home Phone  [phone]

 

 Home Phone  [phone]

 

 Email Address  [email or web address]

 

 Preferred Language  English

 

 Marital Status  

 

 Pentecostal Affiliation  Unknown

 

 Race  White

 

 Ethnic Group  Not  or 









Author







 Organization  [facility]

 

 Address  [address] [place] [address]









Support







 Name  Relationship  Address  Phone

 

 Brett Ryan  Spouse  Unavailable  [phone]









Care Team Providers







 Name  Role  Phone

 

 Arnulfo London MD  Primary Care Provider  [phone]









Allergies







 Active Allergy  Reactions  Severity  Noted Date  Comments

 

 Penicillins  Rash  Low  2018  







Medications







 Medication  Sig  Dispensed  Refills  Start Date  End Date  Status

 

 metFORMIN  Take 1 mg by    0      Active



 (GLUCOPHAGE) 1,000  mouth 2 (two)          



 mg tablet  times a day.          

 

 simvastatin (ZOCOR)  Take 10 mg by    0      Active



 20 MG tablet  mouth nightly.          

 

 lurasidone (LATUDA)  Take 60 mg by    0      Active



 60 mg tablet  mouth daily.          

 

 desvenlafaxine  Take 100 mg by    0      Active



 (PRISTIQ) 100 MG 24  mouth daily.          



 hr tablet            

 

 topiramate (TOPAMAX)  Take 50 mg by    0      Active



 50 MG tablet  mouth 2 (two)          



   times a day.          

 

 metoprolol succinate  Take 100 mg by    0      Active



 XL (TOPROL-XL) 100  mouth daily.          



 mg 24 hr tablet            

 

 esomeprazole  Take 40 mg by    0      Active



 (NexIUM) 40 MG  mouth daily          



 capsule  before          



   breakfast.          

 

 ranitidine (ZANTAC)  Take 150 mg by    0      Active



 150 MG tablet  mouth nightly.          

 

 umeclidinium  Inhale 1 puff    0      Active



 brm/vilanterol tr  daily.          



 (ANORO ELLIPTA INHL)            

 

 polyethylene glycol  Take 17 g by    0      Active



 (MIRALAX) 17 gram  mouth daily.          



 packet            

 

 lisinopril  Take 1 tablet  30 tablet  0  2018    Active



 (PRINIVIL,ZESTRIL)  (20 mg total)          



 20 mg tablet  by mouth          



   nightly for 30          



   days.          

 

 amitriptyline  Take by mouth    0      Discontinued



 (ELAVIL) 75 MG  nightly.        8  



 tablet            

 

 levomefolate-algal  Take by mouth    0      Discontinued



 oil (DEPLIN, ALGAL  daily.        8  



 OIL,) 15-90.314 mg            



 capsule            

 

 furosemide (LASIX)  Take 20 mg by    0      Discontinued



 20 mg tablet  mouth daily.        8  

 

 lisinopril  Take 10 mg by    0      Discontinued



 (PRINIVIL,ZESTRIL)  mouth nightly.        8  



 10 mg tablet            

 

 sodium chloride 0.9%  by epidural    0      Discontinued



 parenteral solution  route        8  



 with morPHINE PF 1  continuously.          



 mg/mL solution,            



 bupivacaine (PF) 0.5            



 % (5 mg/mL) solution            



 0.0625 %, clonIDINE            



 (PF) 1,000 mcg/10 mL            



 (100 mcg/mL)            



 solution 1 mcg/mL            

 

 calcium carbonate  Chew 1 tablet  42 tablet  0  2018  



 (TUMS) 200 mg  (500 mg total)        8  



 calcium (500 mg)  3 (three)          



 chewable tablet  times a day          



   for 14 days.          

 

 docusate sodium  Take 1 capsule  60 capsule  0  2018  



 (COLACE) 100 MG  (100 mg total)        8  



 capsule  by mouth 2          



   (two) times a          



   day for 30          



   days.          

 

 gabapentin  Take 1 capsule  90 capsule  0  2018  



 (NEURONTIN) 100 mg  (100 mg total)        8  



 capsule  by mouth 3          



   (three) times          



   a day for 30          



   days.          

 

 pantoprazole  Take 1 tablet  60 tablet  0  2018  Discontinued



 (PROTONIX) 40 MG EC  (40 mg total)        8  



 tablet  by mouth 2          



   (two) times a          



   day for 30          



   days.          

 

 polyethylene glycol  Take 17 g by  30 packet  0  2018  
Discontinued



 (MIRALAX) 17 gram  mouth daily        8  



 packet  for 30 days.          

 

 senna (SENOKOT) 8.6  Take 2 tablets  60 tablet  0  2018  




 mg tablet  by mouth daily        8  



   with lunch for          



   30 days.          

 

 tiZANidine  Take 1 tablet  30 tablet  2  2018  



 (ZANAFLEX) 2 MG  (2 mg total)        8  



 tablet  by mouth every          



   6 (six) hours          



   as needed for          



   muscle spasms          



   for up to 30          



   days.          

 

 amLODIPine (NORVASC)  Take 1 tablet  30 tablet  0  2018  




 5 mg tablet  (5 mg total)        8  



   by mouth daily          



   for 30 days.          







Active Problems







 Problem  Noted Date

 

 Acute post-hemorrhagic anemia  2018

 

 S/P lumbar spinal fusion  2018

 

 Electrolyte and fluid disorder  2018

 

 Acute respiratory distress  2018

 

 Post-operative pain  2018

 

 Post-operative nausea and vomiting  2018

 

 Chronic pain  2018

 

 Obesity, Class III, BMI 40-49.9 (morbid obesity)  2018

 

 Acute blood loss anemia  2018

 

 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

 

 Scoliosis due to degenerative disease of spine in adult patient  2018







Encounters







 Date  Type  Specialty  Care Team  Description

 

 2019  Office Visit  Neurosurgery  Chris,  S/P lumbar spinal



       Perry BECERRIL MD  fusion (Primary Dx)

 

 2019  Hospital Encounter  Radiology  Ludy Perez MD  thoracolumbar spine,



         unspecified scoliosis



         type

 

 2019  Orders Only  Neurosurgery  Brush,  Lumbar radiculopathy, acute (
Primary Dx);



       Carmen, MA  Thoracic spine pain

 

 2019  Orders Only  Neurosurgery  Leufroy-Aela  Scoliosis of



       za, ,  thoracolumbar spine,



       MA  unspecified scoliosis



         type (Primary Dx)

 

 2018  Orders Only  Neurosurgery  Leufroy-Alea  Scoliosis of



       za, ,  thoracolumbar spine,



       MA  unspecified scoliosis



         type (Primary Dx)

 

 2018  Office Visit  Neurosurgery  Chris,  Scoliosis due to



       Perry BECERRIL MD  degenerative disease



         of spine in adult



         patient (Primary Dx)

 

 2018  Hospital Encounter  Radiology  Ludy Perez MD  thoracolumbar spine,



         unspecified scoliosis



         type

 

 2018  Transcribe Orders  Neurosurgery  Leufroy-Alea  Scoliosis of



       za, ,  thoracolumbar spine,



       MA  unspecified scoliosis



         type (Primary Dx)

 

 2018 -  Hospital Encounter  Rehabilitation  Tastard,  Scoliosis due to



 2018      Devika LOVE MD  degenerative disease



         of spine in adult



         patient (Primary Dx)

 

 2018  Anesthesia Event  General Surgery  Kendal Ryan MD  

 

 2018  Surgery  General Surgery  Chris,  L2-L3, L4-L5 LATERAL



       Perry BECERRIL MD  INTERBODY FUSION W/



         INSTRUMENTATION

 

 07/10/2018  Anesthesia Event  General Surgery  Berry Barclay,  



       APRN  

 

 07/10/2018  Surgery  General Surgery  Chris,  L5-S1 ANTERIOR LUMBAR



       Perry BECERRIL MD  INTERBODY FUSION W/



         INSTRUMENTATION.

 

 07/10/2018 -  Hospital Encounter  Neurosurgery  Chris,  Other secondary 
scoliosis;



 2018      Perry BECERRIL MD  Scoliosis of lumbar spine, unspecified scoliosis 
type

 

 2018  Hospital Encounter  Radiology  Chris,  Preop testing



       Perry BECERRIL MD  

 

 2018  Pre-Admit Testing  Pre-Admission Testing  Chris  Preop testing (
Primary



   Appointment    Perry BECERRIL MD  Dx)

 

 2018  Pre-Admit Testing  Pre-Admission Testing  Chris  Preop testing (
Primary



   Appointment    Perry BECERRIL MD  Dx)

 

 2018  Hospital Encounter  Radiology  Chris  Age-related



       Perry BECERRIL MD  osteoporosis with



         current pathological



         fracture, initial



         encounter

 

 2018  Hospital Encounter  Radiology  Chris  Age-related



       Perry BECERRIL MD  osteoporosis with



         current pathological



         fracture, initial



         encounter

 

 2018  Office Visit  Neurosurgery  Chris,  Scoliosis due to



       Perry BECERRIL MD  degenerative disease



         of spine in adult



         patient (Primary Dx)

 

 2018  Transcribe Orders  Neurosurgery  ChitoAlea  Age-related



       za, ,  osteoporosis with



       MA  current pathological



         fracture, initial



         encounter (Primary Dx)



after 2018



Family History







 Medical History  Relation  Name  Comments

 

 Cancer  Paternal Grandfather    HIGH BLOOD PRESSURE/DIABETES/STROKE/THYROID



       TOOLITTLE









 Relation  Name  Status  Comments

 

 Paternal Grandfather    Other  







Social History







 Tobacco Use  Types  Packs/Day  Years Used  Date

 

 Former Smoker    2  25  Quit: 









 Smokeless Tobacco: Never Used      









 Comments: QUITE 5 YEARS









 Alcohol Use  Drinks/Week  oz/Week  Comments

 

 No      









 Sex Assigned at Birth  Date Recorded

 

 Not on file  









 Job Start Date  Occupation  Industry

 

 Not on file  Not on file  Not on file









 Travel History  Travel Start  Travel End









 No recent travel history available.







Last Filed Vital Signs







 Vital Sign  Reading  Time Taken

 

 Blood Pressure  118/65  2018 10:49 AM CDT

 

 Pulse  82  2018 10:49 AM CDT

 

 Temperature  37.1 C (98.7 F)  2018 10:49 AM CDT

 

 Respiratory Rate  18  2018 10:49 AM CDT

 

 Oxygen Saturation  95%  2018 10:49 AM CDT

 

 Inhaled Oxygen Concentration  -  -

 

 Weight  117 kg (259 lb)  07/10/2018  6:56 AM CDT

 

 Height  170.2 cm (5' 7")  07/10/2018  6:56 AM CDT

 

 Body Mass Index  40.57  07/10/2018  6:56 AM CDT







Plan of Treatment







 Date  Type  Specialty  Care Team  Description

 

 2019  Appointment  Radiology  Perry Perez MD



  



       [address]



  



       [address]



  



       [place], [place] [address] [phone] (Fax)  

 

 2019  Appointment  Radiology  Perry Perez MD



  



       [address]



  



       [address]



  



       [place], [place] [address] [phone] (Fax)  

 

 2019  Office Visit  Neurosurgery  Perry Perez MD



  



       [address]



  



       [address]



  



       [place], [place] [address] [phone] (Fax)  









 Health Maintenance  Due Date  Last Done  Comments

 

 CERVICAL CANCER SCREENING  1982    

 

 BREAST CANCER SCREENING  2011    

 

 COLON CANCER SCREENING  2011    

 

 SHINGLES VACCINES (#1)  2011    

 

 INFLUENZA VACCINE  2019  







Implants







 Implanted  Type  Area    Device  Shelf  Model /



         Identifier  Expiration  Serial / Lot



           Date  

 

 Bone Matriz Osteocel Pro Large - [id number] - [id number]  Human Tissue  N/A:  
NUVASIVE    2023  [id number] /



 Implanted: Qty: 1 on 07/10/2018 by Perry Perez MD  Implants  N/A        
[id number] /



             [id number]

 

 Paste Dbm Easy-Dispensing Wdmth Syr 10ml [address] - [id number] - [id number]
  Human Tissue  Posteri  MEDTRONIC    2020  [id number] /



 Implanted: Qty: 1 on 2018 by Perry Perez MD  Implants  or: N/A  
SPINAL GRAFT      [id number] /



       TECHNOLOGIES      [id number]

 

 Paste Dbm Easy-Dispensing Wdmth Syr 10ml [address] - [id number] - [id number]
  Human Tissue  Posteri  MEDTRONIC    2020  [id number] /



 Implanted: Qty: 1 on 2018 by Perry Perez MD  Implants  or: N/A  
SPINAL GRAFT      [id number] /



       TECHNOLOGIES      [id number]

 

 Bone Cancellous 30ml Chips - [id number] - [id number]  Human Tissue  Posteri  
RTI SURGICAL    2022  [id number] /



 Implanted: Qty: 1 on 2018 by Perry Perez MD  Implants  or: N/A  
INC.      [id number] /



             [id number]

 

 Bone Cancellous 30ml Chips - [id number] - [id number]  Human Tissue  Posteri  
RTI SURGICAL    2022  [id number] /



 Implanted: Qty: 1 on 2018 by Perry Perez MD  Implants  or: N/A  
INC.      [id number] /



             [id number]

 

 Bone Matriz Osteocel Pro Large - [id number] - [id number]  Human Tissue  N/A:  
NUVASIVE    2023  [id number] /



 Implanted: Qty: 1 on 2018 by Perry Perez MD  Implants  N/A        
[id number] /



             [id number]

 

 Kit Bone Grft Lmbr Tprd 8ml Xxl Infuse - [id number]  Human Tissue  Posteri  
MEDTRONIC    2019  [id number] /



 Implanted: Qty: 1 on 2018 by Perry Perez MD  Implants  or: N/A  
SPINAL AND       /



       BIOLOGICS      [id number]

 

 Kit Bone Grft Lmbr Tprd 8ml Xxl Infuse - [id number]  Human Tissue  Posteri  
MEDTRONIC    2019  [id number] /



 Implanted: Qty: 1 on 2018 by Perry Perez MD  Implants  or: N/A  
SPINAL AND       /



       BIOLOGICS      [id number]

 

 Maxcess 4 Surgical Access Kit - [id number]  IPM IMPLANT  N/A:  NUVASIVE SPINE 
     [id number] /



 Implanted: Qty: 1 on 07/10/2018 by Perry Perez MD  DEVICES  N/A         /

 

 Base Ti Imp, 4x81m51  10               - [id number]  IPM IMPLANT  N/A:  
NUVASIVE SPINE      [id number] /



 Implanted: Qty: 1 on 07/10/2018 by Perry Perez MD  DEVICES  N/A         /

 

 Base Ti [place] 6.0 X 17.5mm Variable - [id number]  IPM IMPLANT  N/A:  NUVASIVE 
SPINE      [id number] /



 Implanted: Qty: 1 on 07/10/2018 by Perry Perez MD  DEVICES  N/A         /

 

 Modulus Xlw, 89b33h98dh 15deg - [id number]  IPM IMPLANT  N/A:  NUVASIVE SPINE 
   2023  [id number] /



 Implanted: Qty: 1 on 2018 by Perry Perez MD  DEVICES  N/A         /



             [id number]

 

 Modulus Xlw, 45h14y31lw 15deg - [id number]  IPM IMPLANT  N/A:  NUVASIVE SPINE 
   2023  [id number] /



 Implanted: Qty: 1 on 2018 by Perry Perez MD  DEVICES  N/A         /



             [id number]

 

 Maxcess 4 Surgical Access Kit - [id number]  IPM IMPLANT  N/A:  NUVASIVE SPINE 
     [id number] /



 Implanted: 2018 (Quantity not on file)  DEVICES  N/A         /

 

 Unid Patient Specific Marty 5.5 - [id number]  IPM IMPLANT  N/A:  MEDICREA INT'L 
   10/13/2018  [id number] /



 Implanted: 2018 (Quantity not on file)  DEVICES  N/A         /



             [id number]

 

 Screw [id number] Bs Cnmas 7.5x80 T/C - [id number]  IPM IMPLANT  Posteri  
[employer]TRONIC      [id number] /



 Implanted: Qty: 1 on 2018 by Perry Perez MD  DEVICES  or: N/A  
SOFAMOR DANEK       /

 

 Screw [id number] 5.5 Mas 7.5x100 Cc - [id number]  IPM IMPLANT  Posteri  
MEDTRONIC      [id number] /



 Implanted: Qty: 1 on 2018 by Perry Perez MD  DEVICES  or: N/A  
SOFAMOR DANEK       /

 

 Screw [id number] 5.5 Mas 5.5x35 Cc - [id number]  IPM IMPLANT  Posteri  
MEDTRONIC      [id number] /



 Implanted: Qty: 4 on 2018 by Perry Perez MD  DEVICES  or: N/A  
SOFAMOR DANEK       /

 

 Screw [id number] 5.5 Mas 5.5x40 Cc - [id number]  IPM IMPLANT  Posteri  
[employer]TRONIC      [id number] /



 Implanted: Qty: 3 on 2018 by Perry Perez MD  DEVICES  or: N/A  
SOFAMOR DANEK       /

 

 Screw [id number] 5.5 Mas 5.5x45 Cc - [id number]  IPM IMPLANT  Posteri  
[employer]TRONIC      [id number] /



 Implanted: Qty: 12 on 2018 by Perry Perez MD  DEVICES  or: N/A  
SOFAMOR DANEK       /

 

 Screw [id number] 5.5 Mas 5.5x50 Cc - [id number]  IPM IMPLANT  Posteri  
MEDTRONIC      [id number] /



 Implanted: Qty: 2 on 2018 by Perry Perez MD  DEVICES  or: N/A  
SOFAMOR DANEK       /

 

 Screw [id number] 5.5 Mas 6.5x50 Cc - [id number]  IPM IMPLANT  Posteri  
MEDTRONIC      [id number] /



 Implanted: Qty: 1 on 2018 by Perry Perez MD  DEVICES  or: N/A  
SOFAMOR DANEK       /

 

 Screw [id number] 5.5 Mas 6.5x45 Cc - [id number]  IPM IMPLANT  Posteri  
MEDTRONIC      [id number] /



 Implanted: Qty: 3 on 2018 by Perry Perez MD  DEVICES  or: N/A  
SOFAMOR DANEK       /

 

 Screw [id number] 5.5 Mas 7.5x40 Cc - [id number]  IPM IMPLANT  Posteri  
MEDTRONIC      [id number] /



 Implanted: Qty: 1 on 2018 by Perry Perez MD  DEVICES  or: N/A  
SOFAMOR DANEK       /

 

 Screw [id number] 5.5 Mas 7.5x35 Cc - [id number]  IPM IMPLANT  Posteri  
MEDTRONIC      [id number] /



 Implanted: Qty: 1 on 2018 by Perry Perez MD  DEVICES  or: N/A  
SOFAMOR DANEK       /

 

 Screw [id number] 5.5 Mas 8.5x50 Cc - [id number]  IPM IMPLANT  Posteri  
MEDTRONIC      [id number] /



 Implanted: Qty: 1 on 2018 by Perry Perez MD  DEVICES  or: N/A  
SOFAMOR DANEK       /

 

 Screw [id number] 5.5 Mas 8.5x40 Cc - [id number]  IPM IMPLANT  Posteri  
MEDTRONIC      [id number] /



 Implanted: Qty: 1 on 2018 by Perry Perez MD  DEVICES  or: N/A  
SOFAMOR DANEK       /

 

 Screw [id number] 5.5 Mas 6.5x50 Cc - [id number]  IPM IMPLANT  Posteri  
MEDTRONIC      [id number] /



 Implanted: Qty: 1 on 2018 by Perry Perez MD  DEVICES  or: N/A  
SOFAMOR DANEK       /

 

 Connector [id number] 5/6-6.0 Clsd Lat 20 - [id number]  IPM IMPLANT  Posteri  
[employer]TRONIC      [id number] /



 Implanted: Qty: 1 on 2018 by Perry Perez MD  DEVICES  or: N/A  
SOFAMOR DANEK       /

 

 Connector [id number] 5/6-6.0 Clsd Lat 25 - [id number]  IPM IMPLANT  Posteri  
MEDTRONIC      [id number] /



 Implanted: Qty: 1 on 2018 by Perry Perez MD  DEVICES  or: N/A  
SOFAMOR DANEK       /

 

 Material Bone Hmsts Wtrsolbl 2.5g Ostene - [id number]  Orthopedic  N/A:      10
/  [id number] /



 Implanted: Qty: 1 on 07/10/2018 by Perry Perez MD  Trauma  N/A         /



   Implants          [id number]

 

 Material Bone Hmsts Wtrsolbl 2.5g Ostene - [id number]  Orthopedic  N/A:      10
/  [id number] /



 Implanted: Qty: 1 on 07/10/2018 by Perry Perez MD  Trauma  N/A         /



   Implants          [id number]

 

 Material Bone Hmsts Wtrsolbl 2.5g Ostene - [id number]  Orthopedic  N/A:      10
/  [id number] /



 Implanted: Qty: 1 on 2018 by Perry Perez MD  Trauma  N/A         /



   Implants          [id number]

 

 Material Bone Hmsts Wtrsolbl 2.5g Ostene - [id number]  Orthopedic  N/A:      10
/  [id number] /



 Implanted: Qty: 1 on 2018 by Perry Perez MD  Trauma  N/A         /



   Implants          [id number]

 

 Screw Bone Canc Lag 4x30mm Ns - [id number]  Orthopedic  Posteri  [employer]TRONIC    
  [id number] /



 Implanted: Qty: 32 on 2018 by Perry Perez MD  Trauma  or: N/A  
SOFAMOR DANEK       /



   Implants          

 

 Kit Dil M5 Xlif - [id number]  Spinal  N/A:  NUVASIVE      [id number] /



 Implanted: Qty: 1 on 07/10/2018 by Perry Perez MD  Implants  N/A         /

 

 Kit Dil M5 Xlif - [id number]  Spinal  N/A:  NUVASIVE      [id number] /



 Implanted: 2018 (Quantity not on file)  Implants  N/A         /

 

 Screw Set Std Ti 1/4in 32mm - [id number]  Spinal  Posteri  [employer]IC      
[id number] /



 Implanted: Qty: 2 on 2018 by Perry Perez MD  Implants  or: N/A  
SPINAL AND       /



       BIOLOGICS      







Procedures







 Procedure Name  Priority  Date/Time  Associated Diagnosis  Comments

 

 XR SPINE SCOLIOSIS 2-3  Routine  2019  2:09  Scoliosis of  Results for 
this



 VIEWS    PM CST  thoracolumbar spine,  procedure are in



       unspecified scoliosis  the results



       type  section.

 

 TRANSFUSE RED BLOOD  Routine  2018  5:55    



 CELLS    PM CDT    

 

 XR SPINE SCOLIOSIS 2-3  Routine  2018 12:14  Scoliosis of  Results for 
this



 VIEWS    PM CDT  thoracolumbar spine,  procedure are in



       unspecified scoliosis  the results



       type  section.

 

 POC GLUCOSE  Routine  2018  7:13    Results for this



     AM CDT    procedure are in



         the results



         section.

 

 POC GLUCOSE  Routine  2018  7:40    Results for this



     AM CDT    procedure are in



         the results



         section.

 

 POC GLUCOSE  Routine  2018  6:42    Results for this



     AM CDT    procedure are in



         the results



         section.

 

 POC GLUCOSE  Routine  2018  6:33    Results for this



     AM CDT    procedure are in



         the results



         section.

 

 POC GLUCOSE  Routine  2018  8:34    Results for this



     PM CDT    procedure are in



         the results



         section.

 

 POC GLUCOSE  Routine  2018  6:22    Results for this



     AM CDT    procedure are in



         the results



         section.

 

 ZZESTIMATED GFR  Routine  2018  4:00    Results for this



     AM CDT    procedure are in



         the results



         section.

 

 BASIC METABOLIC PANEL  Routine  2018  4:00    Results for this



     AM CDT    procedure are in



         the results



         section.

 

 POC GLUCOSE  Routine  2018  6:46    Results for this



     AM CDT    procedure are in



         the results



         section.

 

 POC GLUCOSE  Routine  2018  5:50    Results for this



     PM CDT    procedure are in



         the results



         section.

 

 POC GLUCOSE  Routine  2018  6:28    Results for this



     AM CDT    procedure are in



         the results



         section.

 

 SMEAR REVIEW  Routine  2018  5:00    Results for this



     AM CDT    procedure are in



         the results



         section.

 

 ZZESTIMATED GFR  Routine  2018  5:00    Results for this



     AM CDT    procedure are in



         the results



         section.

 

 THYROID STIMULATING  Routine  2018  5:00    Results for this



 HORMONE    AM CDT    procedure are in



         the results



         section.

 

 HC COMPLETE BLD COUNT  Routine  2018  5:00    Results for this



 W/AUTO DIFF    AM CDT    procedure are in



         the results



         section.

 

 BASIC METABOLIC PANEL  Routine  2018  5:00    Results for this



     AM CDT    procedure are in



         the results



         section.

 

 URINALYSIS SCREEN AND  Routine  2018  9:16    Results for this



 MICROSCOPY, WITH    PM CDT    procedure are in



 REFLEX TO CULTURE        the results



         section.

 

 URINE CULTURE  Routine  2018  9:16    Results for this



     PM CDT    procedure are in



         the results



         section.

 

 POC GLUCOSE  Routine  2018  9:10    Results for this



     PM CDT    procedure are in



         the results



         section.

 

 NM LUNG VENTILATION  STAT  2018  8:29    Results for this



 PERFUSION    PM CDT    procedure are in



         the results



         section.

 

 XR CHEST 1 VW PORTABLE  STAT  2018  5:11    Results for this



     PM CDT    procedure are in



         the results



         section.

 

 POC GLUCOSE  Routine  2018  4:59    Results for this



     PM CDT    procedure are in



         the results



         section.

 

 ZZESTIMATED GFR  Routine  2018 12:55    Results for this



     PM CDT    procedure are in



         the results



         section.

 

 COMPREHENSIVE  Routine  2018 12:55    Results for this



 METABOLIC PANEL    PM CDT    procedure are in



         the results



         section.

 

 HC COMPLETE BLD COUNT  Routine  2018 12:55    Results for this



 W/AUTO DIFF    PM CDT    procedure are in



         the results



         section.

 

 POC GLUCOSE  Routine  2018 11:07    Results for this



     AM CDT    procedure are in



         the results



         section.

 

 POC GLUCOSE  Routine  2018  6:43    Results for this



     AM CDT    procedure are in



         the results



         section.

 

 POC GLUCOSE  Routine  2018  9:08    Results for this



     PM CDT    procedure are in



         the results



         section.

 

 POC GLUCOSE  Routine  2018  5:00    Results for this



     PM CDT    procedure are in



         the results



         section.

 

 POC GLUCOSE  Routine  2018 11:16    Results for this



     AM CDT    procedure are in



         the results



         section.

 

 POC GLUCOSE  Routine  2018  6:36    Results for this



     AM CDT    procedure are in



         the results



         section.

 

 POC GLUCOSE  Routine  2018  8:43    Results for this



     PM CDT    procedure are in



         the results



         section.

 

 POC GLUCOSE  Routine  2018  4:55    Results for this



     PM CDT    procedure are in



         the results



         section.

 

 POC GLUCOSE  Routine  2018 11:07    Results for this



     AM CDT    procedure are in



         the results



         section.

 

 HEMOGLOBIN A1C  Routine  2018  7:02    Results for this



     AM CDT    procedure are in



         the results



         section.

 

 HC COMPLETE BLD COUNT  Routine  2018  7:02    Results for this



 W/AUTO DIFF    AM CDT    procedure are in



         the results



         section.

 

 POC GLUCOSE  Routine  2018  6:23    Results for this



     AM CDT    procedure are in



         the results



         section.

 

 POC GLUCOSE  Routine  2018  8:59    Results for this



     PM CDT    procedure are in



         the results



         section.

 

 URINALYSIS, AUTOMATED  Routine  2018  8:30    Results for this



 WITH MICROSCOPY    PM CDT    procedure are in



         the results



         section.

 

 ZZESTIMATED GFR  Routine  2018  7:45    Results for this



     PM CDT    procedure are in



         the results



         section.

 

 PREALBUMIN LEVEL  Routine  2018  7:45    Results for this



     PM CDT    procedure are in



         the results



         section.

 

 PHOSPHORUS LEVEL  Routine  2018  7:45    Results for this



     PM CDT    procedure are in



         the results



         section.

 

 MAGNESIUM LEVEL  Routine  2018  7:45    Results for this



     PM CDT    procedure are in



         the results



         section.

 

 COMPREHENSIVE  Routine  2018  7:45    Results for this



 METABOLIC PANEL    PM CDT    procedure are in



         the results



         section.

 

 POC GLUCOSE  Routine  2018  4:45    Results for this



     PM CDT    procedure are in



         the results



         section.

 

 POC GLUCOSE  Routine  2018 11:30    Results for this



     AM CDT    procedure are in



         the results



         section.

 

 POTASSIUM LEVEL  Routine  2018  8:22    Results for this



     AM CDT    procedure are in



         the results



         section.

 

 POC GLUCOSE  Routine  2018  7:24    Results for this



     AM CDT    procedure are in



         the results



         section.

 

 POC GLUCOSE  Routine  2018  9:43    Results for this



     PM CDT    procedure are in



         the results



         section.

 

 POC GLUCOSE  Routine  2018  5:21    Results for this



     PM CDT    procedure are in



         the results



         section.

 

 POC GLUCOSE  Routine  2018 11:29    Results for this



     AM CDT    procedure are in



         the results



         section.

 

 ZZESTIMATED GFR  Routine  2018  3:35    Results for this



     AM CDT    procedure are in



         the results



         section.

 

 HC COMPLETE BLD COUNT  Routine  2018  3:35    Results for this



 W/AUTO DIFF    AM CDT    procedure are in



         the results



         section.

 

 MAGNESIUM LEVEL  Routine  2018  3:35    Results for this



     AM CDT    procedure are in



         the results



         section.

 

 BASIC METABOLIC PANEL  Routine  2018  3:35    Results for this



     AM CDT    procedure are in



         the results



         section.

 

 POC GLUCOSE  Routine  2018  8:10    Results for this



     PM CDT    procedure are in



         the results



         section.

 

 POC GLUCOSE  Routine  2018  3:55    Results for this



     PM CDT    procedure are in



         the results



         section.

 

 POC GLUCOSE  Routine  2018 11:24    Results for this



     AM CDT    procedure are in



         the results



         section.

 

 POC GLUCOSE  Routine  2018  7:54    Results for this



     AM CDT    procedure are in



         the results



         section.

 

 HC COMPLETE BLD COUNT  Routine  2018  6:35    Results for this



 W/AUTO DIFF    AM CDT    procedure are in



         the results



         section.

 

 ZZESTIMATED GFR  Routine  2018  4:00    Results for this



     AM CDT    procedure are in



         the results



         section.

 

 BASIC METABOLIC PANEL  Routine  2018  4:00    Results for this



     AM CDT    procedure are in



         the results



         section.

 

 POC GLUCOSE  Routine  2018 10:20    Results for this



     PM CDT    procedure are in



         the results



         section.

 

 POC GLUCOSE  Routine  2018  4:34    Results for this



     PM CDT    procedure are in



         the results



         section.

 

 POC GLUCOSE  Routine  2018 12:06    Results for this



     PM CDT    procedure are in



         the results



         section.

 

 POC GLUCOSE  Routine  2018  8:13    Results for this



     AM CDT    procedure are in



         the results



         section.

 

 ZZESTIMATED GFR  Routine  2018  4:00    Results for this



     AM CDT    procedure are in



         the results



         section.

 

 BASIC METABOLIC PANEL  Routine  2018  4:00    Results for this



     AM CDT    procedure are in



         the results



         section.

 

 POC GLUCOSE  Routine  2018 10:01    Results for this



     PM CDT    procedure are in



         the results



         section.

 

 POC GLUCOSE  Routine  2018  4:36    Results for this



     PM CDT    procedure are in



         the results



         section.

 

 ZZESTIMATED GFR  Timed  2018  2:35    Results for this



     PM CDT    procedure are in



         the results



         section.

 

 BASIC METABOLIC PANEL  Timed  2018  2:35    Results for this



     PM CDT    procedure are in



         the results



         section.

 

 POC GLUCOSE  Routine  2018 12:05    Results for this



     PM CDT    procedure are in



         the results



         section.

 

 POC GLUCOSE  Routine  2018  7:59    Results for this



     AM CDT    procedure are in



         the results



         section.

 

 CBC HEMOGRAM  Routine  2018  3:25    Results for this



     AM CDT    procedure are in



         the results



         section.

 

 ZZESTIMATED GFR  Routine  2018  3:25    Results for this



     AM CDT    procedure are in



         the results



         section.

 

 BASIC METABOLIC PANEL  Routine  2018  3:25    Results for this



     AM CDT    procedure are in



         the results



         section.

 

 POC GLUCOSE  Routine  2018  9:05    Results for this



     PM CDT    procedure are in



         the results



         section.

 

 POC GLUCOSE  Routine  2018  4:08    Results for this



     PM CDT    procedure are in



         the results



         section.

 

 POC GLUCOSE  Routine  2018 11:40    Results for this



     AM CDT    procedure are in



         the results



         section.

 

 VANCOMYCIN LEVEL,  Timed  2018  9:26    Results for this



 TROUGH    AM CDT    procedure are in



         the results



         section.

 

 POC GLUCOSE  Routine  2018  8:20    Results for this



     AM CDT    procedure are in



         the results



         section.

 

 POC GLUCOSE  Routine  2018  4:38    Results for this



     AM CDT    procedure are in



         the results



         section.

 

 ZZESTIMATED GFR  Routine  2018  4:00    Results for this



     AM CDT    procedure are in



         the results



         section.

 

 BASIC METABOLIC PANEL  Routine  2018  4:00    Results for this



     AM CDT    procedure are in



         the results



         section.

 

 POC GLUCOSE  Routine  2018 11:50    Results for this



     PM CDT    procedure are in



         the results



         section.

 

 POC GLUCOSE  Routine  2018  9:09    Results for this



     PM CDT    procedure are in



         the results



         section.

 

 POC GLUCOSE  Routine  2018  5:04    Results for this



     PM CDT    procedure are in



         the results



         section.

 

 ECG 12-LEAD  STAT  2018  3:34    Results for this



     PM CDT    procedure are in



         the results



         section.

 

 TROPONIN  STAT  2018  3:04    Results for this



     PM CDT    procedure are in



         the results



         section.

 

 ZZESTIMATED GFR  Routine  2018  3:04    Results for this



     PM CDT    procedure are in



         the results



         section.

 

 BASIC METABOLIC PANEL  Routine  2018  3:04    Results for this



     PM CDT    procedure are in



         the results



         section.

 

 HC COMPLETE BLD COUNT  Routine  2018  3:04    Results for this



 W/AUTO DIFF    PM CDT    procedure are in



         the results



         section.

 

 POC GLUCOSE  Routine  2018 11:55    Results for this



     AM CDT    procedure are in



         the results



         section.

 

 POC GLUCOSE  Routine  2018  8:23    Results for this



     AM CDT    procedure are in



         the results



         section.

 

 POC GLUCOSE  Routine  2018  5:43    Results for this



     AM CDT    procedure are in



         the results



         section.

 

 POC GLUCOSE  Routine  2018 10:45    Results for this



     PM CDT    procedure are in



         the results



         section.

 

 POC GLUCOSE  Routine  2018  8:15    Results for this



     PM CDT    procedure are in



         the results



         section.

 

 VANCOMYCIN LEVEL,  Routine  2018  5:07    Results for this



 RANDOM    PM CDT    procedure are in



         the results



         section.

 

 POC GLUCOSE  Routine  2018  5:02    Results for this



     PM CDT    procedure are in



         the results



         section.

 

 POC GLUCOSE  Routine  2018 11:51    Results for this



     AM CDT    procedure are in



         the results



         section.

 

 POTASSIUM LEVEL  Routine  2018 11:26    Results for this



     AM CDT    procedure are in



         the results



         section.

 

 XR CHEST 1 VW PORTABLE  Timed  2018 10:14    Results for this



     AM CDT    procedure are in



         the results



         section.

 

 POC GLUCOSE  Routine  2018  7:58    Results for this



     AM CDT    procedure are in



         the results



         section.

 

 ZZESTIMATED GFR  Routine  2018  5:12    Results for this



     AM CDT    procedure are in



         the results



         section.

 

 PHOSPHORUS LEVEL  Routine  2018  5:12    Results for this



     AM CDT    procedure are in



         the results



         section.

 

 MAGNESIUM LEVEL  Routine  2018  5:12    Results for this



     AM CDT    procedure are in



         the results



         section.

 

 IONIZED CALCIUM  Routine  2018  5:12    Results for this



     AM CDT    procedure are in



         the results



         section.

 

 CBC HEMOGRAM  Routine  2018  5:12    Results for this



     AM CDT    procedure are in



         the results



         section.

 

 BASIC METABOLIC PANEL  Routine  2018  5:12    Results for this



     AM CDT    procedure are in



         the results



         section.

 

 POC GLUCOSE  Routine  2018 12:34    Results for this



     AM CDT    procedure are in



         the results



         section.

 

 POC GLUCOSE  Routine  2018  8:53    Results for this



     PM CDT    procedure are in



         the results



         section.

 

 POC GLUCOSE  Routine  2018  4:00    Results for this



     PM CDT    procedure are in



         the results



         section.

 

 POC GLUCOSE  Routine  2018 11:26    Results for this



     AM CDT    procedure are in



         the results



         section.

 

 XR PICC CHEST PORTABLE  Routine  2018 11:18  Scoliosis of lumbar  
Results for this



     AM CDT  spine, unspecified  procedure are in



       scoliosis type  the results



         section.

 

 HC CATH DUAL LUMEN  Routine  2018 10:34    Results for this



 PICC    AM CDT    procedure are in



         the results



         section.

 

 HC US GUIDED VASCULAR  Routine  2018 10:34    Results for this



 ACCESS    AM CDT    procedure are in



         the results



         section.

 

 HC CVL PICC INSERT 5  Routine  2018 10:34    Results for this



 YRS OR > W/O IMG GUID    AM CDT    procedure are in



         the results



         section.

 

 INTRAOPERATIVE  Routine  2018  7:24    



 MONITORING    AM CDT    

 

 POC GLUCOSE  Routine  2018  7:11    Results for this



     AM CDT    procedure are in



         the results



         section.

 

 PREPARE RBC  Timed  2018  6:40    



     AM CDT    

 

 PREPARE RBC  Timed  2018  6:40    Results for this



     AM CDT    procedure are in



         the results



         section.

 

 TYPE AND SCREEN  Timed  2018  6:40    Results for this



     AM CDT    procedure are in



         the results



         section.

 

 ZZESTIMATED GFR  Routine  2018  4:02    Results for this



     AM CDT    procedure are in



         the results



         section.

 

 MAGNESIUM LEVEL  Routine  2018  4:02    Results for this



     AM CDT    procedure are in



         the results



         section.

 

 PHOSPHORUS LEVEL  Routine  2018  4:02    Results for this



     AM CDT    procedure are in



         the results



         section.

 

 COMPREHENSIVE  Routine  2018  4:02    Results for this



 METABOLIC PANEL    AM CDT    procedure are in



         the results



         section.

 

 HC COMPLETE BLD COUNT  Routine  2018  3:50    Results for this



 W/AUTO DIFF    AM CDT    procedure are in



         the results



         section.

 

 POC GLUCOSE  Routine  2018  3:37    Results for this



     AM CDT    procedure are in



         the results



         section.

 

 POC GLUCOSE  Routine  07/15/2018 11:44    Results for this



     PM CDT    procedure are in



         the results



         section.

 

 ZZESTIMATED GFR  Timed  07/15/2018  8:00    Results for this



     PM CDT    procedure are in



         the results



         section.

 

 BASIC METABOLIC PANEL  Timed  07/15/2018  8:00    Results for this



     PM CDT    procedure are in



         the results



         section.

 

 POC GLUCOSE  Routine  07/15/2018  7:32    Results for this



     PM CDT    procedure are in



         the results



         section.

 

 US HEPATIC  Routine  07/15/2018  4:00    Results for this



     PM CDT    procedure are in



         the results



         section.

 

 POC GLUCOSE  Routine  07/15/2018  3:25    Results for this



     PM CDT    procedure are in



         the results



         section.

 

 ECG 12-LEAD  Routine  07/15/2018  2:40    Results for this



     PM CDT    procedure are in



         the results



         section.

 

 XR ABDOMEN 1 VW  STAT  07/15/2018 12:38    Results for this



 PORTABLE    PM CDT    procedure are in



         the results



         section.

 

 POC GLUCOSE  Routine  07/15/2018 11:30    Results for this



     AM CDT    procedure are in



         the results



         section.

 

 ZZESTIMATED GFR  Routine  07/15/2018 10:20    Results for this



     AM CDT    procedure are in



         the results



         section.

 

 ARTERIAL BLOOD GAS  Routine  07/15/2018 10:20    Results for this



     AM CDT    procedure are in



         the results



         section.

 

 BASIC METABOLIC PANEL  Routine  07/15/2018 10:20    Results for this



     AM CDT    procedure are in



         the results



         section.

 

 US DUPLEX VENOUS LOWER  STAT  07/15/2018  8:28    Results for this



 EXTREMITY BILATERAL    AM CDT    procedure are in



         the results



         section.

 

 POC GLUCOSE  Routine  07/15/2018  7:00    Results for this



     AM CDT    procedure are in



         the results



         section.

 

 ARTERIAL BLOOD GAS  Routine  07/15/2018  6:37    Results for this



     AM CDT    procedure are in



         the results



         section.

 

 XR CHEST 1 VW PORTABLE  STAT  07/15/2018  5:08    Results for this



     AM CDT    procedure are in



         the results



         section.

 

 ARTERIAL BLOOD GAS  STAT  07/15/2018  5:00    Results for this



     AM CDT    procedure are in



         the results



         section.

 

 CT ANGIOGRAM PE CHEST  STAT  07/15/2018  4:52    Results for this



     AM CDT    procedure are in



         the results



         section.

 

 POC GLUCOSE  Routine  07/15/2018  3:52    Results for this



     AM CDT    procedure are in



         the results



         section.

 

 POTASSIUM LEVEL  STAT  07/15/2018  3:35    Results for this



     AM CDT    procedure are in



         the results



         section.

 

 PHOSPHORUS LEVEL  STAT  07/15/2018  3:35    Results for this



     AM CDT    procedure are in



         the results



         section.

 

 BLOOD CULTURE, AEROBIC  Routine  07/15/2018  2:00    Results for this



 & ANAEROBIC    AM CDT    procedure are in



         the results



         section.

 

 BLOOD CULTURE, AEROBIC  Routine  07/15/2018  1:00    Results for this



 & ANAEROBIC    AM CDT    procedure are in



         the results



         section.

 

 MANUAL DIFFERENTIAL  Routine  07/15/2018 12:10    Results for this



     AM CDT    procedure are in



         the results



         section.

 

 CBC WITH PLATELET AND  Routine  07/15/2018 12:10    Results for this



 DIFFERENTIAL    AM CDT    procedure are in



         the results



         section.

 

 ZZESTIMATED GFR  Routine  07/15/2018 12:03    Results for this



     AM CDT    procedure are in



         the results



         section.

 

 IONIZED CALCIUM  Routine  07/15/2018 12:03    Results for this



     AM CDT    procedure are in



         the results



         section.

 

 PHOSPHORUS LEVEL  Routine  07/15/2018 12:03    Results for this



     AM CDT    procedure are in



         the results



         section.

 

 MAGNESIUM LEVEL  Routine  07/15/2018 12:03    Results for this



     AM CDT    procedure are in



         the results



         section.

 

 COMPREHENSIVE  Routine  07/15/2018 12:03    Results for this



 METABOLIC PANEL    AM CDT    procedure are in



         the results



         section.

 

 ARTERIAL BLOOD GAS  STAT  07/15/2018 12:00    Results for this



     AM CDT    procedure are in



         the results



         section.

 

 POC GLUCOSE  Routine  2018 11:59    Results for this



     PM CDT    procedure are in



         the results



         section.

 

 SMEAR REVIEW  STAT  2018  9:45    Results for this



     PM CDT    procedure are in



         the results



         section.

 

 HC COMPLETE BLD COUNT  STAT  2018  9:45    Results for this



 W/AUTO DIFF    PM CDT    procedure are in



         the results



         section.

 

 TRANSFUSE RED BLOOD  Routine  2018  9:32    



 CELLS    PM CDT    

 

 POC GLUCOSE  Routine  2018  8:06    Results for this



     PM CDT    procedure are in



         the results



         section.

 

 TRANSFUSE RED BLOOD  Routine  2018  6:45    



 CELLS    PM CDT    

 

 HEMOGLOBIN &  Routine  2018  4:00    Results for this



 HEMATOCRIT    PM CDT    procedure are in



         the results



         section.

 

 POC GLUCOSE  Routine  2018  3:39    Results for this



     PM CDT    procedure are in



         the results



         section.

 

 POC GLUCOSE  Routine  2018 11:23    Results for this



     AM CDT    procedure are in



         the results



         section.

 

 XR CHEST 1 VW PORTABLE  Routine  2018  9:03    Results for this



     AM CDT    procedure are in



         the results



         section.

 

 POC GLUCOSE  Routine  2018  8:18    Results for this



     AM CDT    procedure are in



         the results



         section.

 

 POC GLUCOSE  Routine  2018  5:21    Results for this



     AM CDT    procedure are in



         the results



         section.

 

 SMEAR REVIEW  Routine  2018  3:15    Results for this



     AM CDT    procedure are in



         the results



         section.

 

 HC COMPLETE BLD COUNT  Routine  2018  3:15    Results for this



 W/AUTO DIFF    AM CDT    procedure are in



         the results



         section.

 

 POC GLUCOSE  Routine  2018 12:00    Results for this



     AM CDT    procedure are in



         the results



         section.

 

 ZZESTIMATED GFR  Routine  2018 12:00    Results for this



     AM CDT    procedure are in



         the results



         section.

 

 BASIC METABOLIC PANEL  Routine  2018 12:00    Results for this



     AM CDT    procedure are in



         the results



         section.

 

 ECG 12-LEAD  STAT  2018 11:15    Results for this



     PM CDT    procedure are in



         the results



         section.

 

 SMEAR REVIEW  STAT  2018 11:00    Results for this



     PM CDT    procedure are in



         the results



         section.

 

 HC COMPLETE BLD COUNT  STAT  2018 11:00    Results for this



 W/AUTO DIFF    PM CDT    procedure are in



         the results



         section.

 

 ARTERIAL BLOOD GAS  STAT  2018 10:35    Results for this



     PM CDT    procedure are in



         the results



         section.

 

 ZZESTIMATED GFR  STAT  2018 10:30    Results for this



     PM CDT    procedure are in



         the results



         section.

 

 PHOSPHORUS LEVEL  STAT  2018 10:30    Results for this



     PM CDT    procedure are in



         the results



         section.

 

 MAGNESIUM LEVEL  STAT  2018 10:30    Results for this



     PM CDT    procedure are in



         the results



         section.

 

 BASIC METABOLIC PANEL  STAT  2018 10:30    Results for this



     PM CDT    procedure are in



         the results



         section.

 

 POC GLUCOSE  Routine  2018 10:09    Results for this



     PM CDT    procedure are in



         the results



         section.

 

 CT HEAD WO CONTRAST  STAT  2018  9:08    Results for this



     PM CDT    procedure are in



         the results



         section.

 

 ARTERIAL BLOOD GAS  Routine  2018  7:59    Results for this



     PM CDT    procedure are in



         the results



         section.

 

 SMEAR REVIEW  Routine  2018  6:45    Results for this



     PM CDT    procedure are in



         the results



         section.

 

 ZZESTIMATED GFR  Routine  2018  6:45    Results for this



     PM CDT    procedure are in



         the results



         section.

 

 BASIC METABOLIC PANEL  Routine  2018  6:45    Results for this



     PM CDT    procedure are in



         the results



         section.

 

 HC COMPLETE BLD COUNT  Routine  2018  6:45    Results for this



 W/AUTO DIFF    PM CDT    procedure are in



         the results



         section.

 

 MAGNESIUM LEVEL  STAT  2018  5:05    Results for this



     PM CDT    procedure are in



         the results



         section.

 

 LACTIC ACID, SYRINGE  STAT  2018  5:05    Results for this



     PM CDT    procedure are in



         the results



         section.

 

 IONIZED CALCIUM,  STAT  2018  5:05    Results for this



 ARTERIAL    PM CDT    procedure are in



         the results



         section.

 

 GLUCOSE LEVEL, SYRINGE  STAT  2018  5:05    Results for this



     PM CDT    procedure are in



         the results



         section.

 

 POTASSIUM, SYRINGE  STAT  2018  5:05    Results for this



     PM CDT    procedure are in



         the results



         section.

 

 HEMOGLOBIN, SYRINGE  STAT  2018  5:05    Results for this



     PM CDT    procedure are in



         the results



         section.

 

 SODIUM LEVEL, SYRINGE  STAT  2018  5:05    Results for this



     PM CDT    procedure are in



         the results



         section.

 

 ARTERIAL BLOOD GAS,  STAT  2018  5:05    Results for this



 CORRECTED    PM CDT    procedure are in



         the results



         section.

 

 OR FL > 1 HOUR  Routine  2018  4:41    Results for this



     PM CDT    procedure are in



         the results



         section.

 

 TRANSFUSE RED BLOOD  Routine  2018  4:34    



 CELLS    PM CDT    

 

 XR LUMBAR SPINE 1 VW  Routine  2018  4:31    Results for this



     PM CDT    procedure are in



         the results



         section.

 

 XR CERVICAL SPINE 1 VW  Routine  2018  4:31    Results for this



     PM CDT    procedure are in



         the results



         section.

 

 TRANSFUSE RED BLOOD  Routine  2018  4:10    



 CELLS    PM CDT    

 

 LACTIC ACID, SYRINGE  STAT  2018  3:50    Results for this



     PM CDT    procedure are in



         the results



         section.

 

 MAGNESIUM LEVEL  STAT  2018  3:50    Results for this



     PM CDT    procedure are in



         the results



         section.

 

 GLUCOSE LEVEL, SYRINGE  STAT  2018  3:50    Results for this



     PM CDT    procedure are in



         the results



         section.

 

 HEMOGLOBIN, SYRINGE  STAT  2018  3:50    Results for this



     PM CDT    procedure are in



         the results



         section.

 

 IONIZED CALCIUM,  STAT  2018  3:50    Results for this



 ARTERIAL    PM CDT    procedure are in



         the results



         section.

 

 SODIUM LEVEL, SYRINGE  STAT  2018  3:50    Results for this



     PM CDT    procedure are in



         the results



         section.

 

 POTASSIUM, SYRINGE  STAT  2018  3:50    Results for this



     PM CDT    procedure are in



         the results



         section.

 

 ARTERIAL BLOOD GAS,  STAT  2018  3:50    Results for this



 CORRECTED    PM CDT    procedure are in



         the results



         section.

 

 GLUCOSE LEVEL, SYRINGE  STAT  2018  2:11    Results for this



     PM CDT    procedure are in



         the results



         section.

 

 IONIZED CALCIUM,  STAT  2018  2:11    Results for this



 ARTERIAL    PM CDT    procedure are in



         the results



         section.

 

 HEMOGLOBIN, SYRINGE  STAT  2018  2:11    Results for this



     PM CDT    procedure are in



         the results



         section.

 

 POTASSIUM, SYRINGE  STAT  2018  2:11    Results for this



     PM CDT    procedure are in



         the results



         section.

 

 SODIUM LEVEL, SYRINGE  STAT  2018  2:11    Results for this



     PM CDT    procedure are in



         the results



         section.

 

 ARTERIAL BLOOD GAS,  STAT  2018  2:11    Results for this



 CORRECTED    PM CDT    procedure are in



         the results



         section.

 

 OR FL > 1 HOUR  Routine  2018 10:21    Results for this



     AM CDT    procedure are in



         the results



         section.

 

 ARTERIAL LINE  Routine  2018  9:17    



     AM CDT    









   Procedure Note - Kendal Ryan MD - 2018  9:17 AM CDT



Arterial line



   Performed by: KENDAL RYAN



   Authorized by: KENDAL RYAN



   



   Patient Location:  OR



   Start Time:  2018 7:52 AM



   End Time:  2018 7:54 AM



   Staff:



     Anesthesiologist:  KENDAL RYAN



     Performed by:  Anesthesiologist



   Pre-procedure: patient identified, IV checked, site and side verified, risks 
and benefits discussed, procedure verified, surgical consent complete, patient 
position confirmed, monitors and equipment checked and pre-op evaluation 
complete



   MSBT: antiseptic used, all elements of maximal sterile barrier technique 
followed, hand hygiene performed, cap/gown used by other personnel and 
solutions labeled



   TIme Out Performed:  2018 7:52 AM



   Indications:



     Indications: multiple ABGs and hemodynamic monitoring



   Anesthesia:



     Anesthesia:  General



   Procedure Details:



     Arterial Line placement:  Placed post induction



     Line placement site:  Radial



   Line placement side:  Right



     Arterial line gauge:  20 G



   Number of attempts:  1



   Ultrasound guidance used: No



   Post-procedure:



     Post-procedure:  Sterile dressing applied



     Post procedure circulation, sensation, movement:  Unchanged



     Patient tolerance:  Patient tolerated the procedure well with no immediate 
complications









 ZZESTIMATED GFR  STAT  2018  9:05 AM CDT    Results for this



         procedure are in the



         results section.

 

 BASIC METABOLIC PANEL  STAT  2018  9:05 AM CDT    Results for this



         procedure are in the



         results section.

 

 HC COMPLETE BLD COUNT  STAT  2018  9:05 AM CDT    Results for this



 W/AUTO DIFF        procedure are in the



         results section.

 

 TN AN ELECTIVE ENDOTRACHEAL  Routine  2018  8:10 AM CDT    



 AIRWAY        









   Procedure Note - Sulema Cazares CRNA - 2018  8:10 AM CDT



Airway



   Date/Time: 2018 7:51 AM



   Performed by: SULEMA CAZARES



   Authorized by: KENDAL RYAN



   



   Location:  OR



   Urgency:  Elective



   Difficult Airway: No



   Preoxygenated with 100% O2: Yes



   C-spine Precautions Maintained Throughout: Yes



   Mask Ventilation:  Easy mask



   Final Airway Type:  Endotracheal airway



   Final Endotracheal Airway:  ETT



   Cuffed: Yes



   Technique Used:  Direct laryngoscopy



   Insertion Site:  Oral



   Blade Type:  Val



   Laryngoscope Blade/Videolaryngoscope Blade Size:  3



   ETT Size (mm):  7.0



   Cuff at minimum occlusion pressure: Yes



   Measured from:  Teeth



   ETT to Teeth (cm):  21



   Placement Verified by: CO2 detection, direct visualization and equal breath 
sounds



   Laryngoscopic view:  Grade I - full view of glottis



   Rapid Sequence Induction (RSI): No



   Modified RSI: No



   Number of Attempts at Approach:  1



    Atraumatic insertion. Dentition remained intact.









 SURGICAL PATHOLOGY  Routine  2018  7:41 AM    Results for this



 REQUEST    CDT    procedure are in



         the results



         section.

 

 FUSION, SPINE,    2018  7:30 AM  Other secondary  



 LUMBAR, POSTERIOR    CDT  scoliosis  



 APPROACH        









   Case Notes







   PT TBA ON 7/10, DR WILBER MCKEON,  [place] PRO AXIS TABLE, STEALTH S7, 
OARM,



   AQUAMANTYS, BONE SCAPEL, MEDTRONIC INSTRUMENATION, EMG, MEPS, SSEPS

 

   Special Needs







   PT TBA ON 7/10, DR MERINO CO-SURGEON,  [place] PRO AXIS TABLE, STEALTH S7, 
OARM,



   AQUAMANTYS, BONE SCAPEL, MEDTRONIC INSTRUMENATION, EMG, MEPS, SSEPS









 FUSION, SPINE, LUMBAR, XLIF    2018  7:30 AM CDT  Other secondary 
scoliosis  









   Case Notes







   PT TBA ON 7/10, DR MERINO ASSISTING,  [place] PRO AXIS TABLE, STEALTH S7, 
OARM,



   AQUAMANTYS, BONE SCAPEL, MEDTRONIC INSTRUMENATION, EMG, MEPS, SSEPS

 

   Special Needs







   PT TBA ON 7/10, DR MERINO CO-SURGEON,  [place] PRO AXIS TABLE, STEALTH S7, 
OARM,



   AQUAMANTYS, BONE SCAPEL, MEDTRONIC INSTRUMENATION, EMG, MEPS, SSEPS









 POC GLUCOSE  Routine  2018  5:47 AM CDT    Results for this



         procedure are in the



         results section.

 

 PARTIAL THROMBOPLASTIN TIME  Routine  2018  4:28 AM CDT    Results for 
this



 (PTT)        procedure are in the



         results section.

 

 PROTHROMBIN TIME WITH INR  Routine  2018  4:28 AM CDT    Results for this



         procedure are in the



         results section.

 

 HC COMPLETE BLD COUNT W/AUTO  Routine  2018  4:28 AM CDT    Results for 
this



 DIFF        procedure are in the



         results section.

 

 POC GLUCOSE  Routine  2018  5:30 PM CDT    Results for this



         procedure are in the



         results section.

 

 PREPARE RBC  Timed  2018  3:00 PM CDT    Results for this



         procedure are in the



         results section.

 

 PREPARE RBC  Routine  2018  3:00 PM CDT    Results for this



         procedure are in the



         results section.

 

 TYPE AND SCREEN  Routine  2018  3:00 PM CDT    Results for this



         procedure are in the



         results section.

 

 XR ABDOMEN 1 VW PORTABLE  Routine  2018 12:51 PM CDT    Results for this



         procedure are in the



         results section.

 

 POC GLUCOSE  Routine  2018 11:39 AM CDT    Results for this



         procedure are in the



         results section.

 

 POC GLUCOSE  Routine  2018  7:43 AM CDT    Results for this



         procedure are in the



         results section.

 

 HEMOGLOBIN  Routine  2018  4:00 AM CDT    Results for this



         procedure are in the



         results section.

 

 POC GLUCOSE  Routine  2018  9:57 PM CDT    Results for this



         procedure are in the



         results section.

 

 XR SPINE SCOLIOSIS 2-3 VIEWS  Routine  2018  8:49 PM CDT    Results for 
this



         procedure are in the



         results section.

 

 POC GLUCOSE  Routine  2018  5:02 PM CDT    Results for this



         procedure are in the



         results section.

 

 POC GLUCOSE  Routine  2018 11:25 AM CDT    Results for this



         procedure are in the



         results section.

 

 POC GLUCOSE  Routine  2018  8:22 AM CDT    Results for this



         procedure are in the



         results section.

 

 ZZESTIMATED GFR  Routine  2018  3:20 AM CDT    Results for this



         procedure are in the



         results section.

 

 PROTHROMBIN TIME WITH INR  Routine  2018  3:20 AM CDT    Results for this



         procedure are in the



         results section.

 

 PARTIAL THROMBOPLASTIN TIME  Routine  2018  3:20 AM CDT    Results for 
this



 (PTT)        procedure are in the



         results section.

 

 HC COMPLETE BLD COUNT W/AUTO  Routine  2018  3:20 AM CDT    Results for 
this



 DIFF        procedure are in the



         results section.

 

 BASIC METABOLIC PANEL  Routine  2018  3:20 AM CDT    Results for this



         procedure are in the



         results section.

 

 POC GLUCOSE  Routine  07/10/2018  6:07 PM CDT    Results for this



         procedure are in the



         results section.

 

 CT THORACIC SPINE WO  Routine  07/10/2018  5:00 PM CDT    Results for this



 CONTRAST        procedure are in the



         results section.

 

 CT LUMBAR SPINE WO CONTRAST  Routine  07/10/2018  4:59 PM CDT    Results for 
this



         procedure are in the



         results section.

 

 LACTIC ACID LEVEL  Routine  07/10/2018  3:00 PM CDT    Results for this



         procedure are in the



         results section.

 

 LACTIC ACID, SYRINGE  STAT  07/10/2018  1:34 PM CDT    Results for this



         procedure are in the



         results section.

 

 ARTERIAL BLOOD GAS  STAT  07/10/2018  1:34 PM CDT    Results for this



         procedure are in the



         results section.

 

 SURGICAL PATHOLOGY REQUEST  Routine  07/10/2018  1:15 PM CDT    Results for 
this



         procedure are in the



         results section.

 

 POC GLUCOSE  Routine  07/10/2018  1:11 PM CDT    Results for this



         procedure are in the



         results section.

 

 LACTIC ACID, SYRINGE  STAT  07/10/2018 12:19 PM CDT    Results for this



         procedure are in the



         results section.

 

 IONIZED CALCIUM, ARTERIAL  STAT  07/10/2018 12:19 PM CDT    Results for this



         procedure are in the



         results section.

 

 GLUCOSE LEVEL, SYRINGE  STAT  07/10/2018 12:19 PM CDT    Results for this



         procedure are in the



         results section.

 

 POTASSIUM, SYRINGE  STAT  07/10/2018 12:19 PM CDT    Results for this



         procedure are in the



         results section.

 

 SODIUM LEVEL, SYRINGE  STAT  07/10/2018 12:19 PM CDT    Results for this



         procedure are in the



         results section.

 

 HEMOGLOBIN, SYRINGE  STAT  07/10/2018 12:19 PM CDT    Results for this



         procedure are in the



         results section.

 

 ARTERIAL BLOOD GAS,  STAT  07/10/2018 12:19 PM CDT    Results for this



 CORRECTED        procedure are in the



         results section.

 

 MAGNESIUM LEVEL  STAT  07/10/2018 12:05 PM CDT    Results for this



         procedure are in the



         results section.

 

 OR FL > 1 HOUR  Routine  07/10/2018 12:00 PM CDT    Results for this



         procedure are in the



         results section.

 

 LACTIC ACID, SYRINGE  STAT  07/10/2018 10:55 AM CDT    Results for this



         procedure are in the



         results section.

 

 IONIZED CALCIUM, ARTERIAL  STAT  07/10/2018 10:55 AM CDT    Results for this



         procedure are in the



         results section.

 

 MAGNESIUM LEVEL  STAT  07/10/2018 10:55 AM CDT    Results for this



         procedure are in the



         results section.

 

 GLUCOSE LEVEL, SYRINGE  STAT  07/10/2018 10:55 AM CDT    Results for this



         procedure are in the



         results section.

 

 HEMOGLOBIN, SYRINGE  STAT  07/10/2018 10:55 AM CDT    Results for this



         procedure are in the



         results section.

 

 POTASSIUM, SYRINGE  STAT  07/10/2018 10:55 AM CDT    Results for this



         procedure are in the



         results section.

 

 SODIUM LEVEL, SYRINGE  STAT  07/10/2018 10:55 AM CDT    Results for this



         procedure are in the



         results section.

 

 ARTERIAL BLOOD GAS,  STAT  07/10/2018 10:55 AM CDT    Results for this



 CORRECTED        procedure are in the



         results section.

 

 ARTERIAL LINE  Routine  07/10/2018  9:30 AM CDT    









   Procedure Note - Keith Reece MD - 07/10/2018  9:30 AM CDT



Arterial line



   Performed by: JUANITA GASPAR



   Authorized by: KEITH REECE



   



   Patient Location:  OR



   Start Time:  7/10/2018 7:46 AM



   End Time:  7/10/2018 7:49 AM



   Staff:



     Anesthesiologist:  KEITH REECE



     Resident/CRNA/AA:  JUANITA GASPAR



     Performed by:  Anesthesiologist



   Pre-procedure: patient identified, IV checked, site and side verified, risks 
and benefits discussed, procedure verified, surgical consent complete, patient 
position confirmed, monitors and equipment checked and pre-op evaluation 
complete



   MSBT: antiseptic used, all elements of maximal sterile barrier technique 
followed, hand hygiene performed, cap/gown used by other personnel and 
solutions labeled



   TIme Out Performed:  7/10/2018 7:45 AM



   Indications:



     Indications: multiple ABGs and hemodynamic monitoring



   Anesthesia:



     Anesthesia:  General



   Procedure Details:



     Arterial Line placement:  Placed post induction



     Line placement site:  Radial



   Line placement side:  Right



     Arterial line gauge:  20 G



   Number of attempts:  1



   Ultrasound guidance used: No



   Post-procedure:



     Post-procedure:  Sterile dressing applied



     Post procedure circulation, sensation, movement:  Normal and unchanged



     Patient tolerance:  Patient tolerated the procedure well with no immediate 
complications



   Notes:



      A-line placed by Dr. Reece









 TN AN ELECTIVE ENDOTRACHEAL AIRWAY  Routine  07/10/2018  8:53 AM CDT    









   Procedure Note - Juanita Gaspar - 07/10/2018  8:53 AM CDT



Airway



   Date/Time: 7/10/2018 7:47 AM



   Performed by: JUANITA GASPAR



   Authorized by: KEITH REECE



   



   Location:  OR



   Urgency:  Elective



   Difficult Airway: No



   Anesthesiologist:  KEITH REECE



   Resident/CRNA/AA:  JUANITA GASPAR



   Performed by:



      resident/CRNA/AA



   Preoxygenated with 100% O2: Yes



   C-spine Precautions Maintained Throughout: No



   Mask Ventilation:  Easy mask



   Final Airway Type:  Endotracheal airway



   Final Endotracheal Airway:  ETT



   Cuffed: Yes



   Technique Used:  Video laryngoscopy



   Devices/Methods Used in Placement:  Intubating stylet



   Insertion Site:  Oral



   Laryngoscope Blade/Videolaryngoscope Blade Size:  3



   ETT Size (mm):  7.0



   Cuff at minimum occlusion pressure: Yes



   Measured from:  Teeth



   ETT to Teeth (cm):  22



   Placement Verified by: CO2 detection, direct visualization and equal breath 
sounds



   Laryngoscopic view:  Grade I - full view of glottis



   Rapid Sequence Induction (RSI): No



   Modified RSI: No



   Number of Attempts at Approach:  2



    First attempt with MAC 3 blade via DL unsuccessful - no view.  Second 
attempt with Glidescope 3, easy intubation, grade 1 view, teeth/lips unchanged 
from preop condition









 LACTIC ACID, SYRINGE  STAT  07/10/2018  8:30 AM    Results for this



     CDT    procedure are in



         the results



         section.

 

 MAGNESIUM LEVEL  STAT  07/10/2018  8:30 AM    Results for this



     CDT    procedure are in



         the results



         section.

 

 GLUCOSE LEVEL,  STAT  07/10/2018  8:30 AM    Results for this



 SYRINGE    CDT    procedure are in



         the results



         section.

 

 HEMOGLOBIN, SYRINGE  STAT  07/10/2018  8:30 AM    Results for this



     CDT    procedure are in



         the results



         section.

 

 IONIZED CALCIUM,  STAT  07/10/2018  8:30 AM    Results for this



 ARTERIAL    CDT    procedure are in



         the results



         section.

 

 POTASSIUM, SYRINGE  STAT  07/10/2018  8:30 AM    Results for this



     CDT    procedure are in



         the results



         section.

 

 SODIUM LEVEL,  STAT  07/10/2018  8:30 AM    Results for this



 SYRINGE    CDT    procedure are in



         the results



         section.

 

 ARTERIAL BLOOD GAS,  STAT  07/10/2018  8:30 AM    Results for this



 CORRECTED    CDT    procedure are in



         the results



         section.

 

 FUSION, SPINE,    07/10/2018  7:30 AM  Other secondary  



 LUMBAR, XLIF    CDT  scoliosis  









   Case Notes







   DR JEANIE MOHR TO DO EXPOSURE, C-ARM. AMSCO BED, NUVASIVE INSTRUMENATION, EMG, 
SSEP,



   MEPS

 

   Special Needs







   DR JEANIE MOHR TO DO EXPOSURE, C-ARM. AMSCO BED, NUVASIVE INSTRUMENATION, EMG, 
SSEP,



   MEPS









 POC GLUCOSE  Routine  07/10/2018  6:54    Results for this



     AM CDT    procedure are in



         the results



         section.

 

 XR CHEST 2 VW  Routine  2018  5:24  Preop testing  Results for this



     PM CDT    procedure are in



         the results



         section.

 

 URINE CULTURE  Routine  2018  4:50    Results for this



     PM CDT    procedure are in



         the results



         section.

 

 ECG 12-LEAD  Routine  2018  4:44  Preop testing  Results for this



     PM CDT    procedure are in



         the results



         section.

 

 ZZESTIMATED GFR  Routine  2018  4:21    Results for this



     PM CDT    procedure are in



         the results



         section.

 

 BASIC METABOLIC PANEL  Routine  2018  4:21  Preop testing  Results for 
this



     PM CDT    procedure are in



         the results



         section.

 

 CBC HEMOGRAM  Routine  2018  4:21  Preop testing  Results for this



     PM CDT    procedure are in



         the results



         section.

 

 URINALYSIS SCREEN AND  Routine  2018  4:21  Preop testing  Results for 
this



 MICROSCOPY, WITH    PM CDT    procedure are in



 REFLEX TO CULTURE        the results



         section.

 

 HEPATIC FUNCTION PANEL  Routine  2018  4:21  Preop testing  Results for 
this



     PM CDT    procedure are in



         the results



         section.

 

 PARTIAL THROMBOPLASTIN  Routine  2018  4:21  Preop testing  Results for 
this



 TIME (PTT)    PM CDT    procedure are in



         the results



         section.

 

 PROTHROMBIN TIME WITH  Routine  2018  4:21  Preop testing  Results for 
this



 INR    PM CDT    procedure are in



         the results



         section.

 

 TYPE AND SCREEN  Routine  2018  4:21  Preop testing  Results for this



     PM CDT    procedure are in



         the results



         section.

 

 VITAMIN D 25 HYDROXY  Routine  2018  4:21  Preop testing  Results for 
this



 LEVEL    PM CDT    procedure are in



         the results



         section.

 

 PARATHYROID HORMONE  Routine  2018  4:21  Preop testing  Results for this



     PM CDT    procedure are in



         the results



         section.

 

 PHOSPHORUS LEVEL  Routine  2018  4:21  Preop testing  Results for this



     PM CDT    procedure are in



         the results



         section.

 

 MAGNESIUM LEVEL  Routine  2018  4:21  Preop testing  Results for this



     PM CDT    procedure are in



         the results



         section.

 

 HEMOGLOBIN A1C  Routine  2018  4:21  Preop testing  Results for this



     PM CDT    procedure are in



         the results



         section.

 

 BONE DENSITY  Routine  2018  5:25  Age-related  Results for this



 PERIPHERAL    PM CDT  osteoporosis with  procedure are in



       current pathological  the results



       fracture, initial  section.



       encounter  

 

 BONE DENSITY  Routine  2018  5:25  Age-related  Results for this



     PM CDT  osteoporosis with  procedure are in



       current pathological  the results



       fracture, initial  section.



       encounter  



after 2018



Results

XR Spine Scoliosos 2-3 Views (2019  2:09 PM CST)Only the most recent of3 
resultswithin the time period is included.





 Narrative  Performed At

 

 EXAMINATION:XR SPINE SCOLIOSIS 2-3 VIEWS



  [facility]



  



  



 CLINICAL HISTORY:M41.9 Scoliosisunspecified, SCOLIOSIS



  



  



  



  



  



  



  



 COMPARISON: 2018.



  



  



  



 IMPRESSION:



  



  



  



 9 views of the spine are submitted per scoliosis protocol.



  



  



  



 Again noted is posterior fusion extending from T4 through the sacroiliac  



 joints. Interbody fusions are again noted at L2-3, L4-5, and L5-S1. ACDF  



 is again seen at C5-6.



  



  



  



 There is interval fracture of the left sacroiliac screw.



  



  



  



 Thoracic dextroscoliosis is similar to the comparison study, measuring  



 35 degrees. Cervicothoracic and lumbar levocurvature is again seen as  



 well.



  



  



  



 The plumbline measures 3.7 cm left of the mid sacral line. 



  



  



  



 The plumbline measures 5.8 cm ventral to the posterior superior corner  



 of S1.



  



  



  



 A pump overlies the right abdomen. An intrathecal catheter terminates at  



 the T11 level.



  



  



  



 There are bilateral hip arthroplasties.



  



  



  



 [id number]



  



   









 Procedure Note

 

  Interface, Radiology Results Incoming - 2019  2:47 PM CST



EXAMINATION:  XR SPINE SCOLIOSIS 2-3 VIEWS



 



 CLINICAL HISTORY:  M41.9 Scoliosis  unspecified, SCOLIOSIS



 



 



 



 COMPARISON: 2018.



 



 IMPRESSION:



 



 9 views of the spine are submitted per scoliosis protocol.



 



 Again noted is posterior fusion extending from T4 through the sacroiliac 
joints. Interbody fusions are again noted at L2-3, L4-5, and L5-S1. ACDF is 
again seen at C5-6.



 



 There is interval fracture of the left sacroiliac screw.



 



 Thoracic dextroscoliosis is similar to the comparison study, measuring 35 
degrees. Cervicothoracic and lumbar levocurvature is again seen as well.



 



 The plumbline measures 3.7 cm left of the mid sacral line.



 



 The plumbline measures 5.8 cm ventral to the posterior superior corner of S1.



 



 A pump overlies the right abdomen. An intrathecal catheter terminates at the 
T11 level.



 



 There are bilateral hip arthroplasties.



 



 [facility]-[id number]









 Performing Organization  Address  City/[facility]  Phone Number

 

  [facility]  [address] [place] [address]  



Transfuse RBC (2018  5:55 PM CDT)Only the most recent of4 resultswithin 
the time period is included.POC glucose (2018  7:13 AM CDT)Only the most 
recent of87 resultswithin the time period is included.





 Component  Value  Ref Range  Performed At

 

 POC glucose  106 (H)  65 - 99 mg/dL  [facility] DEPARTMENT OF PATHOLOGY



   Comment:    AND GENOMIC MEDICINE



   [facility] Notified RN    



   Meter ID: [id number]    



   : Edwin KESSLER    



       









 Performing Organization  Address  [facility]  Phone Number

 

 [facility] DEPARTMENT OF PATHOLOGY AND  [address] [place] [address]  



 [facility]      



Estimated GFR (2018  4:00 AM CDT)Only the most recent of22 resultswithin 
the time period is included.





 Component  Value  Ref Range  Performed At

 

 GFR Non Af Amer  74  mL/min/1.73 m2  [facility] DEPARTMENT OF



       PATHOLOGY AND GENOMIC



       MEDICINE

 

 GFR Af Amer  90  mL/min/1.73 m2  [facility] DEPARTMENT OF



   Comment:    PATHOLOGY AND GENOMIC



   Chronic kidney disease: <60 mL/min/1.73m2    MEDICINE



   Kidney failure: <15 mL/min/1.73m2    



   The estimated GFR is calculated from the IDMS-traceable Modification of Diet
    



   in Renal Disease Equation. The accuracy of the calculation is poor when the 
   



   creatinine is normal. Calculated values >90 mL/min/1.73m2 are not reported. 
   



   This equation has not been validated in children (<18 years), pregnant    



   women, the elderly (>70 years), or ethnic groups other than Caucasians and  
  



    Americans.    



       









 Specimen

 

 Plasma specimen









 Performing Organization  Address  City/[facility]  Phone Number

 

 [facility] DEPARTMENT OF PATHOLOGY AND  [address] [place], [place] [id number]  



 [employer] [facility]      



Basic metabolic panel (2018  4:00 AM CDT)Only the most recent of18 
resultswithin the time period is included.





 Component  Value  Ref Range  Performed At

 

 Sodium  139  135 - 148 mEq/L  [facility] DEPARTMENT OF PATHOLOGY AND GENOMIC MEDICINE

 

 Potassium  3.9  3.5 - 5.0 mEq/L  [facility] DEPARTMENT OF PATHOLOGY AND GENOMIC 
MEDICINE

 

 Chloride  100  98 - 112 mEq/L  [facility] DEPARTMENT OF PATHOLOGY AND GENOMIC MEDICINE

 

 CO2  27  24 - 31 mEq/L  [facility] DEPARTMENT OF PATHOLOGY AND GENOMIC MEDICINE

 

 Anion gap  12@ANIO  7 - 15 mEq/L  [facility] DEPARTMENT OF PATHOLOGY AND GENOMIC 
MEDICINE

 

 BUN  9  6 - 20 mg/dL  [facility] DEPARTMENT OF PATHOLOGY AND GENOMIC MEDICINE

 

 Creatinine  0.8  0.5 - 0.9 mg/dL  [facility] DEPARTMENT OF PATHOLOGY AND GENOMIC 
MEDICINE

 

 Glucose  102 (H)  65 - 99 mg/dL  [facility] DEPARTMENT OF PATHOLOGY AND GENOMIC 
MEDICINE

 

 Calcium  9.1  8.3 - 10.2 mg/dL  [facility] DEPARTMENT OF PATHOLOGY AND GENOMIC 
MEDICINE









 Specimen

 

 Plasma specimen









 Performing Organization  Address  City/[facility]/Zipcode  Phone Number

 

 [facility] DEPARTMENT OF PATHOLOGY AND  [address] [place], [place] [id number]  



 GENOMIC MEDICINE      



Smear review (2018  5:00 AM CDT)Only the most recent of5 resultswithin 
the time period is included.





 Component  Value  Ref Range  Performed At

 

 Platelet slide review  Solitario adequate    [facility] DEPARTMENT OF PATHOLOGY AND GENOMIC



       MEDICINE

 

 Anisocytosis  Moderate    [facility] DEPARTMENT OF PATHOLOGY AND GENOMIC



       MEDICINE

 

 Polychromasia  Moderate    [facility] DEPARTMENT OF PATHOLOGY AND GENOMIC



       MEDICINE

 

 Ovalocytes  Moderate    [facility] DEPARTMENT OF PATHOLOGY AND GENOMIC



       MEDICINE

 

 Enlarged platelets  Moderate (A)    [facility] DEPARTMENT OF PATHOLOGY AND GENOMIC



       MEDICINE









 Performing Organization  Address  City/State/Zipcode  Phone Number

 

 [facility] DEPARTMENT OF PATHOLOGY AND  [address] [place] [address]  



 GENOMIC MEDICINE      



CBC with platelet and differential (2018  5:00 AM CDT)Only the most 
recent of15 resultswithin the time period is included.





 Component  Value  Ref Range  Performed At

 

 WBC  4.06 (L)  4.50 - 11.00 k/uL  [facility] DEPARTMENT OF



       PATHOLOGY AND GENOMIC



       MEDICINE

 

 RBC  2.73 (L)  4.20 - 5.50 m/uL  [facility] DEPARTMENT OF



       PATHOLOGY AND GENOMIC



       MEDICINE

 

 HGB  8.4 (L)  12.0 - 16.0 g/dL  [facility] DEPARTMENT OF



       PATHOLOGY AND GENOMIC



       MEDICINE

 

 HCT  27.1 (L)  37.0 - 47.0 %  [facility] DEPARTMENT OF



       PATHOLOGY AND GENOMIC



       MEDICINE

 

 MCV  99.3  82.0 - 100.0 fL  [facility] DEPARTMENT OF



       PATHOLOGY AND GENOMIC



       MEDICINE

 

 MCH  30.8  27.0 - 34.0 pg  [facility] DEPARTMENT OF



       PATHOLOGY AND GENOMIC



       MEDICINE

 

 MCHC  31.0  31.0 - 37.0 g/dL  [facility] DEPARTMENT OF



       PATHOLOGY AND GENOMIC



       MEDICINE

 

 RDW - SD  65.3 (H)  37.0 - 55.0 fL  [facility] DEPARTMENT OF



       PATHOLOGY AND GENOMIC



       MEDICINE

 

 MPV  11.2  8.8 - 13.2 fL  [facility] DEPARTMENT OF



       PATHOLOGY AND GENOMIC



       MEDICINE

 

 Platelet count  151  150 - 400 k/uL  [facility] DEPARTMENT OF



       PATHOLOGY AND GENOMIC



       MEDICINE

 

 Nucleated RBC  0.00  /100 WBC  [facility] DEPARTMENT OF



       PATHOLOGY AND GENOMIC



       MEDICINE

 

 Neutrophils  44.2  39.0 - 69.0 %  [facility] DEPARTMENT OF



       PATHOLOGY AND GENOMIC



       MEDICINE

 

 Lymphocytes  39.9  25.0 - 45.0 %  [facility] DEPARTMENT OF



       PATHOLOGY AND GENOMIC



       MEDICINE

 

 Monocytes  13.8 (H)  0.0 - 10.0 %  [facility] DEPARTMENT OF



       PATHOLOGY AND GENOMIC



       MEDICINE

 

 Eosinophils  1.2  0.0 - 5.0 %  [facility] DEPARTMENT OF



       PATHOLOGY AND GENOMIC



       MEDICINE

 

 Basophils  0.2  0.0 - 1.0 %  [facility] DEPARTMENT OF



       PATHOLOGY AND GENOMIC



       MEDICINE

 

 Immature granulocytes  0.7Comment:  0.0 - 1.0 %  [facility] DEPARTMENT OF



   "Immature    PATHOLOGY AND GENOMIC



   granulocytes"    MEDICINE



   (promyelocytes,    



   myelocytes,    



   metamyelocytes)    









 Specimen

 

 Blood









 Performing Organization  Address  City/[facility]  Phone Number

 

 [facility] DEPARTMENT OF PATHOLOGY AND  [address] [place] [address]  



 [facility]      



Thyroid stimulating hormone (2018  5:00 AM CDT)





 Component  Value  Ref Range  Performed At

 

 TSH  2.00  0.27 - 4.20 uIU/mL  [facility] DEPARTMENT OF PATHOLOGY AND GENOMIC MEDICINE









 Specimen

 

 Plasma specimen









 Performing Organization  Address  City/[facility]  Phone Number

 

 [facility] DEPARTMENT OF PATHOLOGY AND  [address] [place] [phone]  



 [facility]      



Urinalysis screen and microscopy, with reflex to culture (2018  9:16 PM 
CDT)Only the most recent of2 resultswithin the time period is included.





 Component  Value  Ref Range  Performed At

 

 Specimen site  Clean catch    [facility] DEPARTMENT OF PATHOLOGY AND



       GENOMIC MEDICINE

 

 Color, UA  Straw    [facility] DEPARTMENT OF PATHOLOGY AND



       GENOMIC MEDICINE

 

 Appearance, UA  Clear    [facility] DEPARTMENT OF PATHOLOGY AND



       GENOMIC MEDICINE

 

 Specific gravity, UA  1.006  1.001 - 1.035  [facility] DEPARTMENT OF PATHOLOGY AND



       GENOMIC MEDICINE

 

 pH, UA  6.0  5.0 - 8.5  [facility] DEPARTMENT OF PATHOLOGY AND



       GENOMIC MEDICINE

 

 Protein, UA  Negative  Negative  [facility] DEPARTMENT OF PATHOLOGY AND



       GENOMIC MEDICINE

 

 Glucose, UA  Negative  Negative  [facility] DEPARTMENT OF PATHOLOGY AND



       GENOMIC MEDICINE

 

 Ketones, UA  Negative  Negative  [facility] DEPARTMENT OF PATHOLOGY AND



       GENOMIC MEDICINE

 

 Bilirubin, UA  Negative  Negative  [facility] DEPARTMENT OF PATHOLOGY AND



       GENOMIC MEDICINE

 

 Blood, UA  Negative  Negative  [facility] DEPARTMENT OF PATHOLOGY AND



       GENOMIC MEDICINE

 

 Nitrite, UA  Negative  Negative  [facility] DEPARTMENT OF PATHOLOGY AND



       GENOMIC MEDICINE

 

 Urobilinogen, UA  <2.0  <2.0  [facility] DEPARTMENT OF PATHOLOGY AND



       GENOMIC MEDICINE

 

 Leukocyte esterase, UA  Negative  Negative  [facility] DEPARTMENT OF PATHOLOGY AND



       GENOMIC MEDICINE

 

 Epithelial cells, UA  5  /HPF  [facility] DEPARTMENT OF PATHOLOGY AND



       GENOMIC MEDICINE

 

 WBC, UA  1  0 - 4 /HPF  [facility] DEPARTMENT OF PATHOLOGY AND



       GENOMIC MEDICINE

 

 RBC, UA  None seen  0 - 5 /HPF  [facility] DEPARTMENT OF PATHOLOGY AND



       GENOMIC MEDICINE

 

 Bacteria, UA  Few  None seen  [facility] DEPARTMENT OF PATHOLOGY AND



       GENOMIC MEDICINE

 

 Yeast, UA  None seen    [facility] DEPARTMENT OF PATHOLOGY AND



       GENOMIC MEDICINE

 

 Yeast with pseudohyphae, UA  None seen    [facility] DEPARTMENT OF PATHOLOGY AND



       GENOMIC MEDICINE









 Specimen

 

 Urine









 Performing Organization  Address  City/[facility]/Zipcode  Phone Number

 

 [facility] DEPARTMENT OF PATHOLOGY AND  [address] [place] [address]  



 GENOMIC MEDICINE      



Urine culture (2018  9:16 PM CDT)Only the most recent of2 resultswithin 
the time period is included.





 Component  Value  Ref Range  Performed At

 

 Urine culture  SEE COMMENTComment: Bacteriuria    [facility] DEPARTMENT OF PATHOLOGY



   screen negative.    AND GENOMIC MEDICINE









 Performing Organization  Address  City/[facility]  Phone Number

 

 [facility] DEPARTMENT OF PATHOLOGY AND  [address] [place] [phone]  



 GENOMIC MEDICINE      



NM Lung Ventilation Perfusion (2018  8:29 PM CDT)





 Narrative  Performed At

 

 CLINICAL HISTORY: PE suspectedlow pretest prob, Taqchycardia unknow   
[facility]



 etiology- S P Spine sx



  



  



  



 TECHNIQUE:



  



 The patient breathed 15-20 mCi of xenon-133 gas through a closed  



 ventilation system while dynamic imaging of the lungs was performed in  



 the posterior and anterior projections. The patient was then injected  



 with 5 mCi of technetium-99m-MAA intravenously, 



  



 followed by imaging of the lungs in anterior, posterior, and oblique  



 projections. 



  



  



  



 FINDINGS: 



  



 Small perfusion defect left upper lobe likely related to aortic arch.  



 Mildly reduced perfusion and ventilation right upper lobe posteriorly. 



  



  



  



 IMPRESSION: 



  



  



  



 Low Probability for pulmonary embolism.



  



  



  



  



  



  



  



  



  



 [facility]



  



   









 Procedure Note

 

  Interface, Radiology Results Incoming - 2018  8:35 PM CDT



CLINICAL HISTORY: PE suspected  low pretest prob, Taqchycardia unknow etiology- 
S P Spine sx



 



 TECHNIQUE:



 The patient breathed 15-20 mCi of xenon-133 gas through a closed ventilation 
system while dynamic imaging of the lungs was performed in the posterior and 
anterior projections. The patient was then injected with 5



 mCi of technetium-99m-MAA intravenously,



 followed by imaging of the lungs in anterior, posterior, and oblique 
projections.



 



 FINDINGS:



 Small perfusion defect left upper lobe likely related to aortic arch. Mildly 
reduced perfusion and ventilation right upper lobe posteriorly.



 



 IMPRESSION:



 



     Low Probability for pulmonary embolism.



 



 



 



 



 [facility]









 Performing Organization  Address  City/[facility]/Zipcode  Phone Number

 

  [facility]  [address] [place] [address]  



XR Chest 1 Vw Portable (2018  5:11 PM CDT)Only the most recent of4 
resultswithin the time period is included.





 Narrative  Performed At

 

 EXAMINATION: Portable chest x-ray



  [facility]



  



  



 CLINICAL HISTORY:sob



  



  



  



  



  



 COMPARISON: Most recent available chest x-ray.



  



  



  



 Heart size is enlarged.



  



 A PICC line is located in the superior vena cava.



  



 There are transpedicular screws and stabilizing bars throughout the  



 dorsal lumbar spine scoliotic curvature. There is internal interbody  



 fixation of the lower cervical spine



  



  



  



 IMPRESSION:



  



  



  



 1.There are decreased lung volumes with crowding of blood vessels in  



 the infrahilar regions. There are no focal infiltrates.



  



 2.Vascular congestion has improved



  



 3.Mild retrocardiac atelectasis and a trace of pleural fluid.



  



 4.No pneumothorax.



  



  



  



  



  



  



  



 [id number]



  



   









 Procedure Note

 

 Hm Interface, Radiology Results Incoming - 2018  5:23 PM CDT



EXAMINATION: Portable chest x-ray



 



 CLINICAL HISTORY:  sob



 



 



 COMPARISON: Most recent available chest x-ray.



 



 Heart size is enlarged.



 A PICC line is located in the superior vena cava.



 There are transpedicular screws and stabilizing bars throughout the dorsal 
lumbar spine scoliotic curvature. There is internal interbody fixation of the 
lower cervical spine



 



 IMPRESSION:



 



 1.  There are decreased lung volumes with crowding of blood vessels in the 
infrahilar regions. There are no focal infiltrates.



 2.  Vascular congestion has improved



 3.  Mild retrocardiac atelectasis and a trace of pleural fluid.



 4.  No pneumothorax.



 



 



 



 [id number]









 Performing Organization  Address  City/State/Zipcode  Phone Number

 

 [facility]  [address] [place] [address]  



Comprehensive metabolic panel (2018 12:55 PM CDT)Only the most recent of4 
resultswithin the time period is included.





 Component  Value  Ref Range  Performed At

 

 Sodium  140  135 - 148 mEq/L  [facility] DEPARTMENT OF



       PATHOLOGY AND GENOMIC



       MEDICINE

 

 Potassium  3.3 (L)  3.5 - 5.0 mEq/L  [facility] DEPARTMENT OF



       PATHOLOGY AND GENOMIC



       MEDICINE

 

 Chloride  101  98 - 112 mEq/L  [facility] DEPARTMENT OF



       PATHOLOGY AND GENOMIC



       MEDICINE

 

 CO2  25  24 - 31 mEq/L  [facility] DEPARTMENT OF



       PATHOLOGY AND GENOMIC



       MEDICINE

 

 Anion gap  14@ANIO  7 - 15 mEq/L  [facility] DEPARTMENT OF



       PATHOLOGY AND GENOMIC



       MEDICINE

 

 BUN  7  6 - 20 mg/dL  [facility] DEPARTMENT OF



       PATHOLOGY AND GENOMIC



       MEDICINE

 

 Creatinine  0.7  0.5 - 0.9 mg/dL  [facility] DEPARTMENT OF



       PATHOLOGY AND GENOMIC



       MEDICINE

 

 Glucose  125 (H)  65 - 99 mg/dL  [facility] DEPARTMENT OF



       PATHOLOGY AND GENOMIC



       MEDICINE

 

 Calcium  8.5  8.3 - 10.2 mg/dL  [facility] DEPARTMENT OF



       PATHOLOGY AND GENOMIC



       MEDICINE

 

 Protein  6.9  6.3 - 8.3 g/dL  [facility] DEPARTMENT OF



   Comment:    PATHOLOGY AND GENOMIC



   [place] 4.6-7.0 g/dL    MEDICINE



   1 week 4.4-7.6 g/dL    



   7 months-1year5.1-7.3 g/dL    



   1-2 years5.6-7.5 g/dL    



   >3 years6.0-8.0 g/dL    



    6.3-8.3 g/dL    



       

 

 Albumin  2.4 (L)  3.5 - 5.0 g/dL  [facility] DEPARTMENT OF



       PATHOLOGY AND GENOMIC



       MEDICINE

 

 A/G ratio  0.5 (L)  0.7 - 3.8  [facility] DEPARTMENT OF



       PATHOLOGY AND GENOMIC



       MEDICINE

 

 Alkaline phosphatase  95  35 - 104 U/L  [facility] DEPARTMENT OF



       PATHOLOGY AND GENOMIC



       MEDICINE

 

 AST  23  10 - 35 U/L  [facility] DEPARTMENT OF



       PATHOLOGY AND GENOMIC



       MEDICINE

 

 ALT  32  5 - 50 U/L  [facility] DEPARTMENT OF



       PATHOLOGY AND GENOMIC



       MEDICINE

 

 Total bilirubin  <0.2  0.0 - 1.2 mg/dL  [facility] DEPARTMENT OF



       PATHOLOGY AND GENOMIC



       MEDICINE









 Specimen

 

 Plasma specimen









 Performing Organization  Address  City/State/[facility]  Phone Number

 

 [facility] DEPARTMENT OF PATHOLOGY AND  [address] [place] [address]      



Hemoglobin A1c (2018  7:02 AM CDT)Only the most recent of2 resultswithin 
the time period is included.





 Component  Value  Ref Range  Performed At

 

 Hemoglobin A1C  5.5  4.0 - 5.6 %  [facility] DEPARTMENT OF PATHOLOGY



   Comment:    AND GENOMIC MEDICINE



   HbA1c cutoffs for diagnosing diabetes:    



   4.0% - 5.6%=normal    



   5.7% - 6.4%=increased risk for diabetes (prediabetes)    



   >=6.5%=diabetes    



   Goals for glycemic control (ADA 2016)    



   < 7.0%Target for nonpregnant adults with diabetes.    



   More or less stringent targets may be appropriate for    



   individual patients.    



   <7.5% Target for Children and adolescents with type 1    



   diabetes.    



       









 Specimen

 

 Blood









 Performing Organization  Address  City/State/Zipcode  Phone Number

 

 [facility] DEPARTMENT OF PATHOLOGY AND  [address] [place] [phone]  



 [facility]      



Urinalysis, automated with microscopy (2018  8:30 PM CDT)





 Component  Value  Ref Range  Performed At

 

 Color, UA  Straw    [facility] DEPARTMENT OF PATHOLOGY AND



       GENOMIC MEDICINE

 

 Appearance, UA  Clear    [facility] DEPARTMENT OF PATHOLOGY AND



       GENOMIC MEDICINE

 

 Specific gravity, UA  1.006  1.001 - 1.035  [facility] DEPARTMENT OF PATHOLOGY AND



       GENOMIC MEDICINE

 

 pH, UA  6.0  5.0 - 8.5  [facility] DEPARTMENT OF PATHOLOGY AND



       GENOMIC MEDICINE

 

 Protein, UA  Negative  Negative  [facility] DEPARTMENT OF PATHOLOGY AND



       GENOMIC MEDICINE

 

 Glucose, UA  Negative  Negative  [facility] DEPARTMENT OF PATHOLOGY AND



       GENOMIC MEDICINE

 

 Ketones, UA  Negative  Negative  [facility] DEPARTMENT OF PATHOLOGY AND



       GENOMIC MEDICINE

 

 Bilirubin, UA  Negative  Negative  [facility] DEPARTMENT OF PATHOLOGY AND



       GENOMIC MEDICINE

 

 Blood, UA  Negative  Negative  [facility] DEPARTMENT OF PATHOLOGY AND



       GENOMIC MEDICINE

 

 Nitrite, UA  Negative  Negative  [facility] DEPARTMENT OF PATHOLOGY AND



       GENOMIC MEDICINE

 

 Urobilinogen, UA  <2.0  <2.0  [facility] DEPARTMENT OF PATHOLOGY AND



       GENOMIC MEDICINE

 

 Leukocyte esterase, UA  Negative  Negative  [facility] DEPARTMENT OF PATHOLOGY AND



       GENOMIC MEDICINE

 

 Epithelial cells, UA  6  /HPF  [facility] DEPARTMENT OF PATHOLOGY AND



       GENOMIC MEDICINE

 

 WBC, UA  None seen  0 - 4 /HPF  [facility] DEPARTMENT OF PATHOLOGY AND



       GENOMIC MEDICINE

 

 RBC, UA  None seen  0 - 5 /HPF  [facility] DEPARTMENT OF PATHOLOGY AND



       GENOMIC MEDICINE

 

 Bacteria, UA  None seen  None seen  [facility] DEPARTMENT OF PATHOLOGY AND



       GENOMIC MEDICINE

 

 Yeast, UA  None seen    [facility] DEPARTMENT OF PATHOLOGY AND



       GENOMIC MEDICINE

 

 Yeast with pseudohyphae, UA  None seen    [facility] DEPARTMENT OF PATHOLOGY AND



       GENOMIC MEDICINE









 Specimen

 

 Urine









 Performing Organization  Address  City/[facility]  Phone Number

 

 [facility] DEPARTMENT OF PATHOLOGY AND  [address] [place] [address]      



Prealbumin level (2018  7:45 PM CDT)





 Component  Value  Ref Range  Performed At

 

 Prealbumin  15 (L)  16 - 32 mg/dL  [facility] DEPARTMENT OF PATHOLOGY AND GENOMIC 
MEDICINE









 Specimen

 

 Serum









 Performing Organization  Address  City/[facility]  Phone Number

 

 [facility] DEPARTMENT OF PATHOLOGY AND  [address] [place] [address]      



Phosphorus level (2018  7:45 PM CDT)Only the most recent of7 
resultswithin the time period is included.





 Component  Value  Ref Range  Performed At

 

 Phosphorus  3.0  2.4 - 4.5 mg/dL  [facility] DEPARTMENT OF PATHOLOGY AND GENOMIC 
MEDICINE









 Specimen

 

 Plasma specimen









 Performing Organization  Address  City/[facility]  Phone Number

 

 [facility] DEPARTMENT OF PATHOLOGY AND  [address] [place] [address]      



Magnesium level (2018  7:45 PM CDT)Only the most recent of12 
resultswithin the time period is included.





 Component  Value  Ref Range  Performed At

 

 Magnesium  2.2  1.6 - 2.6 mg/dL  [facility] DEPARTMENT OF PATHOLOGY AND GENOMIC 
MEDICINE









 Specimen

 

 Plasma specimen









 Performing Organization  Address  City/[facility]  Phone Number

 

 [facility] DEPARTMENT OF PATHOLOGY AND  [address] [place] [address]      



Potassium level (2018  8:22 AM CDT)Only the most recent of3 resultswithin 
the time period is included.





 Component  Value  Ref Range  Performed At

 

 Potassium  3.5  3.5 - 5.0 mEq/L  [facility] DEPARTMENT OF PATHOLOGY AND GENOMIC 
MEDICINE









 Specimen

 

 Plasma specimen









 Performing Organization  Address  City/[facility]  Phone Number

 

 [facility] DEPARTMENT OF PATHOLOGY AND  [address] [place] [address]      



CBC hemogram (2018  3:25 AM CDT)Only the most recent of3 resultswithin 
the time period is included.





 Component  Value  Ref Range  Performed At

 

 WBC  5.93Comment: WBC was  4.50 - 11.00 k/uL  [facility] DEPARTMENT OF



   corrected for NRBCs    PATHOLOGY AND GENOMIC



       MEDICINE

 

 RBC  2.58 (L)  4.20 - 5.50 m/uL  [facility] DEPARTMENT OF



       PATHOLOGY AND GENOMIC



       MEDICINE

 

 HGB  8.0 (L)  12.0 - 16.0 g/dL  [facility] DEPARTMENT OF



       PATHOLOGY AND GENOMIC



       MEDICINE

 

 HCT  24.7 (L)  37.0 - 47.0 %  [facility] DEPARTMENT OF



       PATHOLOGY AND GENOMIC



       MEDICINE

 

 MCV  95.7  82.0 - 100.0 fL  [facility] DEPARTMENT OF



       PATHOLOGY AND GENOMIC



       MEDICINE

 

 MCH  31.0  27.0 - 34.0 pg  [facility] DEPARTMENT OF



       PATHOLOGY AND GENOMIC



       MEDICINE

 

 MCHC  32.4  31.0 - 37.0 g/dL  [facility] DEPARTMENT OF



       PATHOLOGY AND GENOMIC



       MEDICINE

 

 RDW - SD  60.1 (H)  37.0 - 55.0 fL  [facility] DEPARTMENT OF



       PATHOLOGY AND GENOMIC



       MEDICINE

 

 MPV  11.1  8.8 - 13.2 fL  [facility] DEPARTMENT OF



       PATHOLOGY AND GENOMIC



       MEDICINE

 

 Platelet count  147 (L)  150 - 400 k/uL  [facility] DEPARTMENT OF



       PATHOLOGY AND GENOMIC



       MEDICINE

 

 Nucleated RBC  1.00  /100 WBC  [facility] DEPARTMENT OF



       PATHOLOGY AND GENOMIC



       MEDICINE









 Performing Organization  Address  City/[facility]  Phone Number

 

 [facility] DEPARTMENT OF PATHOLOGY AND  [address] [place] [address]  



 [facility]      



Vancomycin level, trough (2018  9:26 AM CDT)





 Component  Value  Ref Range  Performed At

 

 Vancomycin, trough  18.9  10.0 - 20.0 ug/mL  [facility] DEPARTMENT OF



   Comment:    PATHOLOGY AND GENOMIC



   Therapeutic Ranges:    MEDICINE



   Peak 30.0 - 40.0 ug/mL    



   Ztgeow13.0 - 20.0 ug/mL    



       









 Specimen

 

 Serum









 Performing Organization  Address  City/[facility]  Phone Number

 

 [facility] DEPARTMENT OF PATHOLOGY AND  [address] [place] [phone]  



 [facility] MEDICINE      



ECG 12 lead (2018  3:34 PM CDT)Only the most recent of4 resultswithin the 
time period is included.





 Component  Value  Ref Range  Performed At

 

 Ventricular rate  79    [facility]

 

 Atrial rate  79    [facility]

 

 TN interval  126    [facility]

 

 QRSD interval  82    [facility]

 

 QT interval  500    [facility]

 

 QTC interval  573    [facility]

 

 P axis 1  20    [facility]

 

 QRS axis 1  34    [facility]

 

 T wave axis  25    [facility]

 

 EKG impression  Normal sinus rhythm-Possible Inferior infarct (cited on or 
before 2018)-Nonspecific T wave abnormality-Prolonged QT-Abnormal ECG-In 
automated comparison with ECG of 15-JUL-2018 14:40,-QT has dakota    [facility] MUSE



   thened-Electronically Signed By Toy Pedro MD ([id number])    



    on 2018 8:11:16 PM    



       









 Performing Organization  Address  City/[facility]  Phone Number

 

 [facility] MUSE  [address] [place] [id number]  



Troponin (2018  3:04 PM CDT)





 Component  Value  Ref Range  Performed At

 

 Troponin  <0.30  0.00 - 0.30 ng/mL  [facility] DEPARTMENT OF PATHOLOGY



   Comment:    AND GENOMIC MEDICINE



   0.30 - 1.49 ng/mlMay indicate increased risk of acute
    



    coronary syndrome.
    



   >=1.5 ng/mlConsistent with acute myocardial    



    infarction.    



   
    



   The diagnostic value of a single normal or non-diagnostic    



   result is questionable.Serial samples at 2-6 hour intervals    



   are required to rule out acute myocardial injury.    



       









 Specimen

 

 Plasma specimen









 Performing Organization  Address  City/State/Zipcode  Phone Number

 

 [facility] DEPARTMENT OF PATHOLOGY AND  [address] [place] [phone]  



 [facility]      



Vancomycin level, random (2018  5:07 PM CDT)





 Component  Value  Ref Range  Performed At

 

 Vancomycin, random  7.4  ug/mL  [facility] DEPARTMENT OF PATHOLOGY AND GENOMIC 
MEDICINE









 Specimen

 

 Serum









 Performing Organization  Address  City/[facility]/Zipcode  Phone Number

 

 [facility] DEPARTMENT OF PATHOLOGY AND  [address] [place] [phone]  



 GENOMIC MEDICINE      



Ionized calcium (2018  5:12 AM CDT)Only the most recent of2 resultswithin 
the time period is included.





 Component  Value  Ref Range  Performed At

 

 pH  7.44    [facility] DEPARTMENT OF PATHOLOGY AND GENOMIC



       MEDICINE

 

 Ionized calcium  1.06 (L)  1.11 - 1.32 mmol/L  [facility] DEPARTMENT OF PATHOLOGY AND 
GENOMIC



       MEDICINE









 Specimen

 

 Plasma specimen









 Performing Organization  Address  City/[facility]  Phone Number

 

 [facility] DEPARTMENT OF PATHOLOGY AND  [address] [place] [address]      



XR Picc Chest Portable (2018 11:18 AM CDT)





 Narrative  Performed At

 

 EXAMINATION:XR PICC CHEST PORTABLE



   [facility]



  



  



 CLINICAL HISTORY:M41.9 Scoliosisunspecified, Multiple IV meds



  



  



  



 COMPARISON:Chest x-ray 7/15/2018



  



  



  



 IMPRESSION: Single frontal view reveals interval placement of a  



 left-sided PICC line with tip overlying the SVC. The remainder of the  



 examination is unchanged.



  



  



  



  



  



  



  



 [facility]-[id number]



  



   









 Procedure Note

 

  Interface, Radiology Results Incoming - 2018 11:23 AM CDT



EXAMINATION:  XR PICC CHEST PORTABLE



 



 CLINICAL HISTORY:  M41.9 Scoliosis  unspecified, Multiple IV meds



 



 COMPARISON:  Chest x-ray 7/15/2018



 



 IMPRESSION: Single frontal view reveals interval placement of a left-sided 
PICC line with tip overlying the SVC. The remainder of the examination is 
unchanged.



 



 



 



 [facility]-[id number]









 Performing Organization  Address  City/[facility]  Phone Number

 

 [facility]  [address] [place] [address]  



PICC INSERTION (2018 10:34 AM CDT)





 Narrative  Performed At

 

 Justin Klein RN 2018 10:43 AM



  



 PICC insertion



  



 Date/Time: 2018 10:34 AM



  



 Performed by: CHARLOTTE IRBY



  



 Authorized by: PERRY PEREZ 



  



  



  



 Consent: 



  



 Consent obtained:Verbal



  



 Consent given by:Patient



  



 Risks discussed: arterial puncture, incorrect placement, nerve  



 damage, 



  



 infection, bleeding, deep vein thrombus and superficial thrombus



  



 Alternatives discussed:No treatment, alternative treatment,  



 delayed 



  



 treatment, observation and referral



  



 Universal protocol: 



  



 Procedure explained and questions answered to patient or proxy's 



  



 satisfaction: yes



  



 Relevant documents present and verified: yes



  



 Test results available and properly labeled: yes



  



 Imaging studies available: yes



  



 Required blood products, implants, devices, and special equipment 



  



 available: yes



  



 Site/side marked: yes



  



 Immediately prior to procedure, a time out was called: yes



  



 Patient identity confirmed:Verbally with patient, arm band and 



  



 hospital-assigned identification number



  



 Pre-procedure details: 



  



 Hand hygiene: Hand hygiene performed prior to insertion



  



 Sterile barrier technique: All elements of maximal sterile technique  



 



  



 followed



  



 Skin preparation:ChloraPrep



  



 Skin preparation agent: Skin preparation agent completely dried  



 prior to 



  



 procedure



  



 Anesthesia (see MAR for exact dosages): 



  



 Anesthesia method:Local infiltration



  



 Local anesthetic:Lidocaine 1% w/o epi



  



 PICC Line Placement Details (Will create an LDA): 



  



 Patient position:Flat



  



 Vessel Size (mm):3



  



 Indication:Known long term IV therapy



  



 Location:Left basilic



  



 Device Type:Non-valved



  



 Catheter size:5 Fr



  



 PICC Characteristics: 



  



 Catheter Brand:Scrapblog picc



  



 External Catheter Length (cm):0



  



 Internal Catheter Length (cm):44



  



 Total Catheter Length (cm):44



  



 Catheter Lot Number:[id number]



  



 Catheter Expiration Date:1920



  



 Procedure Details: 



  



 Landmarks identified: yes



  



 Ultrasound guidance: yes



  



 Sterile ultrasound techniques: Sterile gel and sterile probe covers  



 were 



  



 used



  



 Number of attempts:1



  



 Number of PICC kits used during procedure:1



  



 Purpose of procedure:PICC Placement



  



 Successful PICC Placement: Yes



  



 Patency/Placement:Flushes without difficulty, flushed with 10 mL  



 



  



 normal saline, x-ray placement verified, injection cap placed and  



 positive 



  



 blood return



  



 PICC placed utlizing ultrasound-guided Modified Seldinger Technique:  



 Yes 



  



  



  



 Dressing/Securement:Antimicrobial dressing dry and intact,  



 catheter 



  



 securement device and antimicrobial dressing applied



  



 Blood Loss Amount:Less than 20 mL



  



 Post-Procedure Details: 



  



 Post-procedure:Dressing applied



  



 Tip placement confirmed by chest x-ray: Yes



  



 Patient tolerance of procedure:Tolerated well, no immediate 



  



 complications  



Intraoperative monitoring (2018  7:24 AM CDT)





 Narrative  Performed At

 

 This result has an attachment that is not available.



  



Prepare RBC, 1 Units (2018  6:40 AM CDT)Only the most recent of3 
resultswithin the time period is included.





 Component  Value  Ref Range  Performed At

 

 Product name  Apheresis Red Cell AS3 #1 LR    [facility] DEPARTMENT OF



       PATHOLOGY AND GENOMIC



       MEDICINE

 

 Unit number  [id number]    [facility] DEPARTMENT OF



       PATHOLOGY AND GENOMIC



       MEDICINE

 

 Product code  [id number]    [facility] DEPARTMENT OF



       PATHOLOGY AND GENOMIC



       MEDICINE

 

 Dispense status  Transfused    [facility] DEPARTMENT OF



       PATHOLOGY AND GENOMIC



       MEDICINE

 

 Blood expiration date  [id number]    [facility] DEPARTMENT OF



       PATHOLOGY AND GENOMIC



       MEDICINE

 

 Blood type code  5100    [facility] DEPARTMENT OF



       PATHOLOGY AND GENOMIC



       MEDICINE

 

 Blood type  O POSITIVE    [facility] DEPARTMENT OF



       PATHOLOGY AND GENOMIC



       MEDICINE









 Performing Organization  Address  City/[facility]/Zipcode  Phone Number

 

 [facility] DEPARTMENT OF PATHOLOGY AND  [address] [place] [address]  



 GENOMIC MEDICINE      



Type and screen (2018  6:40 AM CDT)Only the most recent of3 resultswithin 
the time period is included.





 Component  Value  Ref Range  Performed At

 

 ABO grouping  O    [facility] DEPARTMENT OF PATHOLOGY AND GENOMIC



       MEDICINE

 

 Rh type  POS    [facility] DEPARTMENT OF PATHOLOGY AND GENOMIC



       MEDICINE

 

 Antibody screen (gel)  NEG    [facility] DEPARTMENT OF PATHOLOGY AND GENOMIC



       MEDICINE









 Specimen

 

 Blood









 Performing Organization  Address  City/[facility]/Zipcode  Phone Number

 

 [facility] DEPARTMENT OF PATHOLOGY AND  [address] [place] [address]  



 [facility]      



US Hepatic (07/15/2018  4:00 PM CDT)





 Narrative  Performed At

 

 EXAMINATION:US HEPATIC



   [facility]



  



  



 CLINICAL HISTORY:Transaminitis



  



  



  



 COMPARISON:CT chest with contrast from 7/15/2018



  



  



  



 IMPRESSION:



  



  



  



 Liver: The liver is diffusely echogenic suggesting underlying fatty  



 infiltration. No intrahepatic biliary duct dilatation. No hepatic masses  



 are seen, however, evaluation is limited due to the diffuse increased  



 echogenicity. Liver is also noted to be 



  



 enlarged measuring 19 cm in greatest craniocaudal dimension.



  



  



  



 Portal vein: The portal vein is normal in size and demonstrates normal  



 hepatopedal flow. The diameter of the portal vein measures 1.1 cm.



  



  



  



 Gallbladder: The gallbladder is not visualized. Based on prior CT chest,  



 the patient is status post cholecystectomy.



  



  



  



 Common bile duct: 0.9 cm. This is likely within normal limits given  



 patient's history of cholecystectomy.



  



  



  



 No ascites.



  



  



  



  



  



 [id number]



  



   









 Procedure Note

 

  Interface, Radiology Results Incoming - 07/15/2018  4:27 PM CDT



EXAMINATION:  US HEPATIC



 



 CLINICAL HISTORY:  Transaminitis



 



 COMPARISON:  CT chest with contrast from 7/15/2018



 



 IMPRESSION:



 



 Liver: The liver is diffusely echogenic suggesting underlying fatty 
infiltration. No intrahepatic biliary duct dilatation. No hepatic masses are 
seen, however, evaluation is limited due to the diffuse increased



 echogenicity. Liver is also noted to be



 enlarged measuring 19 cm in greatest craniocaudal dimension.



 



 Portal vein: The portal vein is normal in size and demonstrates normal 
hepatopedal flow. The diameter of the portal vein measures 1.1 cm.



 



 Gallbladder: The gallbladder is not visualized. Based on prior CT chest, the 
patient is status post cholecystectomy.



 



 Common bile duct: 0.9 cm. This is likely within normal limits given patient's 
history of cholecystectomy.



 



 No ascites.



 



 



 [id number]









 Performing Organization  Address  City/[facility]  Phone [facility]

 

 [facility]  [address] [place] [address]  



XR Abdomen 1 Vw Portable (07/15/2018 12:38 PM CDT)Only the most recent of2 
resultswithin the time period is included.





 Narrative  Performed At

 

 EXAMINATION:XR ABDOMEN 1 VW PORTABLE



   [facility]



  



  



 CLINICAL HISTORY:Vomiting



  



  



  



 COMPARISON:None.



  



  



  



 FINDINGS:



  



  



  



 Extensive postoperative changes in the spine and hips.



  



 Stimulator device overlies the right lower quadrant. A catheter overlies  



 the left flank.



  



 There is gas in the colon. No disproportionate dilatation of small bowel  



 loops is seen.



  



  



  



 IMPRESSION:



  



 As above



  



  



  



 [facility]-[id number]



  



   









 Procedure Note

 

 Hm Interface, Radiology Results Incoming - 07/15/2018 12:43 PM CDT



EXAMINATION:  XR ABDOMEN 1 VW PORTABLE



 



 CLINICAL HISTORY:  Vomiting



 



 COMPARISON:  None.



 



 FINDINGS:



 



 Extensive postoperative changes in the spine and hips.



 Stimulator device overlies the right lower quadrant. A catheter overlies the 
left flank.



 There is gas in the colon. No disproportionate dilatation of small bowel loops 
is seen.



 



 IMPRESSION:



 As above



 



 [facility]-[id number]









 Performing Organization  Address  City/State/[facility]  Phone Number

 

 [facility]  [address] [place] [address]  



Arterial blood gas (07/15/2018 10:20 AM CDT)Only the most recent of7 
resultswithin the time period is included.





 Component  Value  Ref Range  Performed At

 

 pH, arterial  7.51 (H)  7.35 - 7.45  [facility] DEPARTMENT OF PATHOLOGY AND



       GENOMIC MEDICINE

 

 pCO2, arterial  31 (L)  35 - 45 mmHg  [facility] DEPARTMENT OF PATHOLOGY AND



       GENOMIC MEDICINE

 

 pO2, arterial  163 (H)  80 - 90 mmHg  [facility] DEPARTMENT OF PATHOLOGY AND



       GENOMIC MEDICINE

 

 Bicarbonate, arterial  24.3  21.0 - 28.0 mmol/L  [facility] DEPARTMENT OF PATHOLOGY 
AND



       GENOMIC MEDICINE

 

 Base excess, arterial  2  -2 - 2 mEq/L  [facility] DEPARTMENT OF PATHOLOGY AND



       GENOMIC MEDICINE

 

 O2 saturation, arterial  100  95 - 100 %  [facility] DEPARTMENT OF PATHOLOGY AND



       GENOMIC MEDICINE









 Specimen

 

 Blood









 Performing Organization  Address  City/[facility]  Phone Number

 

 [facility] DEPARTMENT OF PATHOLOGY AND  [address] [place] [address]  



 GENOMIC MEDICINE      



Us duplex venous lower extremity (07/15/2018  8:28 AM CDT)





 Narrative  Performed At

 

  



  [facility]



 Vascular Ultrasound Laboratory



  



 Lower Extremity Venous Report



  



  [address] [facility] [address] [address]



  



  



  



 Pat.Name:GILDARDO RYAN.ID:[id number]  



  



  



 .Date: 7/15/2018 Refer.MD:PERRY PEREZ MD



  



 Exam Time: 8:02:00 AMStudy Type:LE  



 Venous 



  



 Height:67inWeight:  



 259lb 



  



 BSA: 2.26 m2  



 DOBAge:1961,57Y



  



 Sex: FEMALESonogrphr:  



 TOO Jasmine, JAVIER



  



 Pat. Stat.:Inpatient  



 Room:[place] [address] 



  



 TapeVol: SB, CPT - 4:  



 [address] 



  



 Echo Event ID:[id number]



  



 Order ID:[id number]



  



 Reason for Study:Tachypnea, evaluate for DVT. S/p stage II L2-3, L4-5



  



 lateral interbody fusion, T4-pelvis posterior fusion with



  



 instrumentation



  



 Procedures:Colorflow, Grayscale/2D, Pulsed wave Doppler



  



 Race:C 



  



 ------------------------------------



  



 SUMMARY:



  



 ------------------------------------



  



 DUPLEX SCAN OBSERVATIONS



  



  



  



  Deep VeinsSuperficial Veins



  



 RightLeft RightLeft



  



  GSV (prox) NormalNormal



  



  CFV Normal Normal (above knee)



  



  Femoral Normal Normal GSV (dist) Not Visualized Not Visualized



  



  Profunda Normal Normal (below knee)



  



  Popliteal Normal Normal



  



  PT (prox) Normal NormalSSV Not Visualized Not Visualized



  



  PT (dist) Normal Normal 



  



  Peroneal Normal Normal 



  



  



  



  RIGHT:There is normal compressibility with no evidence of echogenic



  



 material noted within the lumen of the visualized veins. Colorflow and



  



 Doppler signals are normal.



  



  



  



  LEFT: There is normal compressibility with no evidence of echogenic



  



 material noted within the lumen of the visualized veins. Colorflow and



  



 Doppler signals are normal. 



  



  



  



  PRELIMINARY FINDINGS



  



  1. No evidence of venous thrombosis in the above visualized veins. 



  



  



  



  PHYSICIAN INTERPRETATION 



  



  Venous examination of the both lower extremities demonstrated no



  



 evidence of venous thrombosis in the visualized veins.Normal



  



 compressibility and augmentation of all veins visualized.



  



  



  



 Signed 07/15/2018 01:28 PM



  



 Roger Jacinto MD, RPVI  









 Procedure Note

 

 Interface, Radiology Results In - 07/15/2018  1:28 PM CDT







                     Vascular Ultrasound Laboratory



                     Lower Extremity Venous Report



            [address] [address]



 



 Pat.Name:  GILDARDO RYAN          Pat.ID:    [id number]



 .Date:   7/15/2018               Refer.MD:  PERRY PEREZ MD



 Exam Time: 8:02:00 AM              Study Type:LE Venous



 Height:    67in                    Weight:    259lb



 BSA:       2.26 m2                   Age:  1961,57Y



 Sex:       FEMALE                  Sonogrphr: TOO Jasmine RCS



 Pat. Stat.:Inpatient               Room:      [address]  Vol: SB,                     CPT - 4:   [address]



 Echo Event ID:[id number]



 Order ID:  [id number]



 Reason for Study:Tachypnea, evaluate for DVT. S/p stage II L2-3, L4-5



 lateral interbody fusion, T4-pelvis posterior fusion with



 instrumentation



 Procedures:Colorflow, Grayscale/2D, Pulsed wave Doppler



 Race:      C



 ------------------------------------



 SUMMARY:



 ------------------------------------



 DUPLEX SCAN OBSERVATIONS



 



    Deep Veins  Superficial Veins



   Right  Left Right  Left



      GSV (prox) Normal  Normal



  CFV Normal Normal (above knee)



  Femoral Normal Normal GSV (dist) Not Visualized Not Visualized



  Profunda Normal Normal (below knee)



  Popliteal Normal Normal



  PT (prox) Normal Normal  SSV Not Visualized Not Visualized



  PT (dist) Normal Normal



  Peroneal Normal Normal



 



  RIGHT:  There is normal compressibility with no evidence of echogenic



 material noted within the lumen of the visualized veins. Colorflow and



 Doppler signals are normal.



 



  LEFT: There is normal compressibility with no evidence of echogenic



 material noted within the lumen of the visualized veins. Colorflow and



 Doppler signals are normal.



 



  PRELIMINARY FINDINGS



  1. No evidence of venous thrombosis in the above visualized veins.



 



  PHYSICIAN INTERPRETATION



  Venous examination of the both lower extremities demonstrated no



 evidence of venous thrombosis in the visualized veins.  Normal



 compressibility and augmentation of all veins visualized.



 



 Signed 07/15/2018 01:28 PM



 Roger Jacinto MD, RPVI









 Performing Organization  Address  City/State/Zipcode  Phone Number

 

  [facility]  [address] [place] [address]  



CT Angiogram Pe Chest (07/15/2018  4:52 AM CDT)





 Narrative  Performed At

 

 EXAMINATION:CT ANGIOGRAM PE CHEST



   [facility]



  



  



 CLINICAL HISTORY: PE suspectedhigh pretest prob



  



  



  



 TECHNIQUE:CT angiographic images of the chest were obtained during  



 intravenous administration of iodinated contrast. Computerized  



 reformatted images and 3-D MIP images were also obtained and archived  



 (CT pulmonary embolus protocol).CT scans are 



  



 performed using radiation dose reduction techniques.Technical  



 factors are evaluated and adjusted to ensure appropriate moderation of  



 exposure.Automated dose management technology is applied to adjust  



 radiation exposure while achieving a diagnostic 



  



 quality image.



  



  



  



  



  



 COMPARISON:None.



  



  



  



 Findings:



  



  



  



 Suboptimal bolus limits evaluation for pulmonary embolus. Evaluation for  



 subsegmental branches of the pulmonary artery are nondiagnostic. No  



 central embolus is seen.



  



  



  



 No dissection or aneurysm is seen.



  



  



  



 Right lower lobe basilar dependent atelectasis is seen.



  



  



  



 No pulmonary mass or nodule is seen.



  



  



  



 No mediastinal hematoma or lymphadenopathy is seen.



  



  



  



 Visualized upper abdomen shows no acute abnormality. Cholecystectomy  



 clips are noted.



  



  



  



 Streak artifact from the bilateral pedicular screws are noted in the  



 thoracic spine.



  



  



  



  



  



 IMPRESSION: 



  



 1. Suboptimal bolus limits evaluation for pulmonary embolus. Evaluation  



 for subsegmental branches of the pulmonary artery are nondiagnostic. No  



 central embolus is seen.



  



 2. Right lower lobe basilar dependent atelectasis.



  



 3. Otherwise no acute abnormality identified in the chest.



  



  



  



 [facility]-[id number]



  



   









 Procedure Note

 

  Interface, Radiology Results Incoming - 07/15/2018  5:03 AM CDT



EXAMINATION:  CT ANGIOGRAM PE CHEST



 



 CLINICAL HISTORY: PE suspected  high pretest prob



 



 TECHNIQUE:  CT angiographic images of the chest were obtained during 
intravenous administration of iodinated contrast. Computerized reformatted 
images and 3-D MIP images were also obtained and archived (CT pulmonary embolus 
protocol).  CT scans are



 performed using radiation dose reduction techniques.  Technical factors are 
evaluated and adjusted to ensure appropriate moderation of exposure.  Automated 
dose management technology is applied to adjust radiation



 exposure while achieving a diagnostic



 quality image.



 



 



 COMPARISON:  None.



 



 Findings:



 



 Suboptimal bolus limits evaluation for pulmonary embolus. Evaluation for 
subsegmental branches of the pulmonary artery are nondiagnostic. No central 
embolus is seen.



 



 No dissection or aneurysm is seen.



 



 Right lower lobe basilar dependent atelectasis is seen.



 



 No pulmonary mass or nodule is seen.



 



 No mediastinal hematoma or lymphadenopathy is seen.



 



 Visualized upper abdomen shows no acute abnormality. Cholecystectomy clips are 
noted.



 



 Streak artifact from the bilateral pedicular screws are noted in the thoracic 
spine.



 



 



 IMPRESSION:



 1. Suboptimal bolus limits evaluation for pulmonary embolus. Evaluation for 
subsegmental branches of the pulmonary artery are nondiagnostic. No central 
embolus is seen.



 2. Right lower lobe basilar dependent atelectasis.



 3. Otherwise no acute abnormality identified in the chest.



 



 [facility]-[id number]









 Performing Organization  Address  City/[facility]/Zipcode  Phone Number

 

 [facility]  [address] [place] [address]  



Blood culture, aerobic &amp; anaerobic (07/15/2018  2:00 AM CDT)Only the most 
recent of2 resultswithin the time period is included.





 Component  Value  Ref Range  Performed At

 

 Blood culture isolate  No growth after 5 days of incubation.    [facility] DEPARTMENT 
OF



   Comment:    PATHOLOGY AND GENOMIC



   Specimen Information    MEDICINE



   Specimen Source: Blood    



   Specimen Site: Antecubital Right    



       









 Specimen

 

 Blood









 Performing Organization  Address  City/[facility]  Phone Number

 

 [facility] DEPARTMENT OF PATHOLOGY AND  [address] [place] [id number]  



 GENOMIC MEDICINE      



Manual differential (07/15/2018 12:10 AM CDT)





 Component  Value  Ref Range  Performed At

 

 Manual differential  PERFORMED    [facility] DEPARTMENT OF PATHOLOGY AND



       GENOMIC MEDICINE

 

 Neutrophils  57.0  39.0 - 69.0 %  [facility] DEPARTMENT OF PATHOLOGY AND



       GENOMIC MEDICINE

 

 Lymphocytes  17.0 (L)  25.0 - 45.0 %  [facility] DEPARTMENT OF PATHOLOGY AND



       GENOMIC MEDICINE

 

 Monocytes  26.0 (H)  0.0 - 10.0 %  [facility] DEPARTMENT OF PATHOLOGY AND



       GENOMIC MEDICINE

 

 Eosinophils  0.0  0.0 - 5.0 %  [facility] DEPARTMENT OF PATHOLOGY AND



       GENOMIC MEDICINE

 

 Basophils  0.0  0.0 - 1.0 %  [facility] DEPARTMENT OF PATHOLOGY AND



       GENOMIC MEDICINE

 

 Metamyelocytes  0  %  [facility] DEPARTMENT OF PATHOLOGY AND



       GENOMIC MEDICINE

 

 Promyelocytes  0  %  [facility] DEPARTMENT OF PATHOLOGY AND



       GENOMIC MEDICINE

 

 Platelet slide review  Solitario slt decr    [facility] DEPARTMENT OF PATHOLOGY AND



       GENOMIC MEDICINE









 Performing Organization  Address  City/[facility]  Phone Number

 

 [facility] DEPARTMENT OF PATHOLOGY AND  [address] [place] [phone]  



 GENOMIC MEDICINE      



Hemoglobin &amp; hematocrit (2018  4:00 PM CDT)





 Component  Value  Ref Range  Performed At

 

 HGB  6.9 (LL)  12.0 - 16.0 g/dL  [facility] DEPARTMENT OF PATHOLOGY



   Comment:    AND GENOMIC MEDICINE



   HGB results called to and read back by BRUNO TABARES/[facility](name/location) at
    



   201816:18 (date/time) by PC.    



       

 

 HCT  20.4 (L)  37.0 - 47.0 %  [facility] DEPARTMENT OF PATHOLOGY



       AND GENOMIC MEDICINE









 Specimen

 

 Blood









 Performing Organization  Address  City/State/Zipcode  Phone Number

 

 [facility] DEPARTMENT OF PATHOLOGY AND  [address] [place] [address]  



 [facility] MEDICINE      



CT Head Wo Contrast (2018  9:08 PM CDT)





 Narrative  Performed At

 

 EXAMINATION:CT HEAD WO CONTRAST



   [facility]



  



  



 CT IMAGING WAS PERFORMED WITH ITERATIVE RECONSTRUCTION TECHNIQUE AND/OR  



 AUTOMATED EXPOSURE CONTROL TO REDUCE RADIATION DOSE.



  



  



  



 CLINICAL HISTORY:unable to move right arm



  



  



  



 COMPARISON:None.



  



  



  



  



  



 FINDINGS:



  



  



  



 1. There is no acute intracranial abnormality. Specifically there is  



 no intracranial hemorrhage, mass effect or acute infarction.



  



  



  



 2.There are minimal if any nonspecific cerebral white matter  



 microvascular changes. There is mild to moderate cerebral cortical  



 volume loss and mild cerebellar volume loss.



  



  



  



 3.There is a minimal atherosclerotic calcification in the distal  



 internal carotid arteries.



  



  



  



 4.There are small mucous fluid levels in the maxillary sinuses  



 bilaterally larger on the right side and in the sphenoid sinus  



 bilaterally greater on the left side. There is very mild mucosal  



 thickening/mucus in the ethmoid sinus. Mastoids are clear. 



  



 There is a 6 mm calcified wall cystic structure in the posterior  



 inferior aspect of the maxillary sinus on the right probably of a  



 dentigerous origin and of no significance.



  



  



  



  



  



 IMPRESSION:



  



  



  



 No acute abnormality demonstrated. MRI of the brain is suggested if  



 further evaluation is clinically indicated.



  



 [facility]-[id number]



  



   









 Procedure Note

 

  Interface, Radiology Results Incoming - 2018  9:15 PM CDT



EXAMINATION:  CT HEAD WO CONTRAST



 



 CT IMAGING WAS PERFORMED WITH ITERATIVE RECONSTRUCTION TECHNIQUE AND/OR 
AUTOMATED EXPOSURE CONTROL TO REDUCE RADIATION DOSE.



 



 CLINICAL HISTORY:    unable to move right arm



 



 COMPARISON:  None.



 



 



 FINDINGS:



 



 1.   There is no acute intracranial abnormality. Specifically there is no 
intracranial hemorrhage, mass effect or acute infarction.



 



 2.  There are minimal if any nonspecific cerebral white matter microvascular 
changes. There is mild to moderate cerebral cortical volume loss and mild 
cerebellar volume loss.



 



 3.  There is a minimal atherosclerotic calcification in the distal internal 
carotid arteries.



 



 4.  There are small mucous fluid levels in the maxillary sinuses bilaterally 
larger on the right side and in the sphenoid sinus bilaterally greater on the 
left side. There is very mild mucosal thickening/mucus in



 the ethmoid sinus. Mastoids are clear.



 There is a 6 mm calcified wall cystic structure in the posterior inferior 
aspect of the maxillary sinus on the right probably of a dentigerous origin and 
of no significance.



 



 



 IMPRESSION:



 



 No acute abnormality demonstrated. MRI of the brain is suggested if further 
evaluation is clinically indicated.



 [facility]-[id number]









 Performing Organization  Address  City/State/Zipcode  Phone Number

 

 [address] [address]  



Sodium level, syringe (2018  5:05 PM CDT)Only the most recent of6 
resultswithin the time period is included.





 Component  Value  Ref Range  Performed At

 

 Sodium, syringe  135  135 - 148 mEq/L  [facility] DEPARTMENT OF PATHOLOGY AND GENOMIC



       MEDICINE









 Specimen

 

 Blood









 Performing Organization  Address  City/[facility]  Phone Number

 

 [facility] DEPARTMENT OF PATHOLOGY AND  [address] [place] [address] MEDICINE      



Potassium, syringe (2018  5:05 PM CDT)Only the most recent of6 
resultswithin the time period is included.





 Component  Value  Ref Range  Performed At

 

 Potassium, syringe  3.8  3.5 - 5.0 mEq/L  [facility] DEPARTMENT OF PATHOLOGY AND 
GENOMIC



       MEDICINE









 Specimen

 

 Blood









 Performing Organization  Address  City/[facility]  Phone Number

 

 [facility] DEPARTMENT OF PATHOLOGY AND  [address] [place] [address] MEDICINE      



Lactic acid, syringe (2018  5:05 PM CDT)Only the most recent of6 
resultswithin the time period is included.





 Component  Value  Ref Range  Performed At

 

 Lactic acid, syringe  1.3  0.5 - 2.2 mmol/L  [facility] DEPARTMENT OF PATHOLOGY AND 
GENOMIC



       MEDICINE









 Specimen

 

 Blood









 Performing Organization  Address  City/[facility]/Zipcode  Phone Number

 

 [facility] DEPARTMENT OF PATHOLOGY AND  [address] [place] [address]      



Ionized calcium, arterial (2018  5:05 PM CDT)Only the most recent of6 
resultswithin the time period is included.





 Component  Value  Ref Range  Performed At

 

 Ionized calcium,  0.93 (L)Comment:  1.11 - 1.32 mmol/L  [facility] DEPARTMENT OF



 arterial  Specimen is slightly    PATHOLOGY AND GENOMIC



   hemolyzed.  Interpret    MEDICINE



   results accordingly.    









 Specimen

 

 Blood









 Performing Organization  Address  City/[facility]  Phone Number

 

 [facility] DEPARTMENT OF PATHOLOGY AND  [address] [place] [address]      



Hemoglobin, syringe (2018  5:05 PM CDT)Only the most recent of6 
resultswithin the time period is included.





 Component  Value  Ref Range  Performed At

 

 Hemoglobin, syringe  9.2 (L)  12.0 - 16.0 g/dL  [facility] DEPARTMENT OF PATHOLOGY 
AND GENOMIC



       MEDICINE









 Specimen

 

 Blood









 Performing Organization  Address  City/[facility]  Phone Number

 

 [facility] DEPARTMENT OF PATHOLOGY AND  [address] [place] [address]      



Glucose level, syringe (2018  5:05 PM CDT)Only the most recent of6 
resultswithin the time period is included.





 Component  Value  Ref Range  Performed At

 

 Glucose, syringe  193 (H)  65 - 99 mg/dL  [facility] DEPARTMENT OF PATHOLOGY [facility] 
GENOMIC



       MEDICINE









 Specimen

 

 Blood









 Performing Organization  Address  City/State/[facility]  Phone Number

 

 [facility] DEPARTMENT OF PATHOLOGY AND  [address] [place] [address]      



Arterial blood gas, corrected (2018  5:05 PM CDT)Only the most recent of6 
resultswithin the time period is included.





 Component  Value  Ref Range  Performed At

 

 pH, arterial  7.35  7.35 - 7.45  [facility] DEPARTMENT OF PATHOLOGY AND GENOMIC



       MEDICINE

 

 pCO2, arterial  40  35 - 45 mmHg  [facility] DEPARTMENT OF PATHOLOGY AND GENOMIC



       MEDICINE

 

 pO2, arterial  189 (H)  80 - 90 mmHg  [facility] DEPARTMENT OF PATHOLOGY AND GENOMIC



       MEDICINE

 

 Temperature, Celsius  37.0  Degrees C  [facility] DEPARTMENT OF PATHOLOGY AND GENOMIC



       MEDICINE

 

 O2 saturation, arterial  100  95 - 100 %  [facility] DEPARTMENT OF PATHOLOGY AND 
GENOMIC



       MEDICINE

 

 pH, arterial corrected  7.35    [facility] DEPARTMENT OF PATHOLOGY AND GENOMIC



       MEDICINE

 

 pCO2, arterial corrected  40  mmHg  [facility] DEPARTMENT OF PATHOLOGY AND GENOMIC



       MEDICINE

 

 pO2, arterial corrected  189  mmHg  [facility] DEPARTMENT OF PATHOLOGY AND GENOMIC



       MEDICINE

 

 Base excess, arterial  -4 (L)  -2 - 2 mEq/L  [facility] DEPARTMENT OF PATHOLOGY AND 
GENOMIC



       MEDICINE









 Specimen

 

 Blood









 Performing Organization  Address  City/[facility]  Phone Number

 

 [facility] DEPARTMENT OF PATHOLOGY AND  [address] [place] [address] MEDICINE      



OR FL &gt; I Hour (2018  4:41 PM CDT)Only the most recent of3 
resultswithin the time period is included.





 Narrative  Performed At

 

 EXAMINATION:OR FL 1 HOUR



  [facility]



  



  



 C-arm fluoroscopy was requested in OR.



  



  



  



 Location: [facility] OR room#19



  



 O-ARM



  



 Scans:4



  



 Procedure:L2-L3, L4-L5 LATERAL INTERBODY FUSION W/ INSTRUMENTATION;  



 T4-PELVIS POSTERIOR FUSION W/ INSTRUMENTATION



  



 Start:7:32pm



  



 End:4:41pm



  



 Fluoro Time:12.17sec



  



 Dose(mGy):157.22



  



 Tech:QTN/MA



  



 Date:18



  



  



  



 IMPRESSION:



  



  



  



 Separate operative report will be issued by the physician performing the  



 procedure.



  



  



  



  



  



  



  



 [id number]



  



   









 Procedure Note

 

  Interface, Radiology Results Incoming - 2018 10:11 PM CDT



EXAMINATION:  OR FL   1 HOUR



 



 C-arm fluoroscopy was requested in OR.



 



 Location: [facility] OR room#19



 O-ARM



 Scans:4



 Procedure:L2-L3, L4-L5 LATERAL INTERBODY FUSION W/ INSTRUMENTATION; T4-PELVIS 
POSTERIOR FUSION W/ INSTRUMENTATION



 Start:7:32pm



 End:4:41pm



 Fluoro Time:12.17sec



 Dose(mGy):157.22



 Tech:QTN/MA



 Date:18



 



 IMPRESSION:



 



 Separate operative report will be issued by the physician performing the 
procedure.



 



 



 



 [id number]









 Performing Organization  Address  City/State/Zipcode  Phone Number

 

  [facility]  [address] [place] [address]  



XR Lumbar Spine 1 Vw (2018  4:31 PM CDT)





 Narrative  Performed At

 

 EXAMINATION: XR LUMBAR SPINE 1 VW



   [facility]



  



  



 CLINICAL HISTORY: Lumbar region back pain and radiculopathy



  



  



  



 COMPARISON:None



  



  



  



 Findings:



  



  



  



 There is posterior fusion from the upper sacral region into the thoracic  



 region. The upper extent is not seen on this exam. There is multilevel  



 pedicle screws connected by vertical rods with areas of laminectomy.  



 There is anterior fusion material at 



  



 L5-S1, L4-5 and L2-3. There are degenerative changes in the lumbar and  



 lower thoracic spine. There is increased lumbar lordosis. There are  



 multiple radiopaque wires and instruments.



  



  



  



 IMPRESSION:



  



  



  



 Lateral intraoperative radiograph of the lumbar spine, sacrum and lower  



 thoracic spine for localization during surgery.



  



  



  



  



  



  



  



  



  



  



  



  



  



  



  



 [id number]



  



   









 Procedure Note

 

  Interface, Radiology Results Incoming - 2018  4:40 PM CDT



EXAMINATION: XR LUMBAR SPINE 1 VW



 



 CLINICAL HISTORY: Lumbar region back pain and radiculopathy



 



 COMPARISON:  None



 



 Findings:



 



 There is posterior fusion from the upper sacral region into the thoracic 
region. The upper extent is not seen on this exam. There is multilevel pedicle 
screws connected by vertical rods with areas of laminectomy. There is anterior 
fusion material at



 L5-S1, L4-5 and L2-3. There are degenerative changes in the lumbar and lower 
thoracic spine. There is increased lumbar lordosis. There are multiple 
radiopaque wires and instruments.



 



 IMPRESSION:



 



 Lateral intraoperative radiograph of the lumbar spine, sacrum and lower 
thoracic spine for localization during surgery.



 



 



 



 



 



 



 



 [facility]-[id number]









 Performing Organization  Address  City/State/Zipcode  Phone Number

 

  [facility]  [address] [place] [address]  



XR Cervical Spine 1 Vw (2018  4:31 PM CDT)





 Narrative  Performed At

 

 EXAMINATION: XR CERVICAL SPINE 1 VW



   [facility]



  



  



 CLINICAL HISTORY: Cervical region neck pain and radiculopathy



  



  



  



 COMPARISON:None



  



  



  



 Findings:



  



  



  



 Single AP intraoperative x-ray shows an endotracheal tube with the tip  



 at the level of the clavicles. There is a tube with a radiopaque linear  



 density just to the left of this region. There is a nonspecific  



 radiopaque density foreign body overlapping the



  



  right posterior medial T3 rib. There is posterior fusion from the  



 approximately the T4 level extending downwards. Overlying structures  



 obscure detail of the upper thoracic spine. The inferior extent is not  



 seen on this exam. There is anterior fusion 



  



 with plate and screws in the lower cervical region.



  



  



  



  



  



  



  



 IMPRESSION:



  



  



  



 AP intraoperative radiograph of the cervical and upper thoracic spine  



 for localization during surgery.



  



  



  



  



  



  



  



  



  



  



  



  



  



  



  



 [facility]-[id number]



  



   









 Procedure Note

 

  Interface, Radiology Results Incoming - 2018  4:38 PM CDT



EXAMINATION: XR CERVICAL SPINE 1 VW



 



 CLINICAL HISTORY: Cervical region neck pain and radiculopathy



 



 COMPARISON:  None



 



 Findings:



 



 Single AP intraoperative x-ray shows an endotracheal tube with the tip at the 
level of the clavicles. There is a tube with a radiopaque linear density just 
to the left of this region. There is a nonspecific radiopaque



 density foreign body overlapping the



  right posterior medial T3 rib. There is posterior fusion from the 
approximately the T4 level extending downwards. Overlying structures obscure 
detail of the upper thoracic spine. The inferior extent is not



 seen on this exam. There is anterior fusion



 with plate and screws in the lower cervical region.



 



 



 



 IMPRESSION:



 



 AP intraoperative radiograph of the cervical and upper thoracic spine for 
localization during surgery.



 



 



 



 



 



 



 



 [facility]-[id number]









 Performing Organization  Address  City/[facility]/Zipcode  Phone Number

 

 [facility]  [address] [place] [address]  



Surgical pathology request (2018  7:41 AM CDT)Only the most recent of2 
resultswithin the time period is included.





 Component  Value  Ref Range  Performed At

 

 Case number  [id number]    [facility] DEPARTMENT OF



       PATHOLOGY AND GENOMIC



       MEDICINE

 

 Surgical pathology report  See link below for PDF    [facility] DEPARTMENT OF



   Lab Report    PATHOLOGY AND GENOMIC



       MEDICINE

 

 Result status  This is Final Report to    [facility] DEPARTMENT OF



   [id number]    PATHOLOGY AND GENOMIC



       MEDICINE









 Performing Organization  Address  City/[facility]  Phone Number

 

 [facility] DEPARTMENT OF PATHOLOGY AND  [address] [place] [address]  



 GENOMIC [facility]      



Partial thromboplastin time, activated (2018  4:28 AM CDT)Only the most 
recent of3 resultswithin the time period is included.





 Component  Value  Ref Range  Performed At

 

 PTT  35.9  23.0 - 36.0 sec  [facility] DEPARTMENT OF PATHOLOGY



   Comment:    AND GENOMIC [facility]



   PTT therapeutic range for unfractionated heparin is    



   61.0-112.0 seconds which corresponds to Anti-Xa    



   0.3-0.7 U/ml.    



       









 Specimen

 

 Blood









 Performing Organization  Address  City/State/[facility]  Phone Number

 

 [facility] DEPARTMENT OF PATHOLOGY AND  [address] [place] [address]      



Prothrombin time with INR (2018  4:28 AM CDT)Only the most recent of3 
resultswithin the time period is included.





 Component  Value  Ref Range  Performed At

 

 Prothrombin time  13.8  12.0 - 15.0 sec  [facility] DEPARTMENT OF



       PATHOLOGY AND GENOMIC



       MEDICINE

 

 INR  1.0    [facility] DEPARTMENT OF



   Comment:    PATHOLOGY AND GENOMIC



   The International Normalized Ratio (INR) is a therapeutic    MEDICINE



   monitoring tool for patients who are stable on oral    



   anticoagulant therapy. An INR of 2.0-3.0 is suggested for deep    



   vein thrombosis/pulmonary embolism.    



       









 Specimen

 

 Blood









 Performing Organization  Address  City/[facility]/Zipcode  Phone Number

 

 [facility] DEPARTMENT OF PATHOLOGY AND  [address] [place] [address]      



Hemoglobin (2018  4:00 AM CDT)





 Component  Value  Ref Range  Performed At

 

 HGB  10.3 (L)  12.0 - 16.0 g/dL  [facility] DEPARTMENT OF PATHOLOGY AND GENOMIC 
MEDICINE









 Specimen

 

 Blood









 Performing Organization  Address  City/[facility]/Zipcode  Phone Number

 

 [facility] DEPARTMENT OF PATHOLOGY AND  [address] [place] [address] [place] [address]  



 [facility]      



CT Thoracic Spine Wo Contrast (07/10/2018  5:00 PM CDT)





 Narrative  Performed At

 

 Study: CT THORACIC SPINE WO CONTRAST.



   [facility]



  



  



 HISTORY: surgical planning.



  



  



  



 COMPARISON:None.



  



  



  



 TECHNIQUE: Multiple axial CT images of the thoracic spine performed  



 without intravenous contrast. Sagittal and coronal reconstructions  



 performed.



  



  



  



 CT imaging was performed with iterative reconstruction technique and/or  



 automated exposure control to reduce radiation dose.



  



  



  



 FINDINGS:



  



  



  



 Mineralization is low normal. There is a curvature of the thoracic spine  



 left convex at the level of T4 and right convex at the level of T9. No  



 lateral listhesis. There is been prior posterior element surgery from T5  



 to T12. There is fusion of facet 



  



 joints bilaterally at these levels. No acute fracture or erosions. There  



 is generalized mild disc space loss. No obvious disc protrusion present.  



 There is no significant canal or foraminal stenosis within limitations  



 of CT. The tip of the epidural 



  



 electrode is seen posteriorly at the level of T11.



  



  



  



 Paravertebral soft tissues are unremarkable. There is some mild  



 atelectasis is of the dependent portions of the lungs. There is some  



 pleural calcification on the right.



  



  



  



 IMPRESSION:



  



  



  



 S-shaped scoliosis of the thoracic spine with some prior posterior  



 element surgery at mid to lower segments and fusion of the facet joints  



 bilaterally at these levels.



  



  



  



  



  



  



  



 [facility]-[id number]



  



   









 Procedure Note

 

  Interface, Radiology Results [facility] - 07/10/2018  5:44 PM CDT



Study: CT THORACIC SPINE WO CONTRAST.



 



 HISTORY: surgical planning.



 



 COMPARISON:None.



 



 TECHNIQUE: Multiple axial CT images of the thoracic spine performed without 
intravenous contrast. Sagittal and coronal reconstructions performed.



 



 CT imaging was performed with iterative reconstruction technique and/or 
automated exposure control to reduce radiation dose.



 



 FINDINGS:



 



 Mineralization is low normal. There is a curvature of the thoracic spine left 
convex at the level of T4 and right convex at the level of T9. No lateral 
listhesis. There is been prior posterior element surgery from T5 to T12. There 
is fusion of facet



 joints bilaterally at these levels. No acute fracture or erosions. There is 
generalized mild disc space loss. No obvious disc protrusion present. There is 
no significant canal or foraminal stenosis within limitations of CT. The tip of 
the epidural



 electrode is seen posteriorly at the level of T11.



 



 Paravertebral soft tissues are unremarkable. There is some mild atelectasis is 
of the dependent portions of the lungs. There is some pleural calcification on 
the right.



 



 IMPRESSION:



 



 S-shaped scoliosis of the thoracic spine with some prior posterior element 
surgery at mid to lower segments and fusion of the facet joints bilaterally at 
these levels.



 



 



 



 [facility]-[id number]









 Performing Organization  Address  City/State/Zipcode  Phone Number

 

  [facility]  [address] [place] [address]  



CT Lumbar Spine Wo Contrast (07/10/2018  4:59 PM CDT)





 Narrative  Performed At

 

 Study: CT LUMBAR SPINE WO CONTRAST.



   [facility]



  



  



 HISTORY: surgical planning.



  



  



  



 COMPARISON:None.



  



  



  



 TECHNIQUE: Multiple axial CT images of the lumbar spine performed  



 without intravenous contrast. Sagittal and coronal reconstructions  



 performed.



  



  



  



 CT imaging was performed with iterative reconstruction technique and/or  



 automated exposure control to reduce radiation dose.



  



  



  



 FINDINGS:



  



  



  



 Mineralization is low normal. There is a left convex curvature of the  



 lumbar spine centered at L3. No lateral listhesis. No anterior  



 subluxations. Vertebral body heights are within normal limits. There is  



 no acute fracture. There are some postoperative 



  



 changes of prior laminectomy at L1. The spinal canal electrode appears  



 more intrathecal in the lumbar spine when compared to the recent  



 thoracic spine. There are posterior changes of L5/S1 interbody fusion.  



 No erosions.



  



  



  



 Mild disc space loss at L1-2 without disc protrusion, canal stenosis,  



 foraminal stenosis.



  



 Severe disc space loss at L2-3 with a broad shallow disc protrusion. No  



 significant canal stenosis. Moderate bilateral foraminal stenosis facet  



 arthrosis.



  



 Severe disc space loss at L3-4 without obvious disc protrusion. No  



 significant canal stenosis. Facet arthrosis result in mild left and  



 moderate right foraminal stenosis.



  



 Moderate disc space loss at L4-5. Broad shallow disc bulge and mild  



 canal stenosis. Facet arthrosis present with moderate bilateral  



 foraminal stenosis.



  



 Postoperative changes at L5/S1 as described above. There is no canal  



 stenosis. Moderate left foraminal stenosis from facet arthrosis. Right  



 foramen is patent.



  



  



  



 Paravertebral soft tissues are unremarkable. No fluid collection. Small  



 foci of air in the retroperitoneum likely postoperative.



  



  



  



  



  



 IMPRESSION:



  



  



  



 Expected postoperative changes.



  



  



  



 Degenerative changes as above.



  



  



  



 [facility]-[id number]



  



   









 Procedure Note

 

  Interface, Radiology Results Incoming - 07/10/2018  5:52 PM CDT



Study: CT LUMBAR SPINE WO CONTRAST.



 



 HISTORY: surgical planning.



 



 COMPARISON:None.



 



 TECHNIQUE: Multiple axial CT images of the lumbar spine performed without 
intravenous contrast. Sagittal and coronal reconstructions performed.



 



 CT imaging was performed with iterative reconstruction technique and/or 
automated exposure control to reduce radiation dose.



 



 FINDINGS:



 



 Mineralization is low normal. There is a left convex curvature of the lumbar 
spine centered at L3. No lateral listhesis. No anterior subluxations. Vertebral 
body heights are within normal limits. There is no



 acute fracture. There are some postoperative



 changes of prior laminectomy at L1. The spinal canal electrode appears more 
intrathecal in the lumbar spine when compared to the recent thoracic spine. 
There are posterior changes of L5/S1 interbody fusion. No erosions.



 



 Mild disc space loss at L1-2 without disc protrusion, canal stenosis, 
foraminal stenosis.



 Severe disc space loss at L2-3 with a broad shallow disc protrusion. No 
significant canal stenosis. Moderate bilateral foraminal stenosis facet 
arthrosis.



 Severe disc space loss at L3-4 without obvious disc protrusion. No significant 
canal stenosis. Facet arthrosis result in mild left and moderate right 
foraminal stenosis.



 Moderate disc space loss at L4-5. Broad shallow disc bulge and mild canal 
stenosis. Facet arthrosis present with moderate bilateral foraminal stenosis.



 Postoperative changes at L5/S1 as described above. There is no canal stenosis. 
Moderate left foraminal stenosis from facet arthrosis. Right foramen is patent.



 



 Paravertebral soft tissues are unremarkable. No fluid collection. Small foci 
of air in the retroperitoneum likely postoperative.



 



 



 IMPRESSION:



 



 Expected postoperative changes.



 



 Degenerative changes as above.



 



 [facility]-[id number]









 Performing Organization  Address  City/[facility]/Zipcode  Phone Number

 

 [facility]  [address] [place] [address]  



Lactic acid level (07/10/2018  3:00 PM CDT)





 Component  Value  Ref Range  Performed At

 

 Lactic acid  3.9 (HH)  0.5 - 2.2 mmol/L  [facility] DEPARTMENT OF PATHOLOGY AND 
GENOMIC



       MEDICINE









 Specimen

 

 Blood









 Performing Organization  Address  City/State/Zipcode  Phone Number

 

 [facility] DEPARTMENT OF PATHOLOGY AND  [address] [place] [address]  



 GENOMIC MEDICINE      



XR Chest 2 Vw (2018  5:24 PM CDT)





 Narrative  Performed At

 

 Examination: XR CHEST 2 VW



   [facility]



  



  



 Clinical history: Z01.818 Encounter for other preprocedural examination,  



 PREOP



  



  



  



 Comparison: None



  



  



  



 Impression:



  



  



  



  



  



 1. The heart and pulmonary vasculature are within normal limits.



  



 2. No infiltrate or effusion is demonstrated. Right middle lobe linear  



 atelectasis is likely chronic.



  



 3. There is no acute osseous pathology. Thoracolumbar scoliosis distorts  



 the mediastinal anatomy.



  



  



  



  



  



 CONCLUSION: NO RADIOGRAPHIC EVIDENCE OF ACUTE CARDIOPULMONARY  



 ABNORMALITY.



  



 



  



  



  



  



  



  



  



  



  



  



  



 [facility]-[id number]



  



   









 Procedure Note

 

 Hm Interface, Radiology Results Incoming - 2018  5:50 PM CDT



Examination: XR CHEST 2 VW



 



 Clinical history: Z01.818 Encounter for other preprocedural examination, PREOP



 



 Comparison: None



 



 Impression:



 



 



 1. The heart and pulmonary vasculature are within normal limits.



 2. No infiltrate or effusion is demonstrated. Right middle lobe linear 
atelectasis is likely chronic.



 3. There is no acute osseous pathology. Thoracolumbar scoliosis distorts the 
mediastinal anatomy.



 



 



 CONCLUSION: NO RADIOGRAPHIC EVIDENCE OF ACUTE CARDIOPULMONARY ABNORMALITY.



 



 



 



 



 



 



 [facility]-[id number]









 Performing Organization  Address  City/[facility]/Zipcode  Phone Number

 

  [facility]  [address] [place] [address]  



Vitamin D 25 hydroxy level (2018  4:21 PM CDT)





 Component  Value  Ref Range  Performed At

 

 Vitamin D, 25-hydroxy  42.7  30.0 - 150.0  [facility] DEPARTMENT OF



   Comment:  ng/mL  PATHOLOGY AND GENOMIC



   This assay reports the sum of 25-hydroxy vitamin D3 and 25-hydroxy vitamin  
  MEDICINE



   D2.    



   Reference range:    



   0-17 years:    



   Deficiency: less than 20ng/mL    



   Optimum level: greater than or equal to 20 ng/mL.    



   18 years and older:    



   Deficiency: less than 20ng/mL    



   Insufficiency: 20-29 ng/mL    



   Optimum Level: 30-80 ng/mL    



   The assay reportable range is 3.4155.9 ng/mL. Levels higher than 150    



   ng/mL may be associated with toxicity.    



   If toxicity is clinically suspected and the reported result is >155.9    



   ng/mL,contact lab for alternative methods to obtain a definitivelevel.  
  



   If separate quantitation of 25-hydroxy vitamin D3 and 25-hydroxy vitamin D2 
   



   is needed, please contact lab for alternative methods.    



       









 Specimen

 

 Blood









 Performing Organization  Address  City/State/Zipcode  Phone Number

 

 [facility] DEPARTMENT OF PATHOLOGY AND  [address] [place] [address]  



 GENOMIC MEDICINE      



Parathyroid hormone (2018  4:21 PM CDT)





 Component  Value  Ref Range  Performed At

 

 PTH  55  15 - 65 pg/mL  [facility] DEPARTMENT OF PATHOLOGY AND GENOMIC MEDICINE









 Specimen

 

 Blood









 Performing Organization  Address  City/[facility]/Zipcode  Phone Number

 

 [facility] DEPARTMENT OF PATHOLOGY AND  [address] [place] [address]  



 [place]      



Hepatic function panel (2018  4:21 PM CDT)





 Component  Value  Ref Range  Performed At

 

 Albumin  3.6  3.5 - 5.0 g/dL  [facility] DEPARTMENT OF



       PATHOLOGY AND GENOMIC



       MEDICINE

 

 Total bilirubin  <0.2  0.0 - 1.2 mg/dL  [facility] DEPARTMENT OF



       PATHOLOGY AND GENOMIC



       MEDICINE

 

 Bilirubin direct  <0.2  0.0 - 0.3 mg/dL  [facility] DEPARTMENT OF



       PATHOLOGY AND GENOMIC



       MEDICINE

 

 Alkaline phosphatase  91  35 - 104 U/L  [facility] DEPARTMENT OF



       PATHOLOGY AND GENOMIC



       MEDICINE

 

 Protein  7.8  6.3 - 8.3 g/dL  [facility] DEPARTMENT OF



   Comment:    PATHOLOGY AND GENOMIC



   [place] 4.6-7.0 g/dL    MEDICINE



   1 week 4.4-7.6 g/dL    



   7 months-1year5.1-7.3 g/dL    



   1-2 years5.6-7.5 g/dL    



   >3 years6.0-8.0 g/dL    



    6.3-8.3 g/dL    



       

 

 ALT  50  5 - 50 U/L  [facility] DEPARTMENT OF



       PATHOLOGY AND GENOMIC



       MEDICINE

 

 AST  31  10 - 35 U/L  [facility] DEPARTMENT OF



       PATHOLOGY AND GENOMIC



       MEDICINE









 Specimen

 

 Plasma specimen









 Performing Organization  Address  City/State/Zipcode  Phone Number

 

 [facility] DEPARTMENT OF PATHOLOGY AND  [address] [place] [address]  



 [facility]      



Bone Density Peripheral (2018  5:25 PM CDT)





 Narrative  Performed At

 

 EXAMINATION:BONE DENSITY PERIPHERAL



   [facility]



  



  



 CLINICAL HISTORY:M80.00XA Age-related osteoporosis with current  



 pathological fracture 



  



  



  



 COMPARISON:None.



  



  



  



 The results of this study expressed as bone mineral density (BMD) were  



 as follows:



  



  



  



  



  



 Left Forearm (Radius 33%):



  



 BMD: 0.847 g/cm2



  



 T-Score: -0.3



  



 Z-Score: 0.3 



  



 Percent change: No prior exam. %



  



  



  



  



  



 Impression:Normal BMD.



  



  



  



  



  



 [facility]-[id number]



  



  



  



 A copy of this scans including a report detailing these results will  



 follow.



  



  



  



 Notes: 



  



 *The world health organization (WHO) has classified the patient's  



 T-score as follows:



  



  



  



 Normal=T-score at or above -1.0 SD



  



 Osteopenia=T-score between -1.0 and -2.5 SD



  



 Osteoporosis=T-score at or below minus 2.5 SD 



  



  



  



 **For premenopausal women, men under the age 50 years, and children the  



 WHO classification does not apply. In these individuals please assess  



 bone mineral density with Z scores for each skeletal site examined. 



  



  



  



 Z scores above -2.0: Within expected range for age. 



  



 Z scores lower than -2.0:Low bone density for age.



  



   









 Procedure Note

 

  Interface, Radiology Results Incoming - 2018  5:55 PM CDT



EXAMINATION:  BONE DENSITY PERIPHERAL



 



 CLINICAL HISTORY:  M80.00XA Age-related osteoporosis with current pathological 
fracture



 



 COMPARISON:  None.



 



 The results of this study expressed as bone mineral density (BMD) were as 
follows:



 



 



 Left Forearm (Radius 33%):



 BMD: 0.847 g/cm2



 T-Score: -0.3



 Z-Score: 0.3



 Percent change: No prior exam. %



 



 



 Impression:  Normal BMD.



 



 



 [facility]-[id number]



 



 A copy of this scans including a report detailing these results will follow.



 



 Notes:



 *The world health organization (WHO) has classified the patient's T-score as 
follows:



 



 Normal=T-score at or above -1.0 SD



 Osteopenia=T-score between -1.0 and -2.5 SD



 Osteoporosis=T-score at or below minus 2.5 SD



 



 **For premenopausal women, men under the age 50 years, and children the WHO 
classification does not apply. In these individuals please assess bone mineral 
density with Z scores for each skeletal site examined.



 



 Z scores above -2.0: Within expected range for age.



 Z scores lower than -2.0:  Low bone density for age.









 Performing Organization  Address  City/State/Zipcode  Phone Number

 

 [facility]  [address] [place] [address]  



Bone Density (2018  5:25 PM CDT)





 Narrative  Performed At

 

 EXAMINATION:BONE DENSITY



   [facility]



  



  



 CLINICAL HISTORY:M80.00XA Age-related osteoporosis with current  



 pathological fracture 



  



  



  



 COMPARISON:None.



  



  



  



 The results of this study expressed as bone mineral density (BMD) were  



 as follows:



  



  



  



 AP spine (L1-L4)



  



 BMD: 1.462 g/cm2



  



 T-Score: 2.4



  



 Z-Score: 2.1



  



 Percent change: No prior exam.%



  



 ***Values are likely spuriously elevated secondary to multilevel  



 degenerative disc disease and reactive vertebral body osteosclerosis  



 along the concave margin of a levoscoliotic spine.



  



  



  



  



  



 Dual Femur (Total Mean):



  



 ***Limited by bilateral total arthroplasties.



  



  



  



  



  



 Impression:Limited examination secondary to degenerative discogenic  



 osteosclerosis of the AP lumbar spine and bilateral total hip  



 arthroplasties.



  



  



  



  



  



 Notes: 



  



 *The world health organization (WHO) has classified the patient's  



 T-score as follows:



  



  



  



 Normal=T-score at or above -1.0 SD



  



 Osteopenia=T-score between -1.0 and -2.5 SD



  



 Osteoporosis=T-score at or below minus 2.5 SD 



  



  



  



 **For premenopausal women, men under the age 50 years, and children the  



 WHO classification does not apply. In these individuals please assess  



 bone mineral density with Z scores for each skeletal site examined. 



  



  



  



 Z scores above -2.0: Within expected range for age. 



  



 Z scores lower than -2.0:Low bone density for age.



  



  



  



 ***The TBS is derived from the texture of the DEXA image and has been  



 shown to be related to bone microarchitecture and fracture risk. This  



 data provides information independent of BMD value; is used as a  



 complement to the data obtained from the DEXA 



  



 analysis and the clinical examination. The TBS can assist the healthcare  



 professional in assessment of fracture risk and in monitoring the effect  



 of treatments on patient over time.



  



  



  



 [facility]-[id number]



  



   









 Procedure Note

 

 [facility], Radiology Results Incoming - 2018  5:52 PM CDT



EXAMINATION:  BONE DENSITY



 



 CLINICAL HISTORY:  M80.00XA Age-related osteoporosis with current pathological 
fracture



 



 COMPARISON:  None.



 



 The results of this study expressed as bone mineral density (BMD) were as 
follows:



 



 AP spine (L1-L4)



 BMD: 1.462 g/cm2



 T-Score: 2.4



 Z-Score: 2.1



 Percent change: No prior exam.%



 ***Values are likely spuriously elevated secondary to multilevel degenerative 
disc disease and reactive vertebral body osteosclerosis along the concave 
margin of a levoscoliotic spine.



 



 



 Dual Femur (Total Mean):



 ***Limited by bilateral total arthroplasties.



 



 



 Impression:  Limited examination secondary to degenerative discogenic 
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



 



 



 Notes:



 *The world health organization (WHO) has classified the patient's T-score as 
follows:



 



 Normal=T-score at or above -1.0 SD



 Osteopenia=T-score between -1.0 and -2.5 SD



 Osteoporosis=T-score at or below minus 2.5 SD



 



 **For premenopausal women, men under the age 50 years, and children the WHO 
classification does not apply. In these individuals please assess bone mineral 
density with Z scores for each skeletal site examined.



 



 Z scores above -2.0: Within expected range for age.



 Z scores lower than -2.0:  Low bone density for age.



 



 ***The TBS is derived from the texture of the DEXA image and has been shown to 
be related to bone microarchitecture and fracture risk. This data provides 
information independent of BMD value; is used as a complement to



 the data obtained from the DEXA



 analysis and the clinical examination. The TBS can assist the healthcare 
professional in assessment of fracture risk and in monitoring the effect of 
treatments on patient over time.



 



 [facility]-[id number]









 Performing Organization  Address  City/State/Zipcode  Phone Number

 

  [facility]  [address] [place] [address]  



after 2018



Insurance







 Payer  Benefit Plan / Group  Subscriber ID  Type  Phone  Address

 

 [employer]  xxxxxxxxxxx  O    









 Guarantor Name  Account Type  Relation to  Date of Birth  Phone  Billing



     Patient      Address

 

 Gildardo Ryan  Personal/Family  Self  1961  [phone]  [phone]







         (Home)  [place] [address]







Advance Directives

Patient has advance care planning documents on file. For more information, 
please contact:[address] [place] [address]

## 2019-04-24 NOTE — XMS REPORT
Continuity of Care Document

 Created on:2018



Patient:GILDARDO RYAN

Sex:Female

:1961

External Reference #:[id number]





Demographics







 Address  [address] [place] [id number]

 

 Phone  [phone]

 

 Preferred Language  Unknown

 

 Marital Status  Unknown

 

 Muslim Affiliation  Unknown

 

 Race  Unknown

 

 Ethnic Group  Unknown









Author







 Organization  [facility]









Problems







 Problem  Status  Onset  Classification  Date  Comments  Source



     Date    Reported    



             



             



             

 

 BACK  Active           [facility]



     018        [facility]



             



             

 

 BACK / AQUA  Active          [facility]



     018        [facility]



             



             

 

 ARTHROPATHY OF LUMBAR  Active          [facility]



 FACET JOINTS    017        [place]



             



             



             

 

 ARTHROPATHY OF LUMBAR  Active          [facility]



 FACET JOINT    017        [place]



             



             



             

 

 Bipolar 1 disorder  Resolved    Problem  10/30/2017     [facility]



             



             

 

 Diabetes  Active    Problem  10/30/2017     [facility]



             



             

 

 Acid reflux  Active    Problem  10/30/2017    [facility]



             



             

 

 Hypercholesteremia  Active    Problem  10/30/2017     [facility]



             



             

 

 Asthma in adult  Active    Problem  10/30/2017    [facility]



             



             

 

 Hypertension  Active    Problem  10/30/2017     [facility]



             



             

 

 Arthritis  Resolved    Problem  10/30/2017     [facility]



             



             

 

 Migraines  Active    Problem  10/30/2017     [facility]



             



             

 

 Depression  Resolved    Problem  10/30/2017     [facility]



             



             

 

 Apnea, sleep  Resolved    Problem  10/30/2017     [facility]



             



             

 

 Bladder incontinence  Active    Problem  10/30/2017     [facility]



             



             







Medications







 Medication  Details  Route  Status  Patient  Ordering  Order  Source



         Instructions  Provider  Date  



               



               



               

 

 acetaminophen-10  1,000 mg, 100    Inactive      10/27/  [facility]



 mg/mL  mL, Route: IV,            [facility]



 INTRAVENOUS  Drug form: INJ,            and



 solution  ONCE, Dosing            Spine



   Weight 112.727,            



   kg, Start date:            



   10/27/17            



   12:17:00 CDT,            



   Stop date:            



   10/27/17            



   12:17:00            



   CDTNotes: Infuse            



   over 15 minutes            



   Do not exceed            



   4gm/day of            



   acetaminophen            



   *** MEDICATION            



   WASTE ***            



   Product Size:            



   1000 mg Product            



   Wasted:  ___ mg            



               



               

 

 Lactated Ringers  1,000 mL, Rate:    No Longer      10/27/  



 1,000 mL  125 ml/hr,    Active        [facility]



   Infuse over: 8            and



   hr, Route: IV,            Spine



   Dosing Weight            



   112.784 kg,            



   Total Volume:            



   1,000, Start            



   date: 10/27/17            



   12:17:00 CDT,            



   Duration: 30            



   day, Stop date:            



   17            



   12:16:00 CST            



               



               

 

 ketOROLAC 15  15 mg, 0.5 mL,    Inactive      10/27/  [facility]



 mg/mL injectable  Route: IV, Drug            [facility]



 solution  form: INJ, ONCE,            and



   Dosing Weight            Spine



   112.727, kg,            



   Priority: NOW,            



   Start date:            



   10/27/17            



   12:17:00 CDT,            



   Stop date:            



   10/27/17            



   12:17:00            



   CDTNotes: (Same            



   as:Toradol) IV            



   bolus must be            



   given >15            



   seconds. Give IM            



   administration            



   slowly and            



   deeply into the            



   muscle.  Not for            



   use > 4 days            



   *** MEDICATION            



   WASTE ***            



   Product Size:            



   30 mg Product            



   Wasted:  ___ mg            



               



               

 

 hydromorphone  1 mg, 0.5 mL,    No Longer      10/27/  [facility]



   Route: IVP, Drug    Active        [facility]



   form: INJ, PRN,            and



   Dosing Weight            Spine



   112.727, kg, PRN            



   Pain Score 7-10,            



   Start date:            



   10/27/17            



   12:17:00 CDT,            



   Duration: 6            



   doses or times,            



   Stop date:            



   10/28/17 0:00:00            



   CDTNotes: (Same            



   as: Dilaudid)            



               



               

 

 promethazine  12.5 mg, 0.5 mL,    No Longer      10/27/  [facility]



   Route: IVPB,    Active        [facility]



   Drug form: INJ,            and



   Q4H, Dosing            Spine



   Weight 112.784,            



   kg, PRN Nausea &            



   Vomiting, Start            



   date: 10/27/17            



   12:17:00 CDT,            



   Duration: 30            



   day, Stop date:            



   17            



   12:16:00            



   CSTNotes: Do not            



   give IV push.            



   (Same as:            



   Phenergan)            



               

 

 ondansetron  4 mg, 2 mL,    No Longer      10/27/  [facility]



   Route: IVP, Drug    Active        [facility]



   form: INJ, ONCE,            and



   Dosing Weight            Spine



   112.727, kg, PRN            



   Nausea &            



   Vomiting, Start            



   date: 10/27/17            



   12:17:00            



   CDTNotes: (Same            



   as: Zofran)            



   *** MEDICATION            



   WASTE ***            



   Product Size:  4            



   mg Product            



   Wasted:  ___ mg            



               



               

 

 acetaminophen-hy  1 tab, Route:    No Longer      10/27/  [facility]



 drocodone 325  PO, Drug Form:    Active      2017  [facility]



 mg-10 mg oral  TAB, Dosing            and



 tablet  Weight 112.727,            Spine



   kg, Q4H, PRN            



   Pain Score 4-6,            



   Start date:            



   10/27/17            



   12:17:00 CDT,            



   Duration: 30            



   day, Stop date:            



   17            



   12:16:00            



   CSTNotes: Do not            



   exceed 4gm/day            



   of            



   acetaminophen.            



   (Same as: Norco            



   325/10)            



               

 

 Saline Flush  10 ml, Route:    No Longer      10/27/  [facility]



 0.9%  IVP, Drug Form:    Active        [facility]



   INJ, Dosing            and



   Weight 112.727,            Spine



   kg, PRN, PRN            



   Line Flush,            



   Start date:            



   10/27/17            



   10:42:00 CDT,            



   Duration: 30            



   day, Stop date:            



   17 9:41:00            



   CSTNotes:            



   preservative            



   free.            



               

 

 Lactated Ringers  1,000 mL, Rate:    Inactive      10/27/  [facility]



 1,000 mL  125 ml/hr,          2017  [facility]



   Infuse over: 8            and



   hr, Route: IV,            Spine



   Dosing Weight            



   112.727 kg,            



   Total Volume:            



   1,000, Start            



   date: 10/27/17            



   10:42:00 CDT,            



   Duration: 30            



   day, Stop date:            



   17            



   10:41:00 CST            



               



               

 

 Hydromorphone  0.5 mg, 0.25 mL,    No Longer      09/15/  [facility]



   Route: IVP, Drug    Active        [facility]



   form: INJ, PRN,            and



   Dosing Weight            Spine



   113.636, kg, PRN            



   Pain Score 4-6,            



   Start date:            



   09/15/17            



   11:27:00 CDT,            



   Duration: 6            



   doses or times,            



   Stop date:            



   17 0:00:00            



   CDTNotes: Same            



   as Dilaudid            



               



               

 

 Ondansetron  4 mg, 2 mL,    No Longer      09/15/  [facility]



   Route: IVP, Drug    Active        [facility]



   form: INJ, ONCE,            and



   Dosing Weight            Spine



   113.636, kg, PRN            



   Nausea &            



   Vomiting, Start            



   date: 09/15/17            



   11:27:00            



   CDTNotes: (Same            



   as: Zofran)            



   *** MEDICATION            



   WASTE ***            



   Product Size:  4            



   mg Product            



   Wasted:  ___ mg            



               



               

 

 Promethazine  12.5 mg, 0.5 mL,    No Longer      09/15/  [facility]



   Route: IVPB,    Active        [facility]



   Drug form: INJ,            and



   Q4H, Dosing            Spine



   Weight 113.636,            



   kg, PRN Nausea &            



   Vomiting, Start            



   date: 09/15/17            



   11:27:00 CDT,            



   Duration: 30            



   day, Stop date:            



   10/15/17            



   11:26:00            



   CDTNotes: Do not            



   give IV push.            



   (Same as:            



   Phenergan)            



               

 

 Lactated Ringers  1,000 mL, Rate:    No Longer      09/15/  [facility]



 1,000 mL  125 ml/hr,    Active        [facility]



   Infuse over: 8            and



   hr, Route: IV,            Spine



   Dosing Weight            



   113.636 kg,            



   Total Volume:            



   1,000, Start            



   date: 09/15/17            



   11:27:00 CDT,            



   Duration: 30            



   day, Stop date:            



   10/15/17            



   11:26:00 CDT            



               



               

 

 Acetaminophen  1 tab, Route:    No Longer      09/15/  [facility]



 325 MG /  PO, Drug Form:    Active        [facility]



 Hydrocodone  TAB, Dosing            and



 Bitartrate 10 MG  Weight 113.636,            Spine



 Oral Tablet  kg, Q4H, PRN            



   Pain Score 4-6,            



   Start date:            



   09/15/17            



   11:27:00 CDT,            



   Duration: 30            



   day, Stop date:            



   10/15/17            



   11:26:00            



   CDTNotes: Do not            



   exceed 4gm/day            



   of            



   acetaminophen.            



   (Same as: Norco            



   325/10)            



               

 

 Lactated Ringers  1,000 mL, Rate:    Inactive      09/15/  [facility]



 1,000 mL  125 ml/hr,          2017  [facility]



   Infuse over: 8            and



   hr, Route: IV,            Spine



   Dosing Weight            



   113.636 kg,            



   Total Volume:            



   1,000, Start            



   date: 09/15/17            



   10:04:00 CDT,            



   Duration: 30            



   day, Stop date:            



   10/15/17            



   10:03:00 CDT            



               



               

 

 Promethazine  12.5 mg, 0.5 mL,    No Longer      



   Route: IVPB,    Active        [facility]



   Drug form: INJ,            and



   Q4H, Dosing            Spine



   Weight 118.182,            



   kg, PRN Nausea &            



   Vomiting, Start            



   date: 17            



   9:46:00 CDT,            



   Duration: 30            



   day, Stop date:            



   17 9:45:00            



   CDTNotes: Do not            



   give IV push.            



   (Same as:            



   Phenergan)            



               

 

 Ondansetron  4 mg, 2 mL,    No Longer      



   Route: IVP, Drug    Active      2017  [facility]



   form: INJ, ONCE,            and



   Dosing Weight            Spine



   118.182, kg, PRN            



   Nausea &            



   Vomiting, Start            



   date: 17            



   9:46:00            



   CDTNotes: (Same            



   as: Zofran)            



   *** MEDICATION            



   WASTE ***            



   Product Size:  4            



   mg Product            



   Wasted:  ___ mg            



               



               

 

 Hydromorphone  1 mg, 0.5 mL,    No Longer      



   Route: IVP, Drug    Active        [facility]



   form: INJ, PRN,            and



   Dosing Weight            Spine



   118.182, kg, PRN            



   Pain Score 7-10,            



   Start date:            



   17 9:46:00            



   CDT, Duration:            



   30 day, Stop            



   date: 17            



   9:45:00            



   CDTNotes: Same            



   as Dilaudid            



               



               

 

 Acetaminophen  1 tab, Route:    No Longer      



 325 MG /  PO, Drug Form:    Active        [facility]



 Hydrocodone  TAB, Dosing            and



 Bitartrate 10 MG  Weight 118.182,            Spine



 Oral Tablet  kg, Q4H, PRN            



   Pain Score 4-6,            



   Start date:            



   17 9:46:00            



   CDT, Duration:            



   30 day, Stop            



   date: 17            



   9:45:00            



   CDTNotes: Do not            



   exceed 4gm/day            



   of            



   acetaminophen.            



   (Same as: Norco            



   325/10)            



               

 

 Lactated Ringers  1,000 mL, Rate:    No Longer      



 1,000 mL  125 ml/hr,    Active        [facility]



   Infuse over: 8            and



   hr, Route: IV,            Spine



   Dosing Weight            



   118.182 kg,            



   Total Volume:            



   1,000, Start            



   date: 17            



   9:46:00 CDT,            



   Duration: 30            



   day, Stop date:            



   17 9:45:00            



   CDT            



               

 

 Lactated Ringers  1,000 mL, Rate:    Inactive      



 1,000 mL  125 ml/hr,          2017  [facility]



   Infuse over: 8            and



   hr, Route: IV,            Spine



   Dosing Weight            



   118.182 kg,            



   Total Volume:            



   1,000, Start            



   date: 17            



   8:53:00 CDT,            



   Duration: 30            



   day, Stop date:            



   17 8:52:00            



   CDT            



               

 

 Lactated Ringers  1,000 mL, Rate:    No Longer      



 1,000 mL  125 ml/hr,    Active        [facility]



   Infuse over: 8            and



   hr, Route: IV,            Spine



   Dosing Weight            



   118.636 kg,            



   Total Volume:            



   1,000, Start            



   date: 17            



   10:52:00 CDT,            



   Duration: 30            



   day, Stop date:            



   17            



   10:51:00 CDT            



               



               

 

 Promethazine  12.5 mg, 0.5 mL,    No Longer      



   Route: IVPB,    Active        [facility]



   Drug form: INJ,            and



   Q4H, Dosing            Spine



   Weight 118.636,            



   kg, PRN Nausea &            



   Vomiting, Start            



   date: 17            



   10:52:00 CDT,            



   Duration: 30            



   day, Stop date:            



   17            



   10:51:00            



   CDTNotes: Do not            



   give IV push.            



   (Same as:            



   Phenergan)            



               

 

 Ondansetron  4 mg, 2 mL,    No Longer      



   Route: IVP, Drug    Active        [facility]



   form: INJ, ONCE,            and



   Dosing Weight            Spine



   118.636, kg, PRN            



   Nausea &            



   Vomiting, Start            



   date: 17            



   10:52:00            



   CDTNotes: (Same            



   as: Zofran)            



   *** MEDICATION            



   WASTE ***            



   Product Size:  4            



   mg Product            



   Wasted:  ___ mg            



               



               

 

 Hydromorphone  1 mg, 0.5 mL,    No Longer      



   Route: IVP, Drug    Active        [facility]



   form: INJ, PRN,            and



   Dosing Weight            Spine



   118.636, kg, PRN            



   Pain Score 7-10,            



   Start date:            



   17            



   10:52:00 CDT,            



   Duration: 3            



   doses or times,            



   Stop date:            



   17 0:00:00            



   CDTNotes: Same            



   as Dilaudid            



               



               

 

 Acetaminophen  1 tab, Route:    No Longer      



 325 MG /  PO, Drug Form:    Active      2017  [facility]



 Hydrocodone  TAB, Dosing            and



 Bitartrate 10 MG  Weight 118.636,            Spine



 Oral Tablet  kg, Q4H, PRN            



   Pain Score 4-6,            



   Start date:            



   17            



   10:52:00 CDT,            



   Duration: 30            



   day, Stop date:            



   17            



   10:51:00            



   CDTNotes: Do not            



   exceed 4gm/day            



   of            



   acetaminophen.            



   (Same as: Norco            



   325/10)            



               

 

 Ondansetron  4 mg, 2 mL,    No Longer      



   Route: IVP, Drug    Active      2017  [facility]



   form: INJ, ONCE,            and



   Dosing Weight            Spine



   118.636, kg, PRN            



   Nausea &            



   Vomiting, Start            



   date: 17            



   10:38:00            



   CDTNotes: (Same            



   as: Zofran)            



   *** MEDICATION            



   WASTE ***            



   Product Size:  4            



   mg Product            



   Wasted:  ___ mg            



               



               

 

 Naloxone  0.4 mg, 1 mL,    Inactive      



   Route: IVP, Drug            [facility]



   form: INJ,            and



   Q2MIN, Dosing            Spine



   Weight 118.636,            



   kg, PRN Narcotic            



   Reversal, Start            



   date: 17            



   10:38:00 CDT,            



   Duration: 8            



   doses or times,            



   Stop date:            



   17            



   18:37:00            



   CDTNotes: Same            



   as Narcan            



               

 

 Flumazenil  0.2 mg, 2 mL,    Inactive      



   Route: IVP, Drug            [facility]



   form: INJ, PRN,            and



   Dosing Weight            Spine



   118.636, kg, PRN            



   Benzodiazepine            



   Reversal,            



   Initial dose,            



   Start date:            



   17            



   10:38:00 CDT,            



   Duration: 8 hr,            



   Stop date:            



   17            



   18:37:00            



   CDTNotes: (Same            



   as: Romazicon)            



               



               

 

 Lactated Ringers  1,000 mL, Rate:    Inactive      



 1,000 mL  125 ml/hr,          2017  [facility]



   Infuse over: 8            and



   hr, Route: IV,            Spine



   Dosing Weight            



   118.636 kg,            



   Total Volume:            



   1,000, Start            



   date: 17            



   9:42:00 CDT,            



   Duration: 30            



   day, Stop date:            



   17 9:41:00            



   CDT            



               

 

 metoprolol 100  100 mg=1 tab,    Active      



 mg oral tablet,  PO, Daily, # 30          2017  [facility]



 extended release  tab, 0 Refill(s)            and



               Spine



               



               



               

 

 lisinopril 10 mg  10 mg=1 tab, PO,    Active        



 oral tablet  Daily, # 30 tab,          2017  [facility]



   0 Refill(s)            and



               Spine



               



               



               

 

 topiramate 50 MG  50 mg=1 tab, PO,    Active        



 Oral Tablet  BID, # 60 tab, 0          2017  [facility]



 [Topamax]  Refill(s)            and



               Spine



               



               



               

 

 Lurasidone  60 mg=1 tab, PO,    Active      07/06/  [facility]



 Hydrochloride 60  Daily, 0          2017  [facility]



 MG Oral Tablet  Refill(s)            and



 [Latuda]              Spine



               



               



               

 

 Deplin 15 mg  15 mg=1 cap, PO,    Active        



 oral capsule  Daily, 0            [facility]



   Refill(s)            and



               Spine



               



               



               

 

 Simvastatin 10  10 mg=1 tab, PO,    Active        



 MG Oral Tablet  Bedtime, # 30            [facility]



 [Zocor]  tab, 0 Refill(s)            and



               Spine



               



               



               

 

 Furosemide 20 MG  20 mg=1 tab, PO,    Active        



 Oral Tablet  Daily, # 30 tab,          2017  [facility]



 [Lasix]  0 Refill(s)            and



               Spine



               



               



               

 

 24 HR  100 mg=1 tab,    Active        



 Desvenlafaxine  PO, Daily, # 30            [facility]



 100 MG Extended  tab, 0 Refill(s)            and



 Release Tablet              Spine



 [Pristiq]              



               



               

 

 Metformin  500 mg=1 tab,    Active        



 hydrochloride  PO, BID-Meals, #            [facility]



 500 MG Oral  30 tab, 0            and



 Tablet  Refill(s)            Spine



               



               



               

 

 amitriptyline 75  75 mg=1 tab, PO,    Active        



 mg oral tablet  Bedtime, # 30            [facility]



   tab, 0 Refill(s)            and



               Spine



               



               



               







Allergies, Adverse Reactions, Alerts







 Substance  Category  Reaction  Severity  Reaction  Status  Date  Comments  
Source



         type    Reported    



                 



                 



                 

 

 penicillins  Assertion      Drug  Active    eyes  [facility]



 <sup>1</sup        allergy      swell,  [facility]



 >              rash  and



               hives  Spine



                 



                 



                 







Immunizations







 Immunization  Date Given  Site  Status  Last Updated  Comments  Source



             



             







Results







 Order  Results  Value  Reference  Date  Interpretation  Comments  Source



 Name      Range        



               



               







Vital Signs







 Vital Sign  Value  Date  Comments  Source



         

 

 Systolic (mm Hg)  102  10/27/2017     [facility]



         [facility]



         

 

 Diastolic (mm Hg)  61  10/27/2017     [facility]



         [facility]



         

 

 Respitory Rate  16  10/27/2017     [facility]



         [facility]



         

 

 BMI Calculated  40.15  10/27/2017     [facility]



         [facility]



         

 

 Weight  112.784  10/27/2017     [facility]



         [facility]



         

 

 Height  167.6 cm  10/27/2017     [facility]



         [facility]



         

 

 Respitory Rate  16  10/27/2017     [facility]



         [facility]



         

 

 Systolic (mm Hg)  100  10/27/2017     [facility]



         [facility]



         

 

 Diastolic (mm Hg)  60  10/27/2017     [facility]



         [facility]



         

 

 Temperature Oral (F)  97.6 F  10/27/2017     [facility]



         [facility]



         

 

 Respitory Rate  16  10/27/2017     [facility]



         [facility]



         

 

 Systolic (mm Hg)  104  10/27/2017    [facility]



         [facility]



         

 

 Diastolic (mm Hg)  59  10/27/2017     [facility]



         [facility]



         

 

 Temperature Oral (F)  98.2 F  10/27/2017    [facility]



         [facility]



         

 

 Systolic (mm Hg)  107  09/15/2017    [facility]



         [facility]



         

 

 Diastolic (mm Hg)  66  09/15/2017    [facility]



         [facility]



         

 

 Respitory Rate  16  09/15/2017    [facility]



         [facility]



         

 

 Respitory Rate  15  09/15/2017    [facility]



         [facility]



         

 

 Systolic (mm Hg)  98  09/15/2017    [facility]



         [facility]



         

 

 Diastolic (mm Hg)  52  09/15/2017    [facility]



         [facility]



         

 

 Respitory Rate  15  09/15/2017    [facility]



         [facility]



         

 

 Systolic (mm Hg)  106  09/15/2017    [facility]



         [facility]



         

 

 Diastolic (mm Hg)  55  09/15/2017     [facility]



         [facility]



         

 

 Temperature Oral (F)  97.8 F  09/15/2017     [facility]



         [facility]



         

 

 BMI Calculated  40.44  2017     [facility]



         [facility]



         

 

 Height  167.64 cm  2017     [facility]



         [facility]



         

 

 Weight  113.636  2017     [facility]



         [facility]



         

 

 Systolic (mm Hg)  107  2017    [facility]



         [facility]



         

 

 Diastolic (mm Hg)  63  2017     [facility]



         [facility]



         

 

 Respitory Rate  14  2017     [facility]



         [facility]



         

 

 Systolic (mm Hg)  101  2017    [facility]



         

 

 Diastolic (mm Hg)  60  2017    [facility]



         

 

 Respitory Rate  14  2017    [facility]



         

 

 Systolic (mm Hg)  104  2017    [facility]



         

 

 Diastolic (mm Hg)  60  2017    [facility]



         

 

 Respitory Rate  14  2017     [facility]



         [facility]



         

 

 BMI Calculated  42.05  2017    [facility]



         [facility]



         

 

 Height  167.64 cm  2017    [facility]



         

 

 Weight  118.182  2017    [facility]



         

 

 BMI Calculated  42.05  2017    [facility]



         

 

 Weight  118.182  2017    [facility]



         

 

 Height  167.64 cm  2017    [facility]



         

 

 Respitory Rate  16  2017    [facility]



         [facility]



         

 

 Systolic (mm Hg)  108  2017    [facility]



         

 

 Diastolic (mm Hg)  55  2017    [facility]



         

 

 Respitory Rate  16  2017     [facility]



         [facility]



         

 

 Systolic (mm Hg)  100  2017    [facility]



         

 

 Diastolic (mm Hg)  57  2017     [facility]



         [facility]



         

 

 Respitory Rate  17  2017     [facility]



         [facility]



         

 

 Systolic (mm Hg)  108  2017     [facility]



         [facility]



         

 

 Diastolic (mm Hg)  51  2017    [facility]



         [facility]



         

 

 Heart Rate  81  2017     [facility]



         [facility]



         

 

 BMI Calculated  42.21  2017    [facility]



         [facility]



         

 

 Weight  118.636  2017     [facility]



         [facility]



         

 

 Height  167.64 cm  2017     [facility]



         [facility]



         







Encounters







 Location  Location  Encounter  Encounter  Reason  Attending  ADM  DC  Status  
Source



   Details  Type  Number  For  Provider  Date  Date    



         Visit          



                   



                   



                   

 

 [facility]    Day  [id number]    [facility]      [facility]      Ortho



 Orthopedic                  and



 and Spine                  Spine



 [facility]  [id number]    [facility]      [facility]      Ortho



 Orthopedic                  and



 and Spine                  Spine



 [facility]  [id number]    [facility]  09/15  09/16    [facility]      Ortho



 Orthopedic                  and



 and Spine                  Spine



 [facility]  [id number]    [facility]  10/27  10/28    [facility]      Ortho



 Orthopedic                  and



 and Spine                  Spine



 [facility]                  



                   



                   







Procedures







 Procedure  Code  Date  Perfomer  Comments  Source



           



           

 

 Injection  [id number]  10/27/2017       [facility]



           Spine



           



           

 

 Lumbar epidural  [id number]  2017    Diagnostic/ther   [facility]



 block<sup>1</sup>        apuetic  Spine



         bilateral L2-L5  



         medial branch  



         blocks  



           

 

 Injection of facet  [id number]  2017       [facility]



 joint          Spine



           



           

 

 Sling procedure of  [id number]  2016 procedure  [facility]



 bladder        on bladder  Spine



 neck<sup>2</sup>          



           

 

 Sling procedure of  [id number]  2016 procedure   [facility]



 bladder        on bladder  Spine



 neck<sup>1</sup>          



           

 

 Hip  [id number]  2015    [id number] left   [facility]



 replacement<sup>3,        hip repair  Spine



 4</sup>          



           

 

 Hip  [id number]  2015    [id number] left   [facility]



 replacement<sup>2,        hip repair  Spine



 3</sup>          



           

 

 Hip  [id number]  2014    right  [facility]



 replacement<sup>5</          Spine



 sup>          



           

 

 Hip  [id number]  2014    right   [facility]



 replacement<sup>4</          Spine



 sup>          



           

 

 Insertion of  [id number]  2008    pain pump    [facility]



 infusion        and   Spine



 pump<sup>6</sup>          



           

 

 Insertion of  [id number]  2008    pain pump    [facility]



 infusion        and   Spine



 pump<sup>5</sup>          



           

 

 Carpal tunnel  [id number]  2006       [facility]



 release          Spine



           



           

 

 Tonsillectomy  [id number]  1966       [facility]



           Spine